# Patient Record
Sex: FEMALE | Race: BLACK OR AFRICAN AMERICAN | NOT HISPANIC OR LATINO | Employment: OTHER | URBAN - NONMETROPOLITAN AREA
[De-identification: names, ages, dates, MRNs, and addresses within clinical notes are randomized per-mention and may not be internally consistent; named-entity substitution may affect disease eponyms.]

---

## 2017-12-05 DIAGNOSIS — I51.7 LEFT ATRIAL ENLARGEMENT: Primary | ICD-10-CM

## 2017-12-05 PROCEDURE — 93000 ELECTROCARDIOGRAM COMPLETE: CPT | Performed by: INTERNAL MEDICINE

## 2017-12-06 ENCOUNTER — APPOINTMENT (OUTPATIENT)
Dept: LAB | Facility: HOSPITAL | Age: 67
End: 2017-12-06

## 2017-12-06 ENCOUNTER — OFFICE VISIT (OUTPATIENT)
Dept: CARDIOLOGY | Facility: CLINIC | Age: 67
End: 2017-12-06

## 2017-12-06 VITALS
SYSTOLIC BLOOD PRESSURE: 144 MMHG | OXYGEN SATURATION: 98 % | DIASTOLIC BLOOD PRESSURE: 90 MMHG | BODY MASS INDEX: 38.87 KG/M2 | HEART RATE: 70 BPM | HEIGHT: 64 IN | WEIGHT: 227.7 LBS

## 2017-12-06 DIAGNOSIS — R60.1 GENERALIZED EDEMA: ICD-10-CM

## 2017-12-06 DIAGNOSIS — R93.1 ABNORMAL ECHOCARDIOGRAM: ICD-10-CM

## 2017-12-06 DIAGNOSIS — I36.1 NON-RHEUMATIC TRICUSPID VALVE INSUFFICIENCY: ICD-10-CM

## 2017-12-06 DIAGNOSIS — R07.2 PRECORDIAL PAIN: Primary | ICD-10-CM

## 2017-12-06 DIAGNOSIS — I35.1 NONRHEUMATIC AORTIC VALVE INSUFFICIENCY: ICD-10-CM

## 2017-12-06 DIAGNOSIS — I34.0 NON-RHEUMATIC MITRAL REGURGITATION: ICD-10-CM

## 2017-12-06 DIAGNOSIS — E78.2 MIXED HYPERLIPIDEMIA: ICD-10-CM

## 2017-12-06 DIAGNOSIS — I10 ESSENTIAL HYPERTENSION: ICD-10-CM

## 2017-12-06 DIAGNOSIS — I73.9 CLAUDICATION (HCC): ICD-10-CM

## 2017-12-06 LAB
ALBUMIN UR-MCNC: <0.6 MG/L
ARTICHOKE IGE QN: 90 MG/DL (ref 1–129)
CHOLEST SERPL-MCNC: 173 MG/DL (ref 0–199)
HDLC SERPL-MCNC: 58 MG/DL (ref 60–200)
LDLC/HDLC SERPL: 1.64 {RATIO} (ref 0–3.22)
NT-PROBNP SERPL-MCNC: 69.7 PG/ML (ref 0–900)
TRIGL SERPL-MCNC: 100 MG/DL (ref 20–199)

## 2017-12-06 PROCEDURE — 99201 PR OFFICE OUTPATIENT NEW 10 MINUTES: CPT | Performed by: INTERNAL MEDICINE

## 2017-12-06 PROCEDURE — 36415 COLL VENOUS BLD VENIPUNCTURE: CPT | Performed by: INTERNAL MEDICINE

## 2017-12-06 PROCEDURE — 80061 LIPID PANEL: CPT | Performed by: INTERNAL MEDICINE

## 2017-12-06 PROCEDURE — 93000 ELECTROCARDIOGRAM COMPLETE: CPT | Performed by: INTERNAL MEDICINE

## 2017-12-06 PROCEDURE — 82043 UR ALBUMIN QUANTITATIVE: CPT | Performed by: INTERNAL MEDICINE

## 2017-12-06 PROCEDURE — 83880 ASSAY OF NATRIURETIC PEPTIDE: CPT | Performed by: INTERNAL MEDICINE

## 2017-12-06 RX ORDER — CARVEDILOL 6.25 MG/1
6.25 TABLET ORAL 2 TIMES DAILY
COMMUNITY
Start: 2017-11-29 | End: 2022-09-29 | Stop reason: HOSPADM

## 2017-12-06 RX ORDER — ATORVASTATIN CALCIUM 20 MG/1
40 TABLET, FILM COATED ORAL DAILY
COMMUNITY
Start: 2017-11-28 | End: 2021-05-28 | Stop reason: SDUPTHER

## 2017-12-06 RX ORDER — AMLODIPINE BESYLATE 10 MG/1
10 TABLET ORAL DAILY
Qty: 90 TABLET | Refills: 3 | Status: SHIPPED | OUTPATIENT
Start: 2017-12-06 | End: 2018-01-09 | Stop reason: SINTOL

## 2017-12-06 RX ORDER — ASPIRIN 81 MG/1
81 TABLET ORAL DAILY
COMMUNITY
End: 2022-11-15

## 2017-12-06 RX ORDER — AMLODIPINE BESYLATE 5 MG/1
TABLET ORAL DAILY
COMMUNITY
Start: 2017-11-16 | End: 2017-12-06

## 2017-12-06 RX ORDER — LOSARTAN POTASSIUM 100 MG/1
TABLET ORAL DAILY
COMMUNITY
Start: 2017-11-10 | End: 2020-03-06 | Stop reason: HOSPADM

## 2017-12-06 RX ORDER — SENNOSIDES 8.6 MG
650 CAPSULE ORAL EVERY 8 HOURS PRN
COMMUNITY

## 2017-12-06 NOTE — PATIENT INSTRUCTIONS
Aortic Insufficiency  Aortic insufficiency is a condition where the aortic valve does not close all the way. The aortic valve is a gate-like structure that is located between the lower left chamber of the heart (left ventricle) and the blood vessel that leads away from the heart (aorta). The aortic valve opens when the left ventricle squeezes to pump blood into the aorta, and it closes when the left ventricle relaxes.   In aortic insufficiency, blood in the aorta leaks through the aortic valve after it has closed. As a result, the heart works harder to pump the same amount of blood through the valve as it would if the valve closed tightly. When left untreated, aortic insufficiency causes enlargement and weakening of the left ventricle. When this happens, the heart fails to pump blood as well as it should, and heart failure, abnormal heart rhythms (arrhythmias), and other dangerous conditions may develop.  CAUSES   Anything that weakens the aortic valve can cause aortic insufficiency. Examples include:   · Severe high blood pressure (hypertension).  · Inflammation of the lining of the heart (endocarditis).  · A ballooning of a weak spot in the aorta wall (aortic aneurysm).  · A tear or separation of the inner walls of the aorta (aortic dissection).  · Trauma that damages the aortic valve.  · Certain drugs.  · Complications during or after a heart surgery (rare).  · Disease of the protein in the body called collagen (collagen vascular disease).  · A birth defect.  · Rheumatic fever.  SIGNS AND SYMPTOMS  Unless a major injury or endocarditis caused aortic insufficiency, symptoms usually develop very slowly over time. Symptoms may include:  · Weakness and tiredness (fatigue).  · Shortness of breath.    · Difficulty breathing while lying flat (orthopnea). You may be sleeping on two or more pillows to breathe better.  · Chest discomfort (angina).    · Head bobbing.  · A fluttering feeling in your chest  (palpitations).  · An irregular or faster-than-normal heartbeat.  DIAGNOSIS   Aortic insufficiency is usually diagnosed with a physical exam and with a type of imaging test called echocardiography. Echocardiography uses sound waves to produce images of the heart. Other tests may also be done to confirm the diagnosis. They may include:  · Chest X-ray.  · MRI.  · Electrocardiography. This is a test that records the electrical impulses of the heart.  · Angiography. This is a test that produces images of arteries in your body. You may need aortic angiography or CT angiography. In aortic angiography, a dye flows to your heart through a soft, flexible tube (catheter) while X-rays are taken. CT angiography uses a CT scanner and MRI in addition to the catheter, dye, and X-rays.  TREATMENT   Treatment depends on how severe the aortic insufficiency is, the problems it is causing, and your symptoms.   · Observation. If the aortic insufficiency is mild, no treatment may be needed. However, you will need to have the condition checked regularly to make sure it is not getting worse or causing serious problems.  · Surgery. If the aortic insufficiency becomes severe, you may need surgery to repair or replace the valve. Surgery is usually recommended if the left ventricle enlarges beyond a certain point. If aortic insufficiency occurs suddenly, surgery may be needed immediately.  · Medicines. Some medicines may help the heart work more efficiently.   HOME CARE INSTRUCTIONS   · Keep all follow-up visits as directed by your health care provider. You may need to have tests done regularly to monitor your condition and how well your heart is pumping blood.  · Notify your health care provider of your aortic insufficiency if you will be having ear, nose, or throat surgery; surgery to your lungs; or dental surgery if:    You have had endocarditis.    You have an artificial heart valve.    You have had surgery to repair a heart valve using a  synthetic or biologic material.   · Take medicines only as directed by your health care provider.    SEEK MEDICAL CARE IF:  · Your chest symptoms seem to be getting worse.  · Your breathing problems seem to be getting worse.  · You feel dizzy or close to fainting.  · You have swelling of the feet, ankles, legs, or abdomen.  · You urinate more than usual during the night (nocturia).  · You have an unexplained fever lasting 2 or more days.  · You have new symptoms that cause concern.  SEEK IMMEDIATE MEDICAL CARE IF:   · You have severe chest pain.  · You have severe shortness of breath.  · You feel rapid or irregular heartbeats.  · You feel light-headed.    · You have unexplained, sudden weight gain.  MAKE SURE YOU:   · Understand these instructions.  · Will watch your condition.  · Will get help right away if you are not doing well or get worse.     This information is not intended to replace advice given to you by your health care provider. Make sure you discuss any questions you have with your health care provider.     Document Released: 06/23/2004 Document Revised: 04/10/2017 Document Reviewed: 02/25/2014  [employer] Interactive Patient Education ©2017 [employer]  Mitral Valve Regurgitation  Mitral valve regurgitation, also called mitral regurgitation, is when blood leaks from the mitral valve. The mitral valve is located between the upper left chamber of the heart (left atrium) and the lower left chamber of the heart (left ventricle). Normally, this valve opens when the atrium pumps blood into the ventricle, and it closes when the ventricle pumps blood out to the body.  Mitral valve regurgitation happens when the mitral valve does not close properly. As a result, blood in the ventricle leaks back into the atrium. Mitral valve regurgitation causes the heart to work harder to pump blood. Over time, this can lead to heart failure.  CAUSES   Causes of mitral valve regurgitation include:  · Damage to the mitral  valve, such as from a birth defect or heart attack.  · Infection.  · Heart disease.  · Mitral valve prolapse.  RISK FACTORS  You are more likely to develop mitral valve regurgitation if you have:  · Mitral valve prolapse.  · A heart valve infection.  · High blood pressure.  · Coronary or rheumatic heart disease.  · Swelling in your left ventricle.  · Marfan syndrome.  · Untreated syphilis.  You are also more likely to develop the condition if you have taken certain diet pills in the past.  SIGNS AND SYMPTOMS  If the condition is mild, you may not have symptoms. If you do have symptoms, they can include:  · Shortness of breath with physical activity, like climbing stairs.  · Fast or irregular heartbeat.  · Cough.  · Excessive urination, especially at night.  · Heavy breathing.  · Extreme tiredness.  · Lightheadedness.  DIAGNOSIS  To diagnose mitral valve regurgitation, your health care provider will listen to your heart for an abnormal heart sound (murmur). Your health care provider may also order tests, such as:  · An echocardiogram.  · A CT scan.  · An MRI.  TREATMENT   Treatment may include:  · Medicines. These may be given to treat symptoms and prevent complications.  · Surgery to repair or replace the mitral valve. This may be done if:    Your heart becomes larger than normal.    Your heart cannot pump blood well.    Your symptoms get worse.  HOME CARE INSTRUCTIONS   · Take medicines only as directed by your health care provider.  · Eat a heart-healthy diet. A dietitian can help you to plan meals.  · Do not use any tobacco products, including cigarettes, chewing tobacco, and electronic cigarettes. If you need help quitting, ask your health care provider.  · Work closely with your health care provider to manage lasting conditions, such as diabetes and high blood pressure.  · Maintain a healthy weight and stay physically active. Ask your health care provider to recommend some activities that are safe for you to  do.  · Limit alcohol intake to no more than 1 drink per day for nonpregnant women and 2 drinks per day for men. One drink equals 12 ounces of beer, 5 ounces of wine, or 1½ ounces of hard liquor.  · Try to get at least 7 hours of sleep each night.  · Find ways to manage stress.  SEEK IMMEDIATE MEDICAL CARE IF:  · You have shortness of breath.  · You develop chest pain.  · You sweat.  · You feel nauseous.  · You have swelling in your hands, feet, ankles, or abdomen that is getting worse.  · You have a feeling of fullness in your abdomen.  · You lose your appetite.  · You feel dizzy or unsteady.  · You have blurred vision or a headache.  · Any of your symptoms begin to get worse.  · You develop muscle aches, chills, or fever.  · You feel ill.     This information is not intended to replace advice given to you by your health care provider. Make sure you discuss any questions you have with your health care provider.     Document Released: 03/07/2006 Document Revised: 04/10/2017 Document Reviewed: 05/20/2015  [employer] Interactive Patient Education ©2017 [employer]

## 2017-12-06 NOTE — PROGRESS NOTES
Cardiovascular Medicine      Maximiliano Saravia M.D., Ph.D., [facility]         Thank you for asking me to see Bacilio Shaffer for LAE.    History of Present Illness      1.  Abnormal echo  2.  Hypertension  3.  Hyperlipidemia  4.  Type 2 diabetes  5. Mild MR/TR/AI    Abnormal Echo/MR/TR/AI  The patient's referred from her primary care provider for an abnormal echocardiogram.  I do not have the full report, but it was interpreted as having a preserved LVEF.  The left atrium was noted to be dilated.  There is a comment on mild degrees of MR, TR and AI.    CPS/Claudication  The patient has had some shoulder discomfort. This worsens at night. She has had no overt dyspnea or angina. No known ASCAD, MI or CHF. She has had mild LE edema and pain in her LE. This does worsen with walking. She had a chemical stress test earlier this year. I do not have these results.     Cardiovascular ROS: no chest pain or dyspnea on exertion.  All other systems were reviewed and were negative.    family history includes Diabetes in her other; Heart disease in her other; Hypertension in her other; Stroke in her other.     reports that she has never smoked. She does not have any smokeless tobacco history on file. She reports that she does not drink alcohol.    Allergies   Allergen Reactions   • Amlodipine    • Lortab [Hydrocodone-Acetaminophen]          Current Outpatient Prescriptions:   •  acetaminophen (TYLENOL 8 HOUR) 650 MG 8 hr tablet, Take 650 mg by mouth Every 8 (Eight) Hours As Needed for Mild Pain ., Disp: , Rfl:   •  amLODIPine (NORVASC) 5 MG tablet, Daily., Disp: , Rfl:   •  aspirin 81 MG EC tablet, Take 81 mg by mouth Daily., Disp: , Rfl:   •  atorvastatin (LIPITOR) 20 MG tablet, Daily., Disp: , Rfl:   •  carvedilol (COREG) 6.25 MG tablet, 2 (Two) Times a Day., Disp: , Rfl:   •  losartan (COZAAR) 100 MG tablet, Daily., Disp: , Rfl:   •  metFORMIN (GLUCOPHAGE) 500 MG tablet, Take 500 mg by mouth 2 (Two) Times a Day With Meals.,  Disp: , Rfl:     Physical Exam:  Vitals:    12/06/17 1030   BP: 144/90   Pulse:    SpO2:      Body mass index is 39.08 kg/(m^2).    GEN: alert, appears stated age and cooperative  Body Habitus: overweight  Neuro: CN II-XII grossly intact.   HEENT: Head: Normocephalic, no lesions, without obvious abnormality.  Neck / Thyroid: Supple, no masses, nodes, nodules or enlargement. No arcus senilis, xanthelasma or xanthomas. PERRL. Normal external ears. No drainage. No thyromegaly. Neck supple. No LAD. Trachea midline. Nose, normal.  JVP: 6 cm of water at 45 degrees HJR: absent      Carotid:  Upstroke: easily palpated bilaterally Volume: Normal.    Carotid Bruit:  None  Subclavian Bruit: Not present.    Lymph: No overt LAD.   Back: Normal.  Chest:  Normal Excursion: Good    I:E: Good  Pulmonary:clear to auscultation, no wheezes, rales or rhonchi, symmetric air entry. Equal chest excursion. Chest physical exam is normal. No tenderness.        Precordium:  No palpable heaves or thrusts. P2 is not palpable.   [place]:  normal size and placement Palpable S4: Not present.   Heart rate: normal  Heart Rhythm: regular     Heart Sounds: S1: normal intensity  S2: normal intensity  S3: absent   S4: absent  Opening Snap: absent  A2-OS:  N/A  Pericardial rub: absent    Ejection click: None      Murmurs: Systolic: none  Diastolic: none  Abdomen: deferred  Extremity: no edema, cyanosis  Pulses: Right radial artery has 2+ (normal) pulse and Left radial artery has 2+ (normal) pulse    DATA REVIEWED:             EKG: normal EKG, normal sinus rhythm.    Assessment/Plan     1. Mild AI/TR/MR. NYHA stage B.  - I discussed with her the typical evaluation interval for this.  - I recommended an annual appointment as well as a repeat echocardiogram in 2020.    2. Claudication/LE edema.  -ABIs, labs, venous Duplex    3. LAE. This is likely from long-standing HTN. BP is not controlled today.  -Increase Norvasc to 10mg  -f/u PCP  -Labs    4. Shoulder  pain. This appears to be MSK. She tells me she had a MPS in [facility]. I do not have these records.   -Obtain stress test copy    5. Cardiac Risk Assessment and need for statin therapy: Moderate Risk.   -Statin:  Yes.  -Recommended ASA    6. Tobacco staus:    Follow-up: One month APRN for results; one year with me.              This document has been electronically signed by Maximiliano Saravia MD PhD on December 6, 2017 10:48 AM

## 2017-12-20 LAB
BH CV LOWER ARTERIAL LEFT ABI RATIO: 1.05
BH CV LOWER ARTERIAL LEFT DORSALIS PEDIS SYS MAX: 181 MMHG
BH CV LOWER ARTERIAL LEFT POST TIBIAL SYS MAX: 168 MMHG
BH CV LOWER ARTERIAL RIGHT ABI RATIO: 1.04
BH CV LOWER ARTERIAL RIGHT DORSALIS PEDIS SYS MAX: 174 MMHG
BH CV LOWER ARTERIAL RIGHT POST TIBIAL SYS MAX: 179 MMHG
BH CV LOWER VASCULAR LEFT COMMON FEMORAL AUGMENT: NORMAL
BH CV LOWER VASCULAR LEFT COMMON FEMORAL COMPETENT: NORMAL
BH CV LOWER VASCULAR LEFT COMMON FEMORAL COMPRESS: NORMAL
BH CV LOWER VASCULAR LEFT COMMON FEMORAL PHASIC: NORMAL
BH CV LOWER VASCULAR LEFT COMMON FEMORAL SPONT: NORMAL
BH CV LOWER VASCULAR LEFT DISTAL FEMORAL AUGMENT: NORMAL
BH CV LOWER VASCULAR LEFT DISTAL FEMORAL COMPETENT: NORMAL
BH CV LOWER VASCULAR LEFT DISTAL FEMORAL COMPRESS: NORMAL
BH CV LOWER VASCULAR LEFT DISTAL FEMORAL PHASIC: NORMAL
BH CV LOWER VASCULAR LEFT DISTAL FEMORAL SPONT: NORMAL
BH CV LOWER VASCULAR LEFT GREATER SAPH AK AUGMENT: NORMAL
BH CV LOWER VASCULAR LEFT GREATER SAPH AK COMPETENT: NORMAL
BH CV LOWER VASCULAR LEFT GREATER SAPH AK COMPRESS: NORMAL
BH CV LOWER VASCULAR LEFT GREATER SAPH AK PHASIC: NORMAL
BH CV LOWER VASCULAR LEFT GREATER SAPH AK SPONT: NORMAL
BH CV LOWER VASCULAR LEFT LESSER SAPH COMPRESS: NORMAL
BH CV LOWER VASCULAR LEFT MID FEMORAL AUGMENT: NORMAL
BH CV LOWER VASCULAR LEFT MID FEMORAL COMPETENT: NORMAL
BH CV LOWER VASCULAR LEFT MID FEMORAL COMPRESS: NORMAL
BH CV LOWER VASCULAR LEFT MID FEMORAL PHASIC: NORMAL
BH CV LOWER VASCULAR LEFT MID FEMORAL SPONT: NORMAL
BH CV LOWER VASCULAR LEFT PERONEAL AUGMENT: NORMAL
BH CV LOWER VASCULAR LEFT PERONEAL COMPETENT: NORMAL
BH CV LOWER VASCULAR LEFT PERONEAL COMPRESS: NORMAL
BH CV LOWER VASCULAR LEFT PERONEAL PHASIC: NORMAL
BH CV LOWER VASCULAR LEFT PERONEAL SPONT: NORMAL
BH CV LOWER VASCULAR LEFT POPLITEAL AUGMENT: NORMAL
BH CV LOWER VASCULAR LEFT POPLITEAL COMPETENT: NORMAL
BH CV LOWER VASCULAR LEFT POPLITEAL COMPRESS: NORMAL
BH CV LOWER VASCULAR LEFT POPLITEAL PHASIC: NORMAL
BH CV LOWER VASCULAR LEFT POPLITEAL SPONT: NORMAL
BH CV LOWER VASCULAR LEFT POSTERIOR TIBIAL AUGMENT: NORMAL
BH CV LOWER VASCULAR LEFT POSTERIOR TIBIAL COMPETENT: NORMAL
BH CV LOWER VASCULAR LEFT POSTERIOR TIBIAL COMPRESS: NORMAL
BH CV LOWER VASCULAR LEFT POSTERIOR TIBIAL PHASIC: NORMAL
BH CV LOWER VASCULAR LEFT POSTERIOR TIBIAL SPONT: NORMAL
BH CV LOWER VASCULAR LEFT PROFUNDA FEMORAL AUGMENT: NORMAL
BH CV LOWER VASCULAR LEFT PROFUNDA FEMORAL COMPETENT: NORMAL
BH CV LOWER VASCULAR LEFT PROFUNDA FEMORAL COMPRESS: NORMAL
BH CV LOWER VASCULAR LEFT PROFUNDA FEMORAL PHASIC: NORMAL
BH CV LOWER VASCULAR LEFT PROFUNDA FEMORAL SPONT: NORMAL
BH CV LOWER VASCULAR LEFT PROXIMAL FEMORAL AUGMENT: NORMAL
BH CV LOWER VASCULAR LEFT PROXIMAL FEMORAL COMPETENT: NORMAL
BH CV LOWER VASCULAR LEFT PROXIMAL FEMORAL COMPRESS: NORMAL
BH CV LOWER VASCULAR LEFT PROXIMAL FEMORAL PHASIC: NORMAL
BH CV LOWER VASCULAR LEFT PROXIMAL FEMORAL SPONT: NORMAL
BH CV LOWER VASCULAR LEFT SAPHENOFEMORAL JUNCTION AUGMENT: NORMAL
BH CV LOWER VASCULAR LEFT SAPHENOFEMORAL JUNCTION COMPETENT: NORMAL
BH CV LOWER VASCULAR LEFT SAPHENOFEMORAL JUNCTION COMPRESS: NORMAL
BH CV LOWER VASCULAR LEFT SAPHENOFEMORAL JUNCTION PHASIC: NORMAL
BH CV LOWER VASCULAR LEFT SAPHENOFEMORAL JUNCTION SPONT: NORMAL
BH CV LOWER VASCULAR RIGHT COMMON FEMORAL AUGMENT: NORMAL
BH CV LOWER VASCULAR RIGHT COMMON FEMORAL COMPETENT: NORMAL
BH CV LOWER VASCULAR RIGHT COMMON FEMORAL COMPRESS: NORMAL
BH CV LOWER VASCULAR RIGHT COMMON FEMORAL PHASIC: NORMAL
BH CV LOWER VASCULAR RIGHT COMMON FEMORAL SPONT: NORMAL
BH CV LOWER VASCULAR RIGHT DISTAL FEMORAL AUGMENT: NORMAL
BH CV LOWER VASCULAR RIGHT DISTAL FEMORAL COMPETENT: NORMAL
BH CV LOWER VASCULAR RIGHT DISTAL FEMORAL COMPRESS: NORMAL
BH CV LOWER VASCULAR RIGHT DISTAL FEMORAL PHASIC: NORMAL
BH CV LOWER VASCULAR RIGHT DISTAL FEMORAL SPONT: NORMAL
BH CV LOWER VASCULAR RIGHT GREATER SAPH AK AUGMENT: NORMAL
BH CV LOWER VASCULAR RIGHT GREATER SAPH AK COMPETENT: NORMAL
BH CV LOWER VASCULAR RIGHT GREATER SAPH AK COMPRESS: NORMAL
BH CV LOWER VASCULAR RIGHT GREATER SAPH AK PHASIC: NORMAL
BH CV LOWER VASCULAR RIGHT GREATER SAPH AK SPONT: NORMAL
BH CV LOWER VASCULAR RIGHT GREATER SAPH BK AUGMENT: NORMAL
BH CV LOWER VASCULAR RIGHT GREATER SAPH BK COMPETENT: NORMAL
BH CV LOWER VASCULAR RIGHT GREATER SAPH BK COMPRESS: NORMAL
BH CV LOWER VASCULAR RIGHT GREATER SAPH BK PHASIC: NORMAL
BH CV LOWER VASCULAR RIGHT GREATER SAPH BK SPONT: NORMAL
BH CV LOWER VASCULAR RIGHT LESSER SAPH COMPRESS: NORMAL
BH CV LOWER VASCULAR RIGHT MID FEMORAL AUGMENT: NORMAL
BH CV LOWER VASCULAR RIGHT MID FEMORAL COMPETENT: NORMAL
BH CV LOWER VASCULAR RIGHT MID FEMORAL COMPRESS: NORMAL
BH CV LOWER VASCULAR RIGHT MID FEMORAL PHASIC: NORMAL
BH CV LOWER VASCULAR RIGHT MID FEMORAL SPONT: NORMAL
BH CV LOWER VASCULAR RIGHT PERONEAL AUGMENT: NORMAL
BH CV LOWER VASCULAR RIGHT PERONEAL COMPETENT: NORMAL
BH CV LOWER VASCULAR RIGHT PERONEAL COMPRESS: NORMAL
BH CV LOWER VASCULAR RIGHT PERONEAL PHASIC: NORMAL
BH CV LOWER VASCULAR RIGHT PERONEAL SPONT: NORMAL
BH CV LOWER VASCULAR RIGHT POPLITEAL AUGMENT: NORMAL
BH CV LOWER VASCULAR RIGHT POPLITEAL COMPETENT: NORMAL
BH CV LOWER VASCULAR RIGHT POPLITEAL COMPRESS: NORMAL
BH CV LOWER VASCULAR RIGHT POPLITEAL PHASIC: NORMAL
BH CV LOWER VASCULAR RIGHT POPLITEAL SPONT: NORMAL
BH CV LOWER VASCULAR RIGHT POSTERIOR TIBIAL AUGMENT: NORMAL
BH CV LOWER VASCULAR RIGHT POSTERIOR TIBIAL COMPETENT: NORMAL
BH CV LOWER VASCULAR RIGHT POSTERIOR TIBIAL COMPRESS: NORMAL
BH CV LOWER VASCULAR RIGHT POSTERIOR TIBIAL PHASIC: NORMAL
BH CV LOWER VASCULAR RIGHT POSTERIOR TIBIAL SPONT: NORMAL
BH CV LOWER VASCULAR RIGHT PROFUNDA FEMORAL COMPRESS: NORMAL
BH CV LOWER VASCULAR RIGHT PROFUNDA FEMORAL PHASIC: NORMAL
BH CV LOWER VASCULAR RIGHT PROFUNDA FEMORAL SPONT: NORMAL
BH CV LOWER VASCULAR RIGHT PROXIMAL FEMORAL AUGMENT: NORMAL
BH CV LOWER VASCULAR RIGHT PROXIMAL FEMORAL COMPETENT: NORMAL
BH CV LOWER VASCULAR RIGHT PROXIMAL FEMORAL COMPRESS: NORMAL
BH CV LOWER VASCULAR RIGHT PROXIMAL FEMORAL PHASIC: NORMAL
BH CV LOWER VASCULAR RIGHT PROXIMAL FEMORAL SPONT: NORMAL
BH CV LOWER VASCULAR RIGHT SAPHENOFEMORAL JUNCTION AUGMENT: NORMAL
BH CV LOWER VASCULAR RIGHT SAPHENOFEMORAL JUNCTION COMPETENT: NORMAL
BH CV LOWER VASCULAR RIGHT SAPHENOFEMORAL JUNCTION COMPRESS: NORMAL
BH CV LOWER VASCULAR RIGHT SAPHENOFEMORAL JUNCTION PHASIC: NORMAL
BH CV LOWER VASCULAR RIGHT SAPHENOFEMORAL JUNCTION SPONT: NORMAL
UPPER ARTERIAL LEFT ARM BRACHIAL SYS MAX: 172 MMHG
UPPER ARTERIAL RIGHT ARM BRACHIAL SYS MAX: 162 MMHG

## 2018-01-09 ENCOUNTER — OFFICE VISIT (OUTPATIENT)
Dept: CARDIOLOGY | Facility: CLINIC | Age: 68
End: 2018-01-09

## 2018-01-09 VITALS
SYSTOLIC BLOOD PRESSURE: 154 MMHG | HEART RATE: 76 BPM | WEIGHT: 226.25 LBS | OXYGEN SATURATION: 97 % | BODY MASS INDEX: 38.63 KG/M2 | HEIGHT: 64 IN | DIASTOLIC BLOOD PRESSURE: 90 MMHG

## 2018-01-09 DIAGNOSIS — R07.2 PRECORDIAL PAIN: Primary | ICD-10-CM

## 2018-01-09 DIAGNOSIS — R60.0 BILATERAL LEG EDEMA: ICD-10-CM

## 2018-01-09 DIAGNOSIS — I38 VALVULAR DISEASE: ICD-10-CM

## 2018-01-09 DIAGNOSIS — I10 ESSENTIAL HYPERTENSION: ICD-10-CM

## 2018-01-09 DIAGNOSIS — E78.2 MIXED HYPERLIPIDEMIA: ICD-10-CM

## 2018-01-09 DIAGNOSIS — I73.9 CLAUDICATION (HCC): ICD-10-CM

## 2018-01-09 PROCEDURE — 99214 OFFICE O/P EST MOD 30 MIN: CPT | Performed by: NURSE PRACTITIONER

## 2018-01-09 RX ORDER — GABAPENTIN 600 MG/1
300 TABLET ORAL DAILY PRN
COMMUNITY
End: 2021-12-13

## 2018-01-09 NOTE — PROGRESS NOTES
Subjective:     precordial pain (chief complaint )      History of Present Illness  Abnormal Echo/MR/TR/AI  The patient's referred from her primary care provider for an abnormal echocardiogram. Do not have the full report, but it was interpreted as having a preserved LVEF. The left atrium was noted to be dilated.  There is a comment on mild degrees of MR, TR and AI.    CPS/Claudication  The patient has had some shoulder discomfort. This worsens at night. She has had no overt dyspnea or angina. No known ASCAD, MI or CHF. She has had mild LE edema and pain in her LE. This does worsen with walking. She had a chemical stress test earlier this year. Fax sent to [facility] to retrieve these medical records.     Ms. Shaffer is a 1 month follow up for results of ABIs and venous duplex. Her leg swelling has worsened over the past month since Norvasc was increased from 5mg to 10mg. 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 She does not have many breaks. Her legs are very swollen at the end of the day. In the AM when she awakens, the swelling is gone.   She does have elevated BP followed by her PCP. Recommendations given today to patient, but will defer any further changes to PCP.     Review of Systems   Constitution: Negative for chills, decreased appetite, fever and weakness.   HENT: Negative.    Eyes: Negative.    Cardiovascular: Positive for leg swelling. Negative for chest pain, claudication, dyspnea on exertion, irregular heartbeat and palpitations.   Respiratory: Negative for cough, shortness of breath and wheezing.    Endocrine: Negative.    Skin: Negative for dry skin, flushing and rash.   Musculoskeletal: Negative for falls and myalgias.   Gastrointestinal: Negative for abdominal pain, change in bowel habit and melena.   Genitourinary: Negative for frequency and hematuria.   Neurological: Negative for dizziness, light-headedness and loss of balance.   Psychiatric/Behavioral: Negative for  "altered mental status and memory loss. The patient is not nervous/anxious.        Past Medical History:   Diagnosis Date   • Carpal tunnel syndrome    • Diabetes mellitus    • Diabetic neuropathy    • Female stress incontinence    • History of colonoscopy 2007   • History of mammogram 05/2011   • Hyperlipidemia    • Hypertension    • Murmur, cardiac    • Sciatica of left side    • Vitamin D deficiency      Past Surgical History:   Procedure Laterality Date   • CARPAL TUNNEL RELEASE  08/22/2007   • HYSTERECTOMY     • INJECTION OF MEDICATION  12/08/2015    kenalog 6   • INJECTION OF MEDICATION  02/12/2015    toradol 3       Current Outpatient Prescriptions   Medication Sig Dispense Refill   • acetaminophen (TYLENOL 8 HOUR) 650 MG 8 hr tablet Take 650 mg by mouth Every 8 (Eight) Hours As Needed for Mild Pain .     • amLODIPine (NORVASC) 10 MG tablet Take 1 tablet by mouth Daily. 90 tablet 3   • aspirin 81 MG EC tablet Take 81 mg by mouth Daily.     • atorvastatin (LIPITOR) 20 MG tablet Daily.     • carvedilol (COREG) 6.25 MG tablet 2 (Two) Times a Day.     • gabapentin (NEURONTIN) 600 MG tablet Take 600 mg by mouth Daily.     • losartan (COZAAR) 100 MG tablet Daily.     • metFORMIN (GLUCOPHAGE) 500 MG tablet Take 500 mg by mouth 2 (Two) Times a Day With Meals.       No current facility-administered medications for this visit.         Objective:     Vitals:    01/09/18 0934   BP: 154/90   BP Location: Left arm   Patient Position: Sitting   Cuff Size: Adult   Pulse: 76   SpO2: 97%   Weight: 103 kg (226 lb 4 oz)   Height: 162.6 cm (64\")        Physical Exam   Constitutional: She is oriented to person, place, and time. She appears well-developed and well-nourished. No distress.   HENT:   Head: Normocephalic.   Neck: No JVD present.   Cardiovascular: Normal rate, regular rhythm, S1 normal, S2 normal, normal heart sounds and intact distal pulses.    No murmur heard.  Pulmonary/Chest: Effort normal and breath sounds normal. " No respiratory distress. She has no wheezes. She has no rales.   Abdominal: Soft. Bowel sounds are normal.   Musculoskeletal: Normal range of motion. She exhibits no edema.   Neurological: She is alert and oriented to person, place, and time.   Skin: Skin is warm and dry. No erythema.   Psychiatric: She has a normal mood and affect. Her behavior is normal. Judgment and thought content normal.       Cardiographics  Echocardiogram: 2017      ECG:  NSR    Stress Testin/3/2017      Venous Duplex 2017  Interpretation Summary      · Normal bilateral lower extremity venous duplex scan.     WANDY's 2017  Interpretation Summary      · Right Conclusion: The right WANDY is normal.  · Left Conclusion: The left WANDY is normal.       Imaging  Chest x-ray:    Lab Review    Ref. Range 2017 11:15   Total Cholesterol Latest Ref Range: 0 - 199 mg/dL 173   HDL Cholesterol Latest Ref Range: 60 - 200 mg/dL 58 (L)   LDL Cholesterol  Latest Ref Range: 1 - 129 mg/dL 90   Triglycerides Latest Ref Range: 20 - 199 mg/dL 100   LDL/HDL Ratio Latest Ref Range: 0.00 - 3.22  1.64   Microalbumin, Urine Latest Units: mg/L <0.6     The following portions of the patient's history were reviewed and updated as appropriate: allergies, current medications, past family history, past medical history, past social history, past surgical history and problem list.     Assessment/Plan:      Diagnosis Plan   1. Precordial pain  No CP complaint.   Normal Stress test reviewed.       2. Mixed hyperlipidemia  Lipitor 20mg.  Followed by PCP       3. Essential hypertension  HTN, essential.   BP noted to be mildly elevated today in office, S1, S2 normal, no gallop, no murmur, chest clear, no JVD, no HSM, no edema.    LVH: absent.  LVEF:Normal.   Diastolic function:Not Available.  End-organ damage: diabetes    Medications:  Stop Amlodipine secondary to worsening edema with increase from 5mg to 10mg daily last month.  Increase Coreg 6.25mg to 12.5mg  BID with instructions to monitor HR. If HR <60 will need to return to original dose.   PCP appointment today: any changes that need to be made can be made by PCP if they do not agree with above.   Possibility of adding hydralazine 25mg BID.       4. Claudication  Negative duplex and normal ABIs       5. Valvular disease  Mild MR and Mild TR  Yearly eval with Cardiology.  Echo surveillience in 2023 or earlier if symptoms present.        6. Bilateral leg edema  Edema absent this AM. Worse in the evenings, not present in AM. Has worsened over the past month since increasing Norvasc to 10mg.   No s/s of CHF present.    Stop Amlodopine 10mg.   Compression stockings RX given.         Follow up with PCP today as scheduled for evaluation regarding HTN. Next scheduled with Dr. Saravia.  Please call for need to be seen sooner with myself or Norma for any complaints.

## 2018-01-09 NOTE — PATIENT INSTRUCTIONS
Increase Coreg to 12.5mg BID and STOP Amlodipine due to leg swelling.    Watch HR with Coreg increase. Do not need it to go below 60 bpm.     Compression stockings recommended. Rx provided.     Signs and symptoms of Atrial fibrillation explained.     Recommend Sleep Study evaluation for obstructive sleep apnea.

## 2019-01-03 DIAGNOSIS — R07.9 CHEST PAIN, UNSPECIFIED TYPE: Primary | ICD-10-CM

## 2019-01-04 ENCOUNTER — OFFICE VISIT (OUTPATIENT)
Dept: CARDIOLOGY | Facility: CLINIC | Age: 69
End: 2019-01-04

## 2019-01-04 VITALS
BODY MASS INDEX: 40.04 KG/M2 | HEIGHT: 64 IN | SYSTOLIC BLOOD PRESSURE: 148 MMHG | HEART RATE: 71 BPM | WEIGHT: 234.5 LBS | OXYGEN SATURATION: 98 % | DIASTOLIC BLOOD PRESSURE: 90 MMHG

## 2019-01-04 DIAGNOSIS — E66.09 CLASS 2 OBESITY DUE TO EXCESS CALORIES WITHOUT SERIOUS COMORBIDITY WITH BODY MASS INDEX (BMI) OF 39.0 TO 39.9 IN ADULT: ICD-10-CM

## 2019-01-04 DIAGNOSIS — I34.0 NON-RHEUMATIC MITRAL REGURGITATION: Primary | ICD-10-CM

## 2019-01-04 DIAGNOSIS — I36.1 NON-RHEUMATIC TRICUSPID VALVE INSUFFICIENCY: ICD-10-CM

## 2019-01-04 DIAGNOSIS — E78.2 MIXED HYPERLIPIDEMIA: ICD-10-CM

## 2019-01-04 DIAGNOSIS — I35.1 NONRHEUMATIC AORTIC VALVE INSUFFICIENCY: ICD-10-CM

## 2019-01-04 PROBLEM — I73.9 CLAUDICATION: Status: RESOLVED | Noted: 2018-01-09 | Resolved: 2019-01-04

## 2019-01-04 PROBLEM — R07.2 PRECORDIAL PAIN: Status: RESOLVED | Noted: 2018-01-09 | Resolved: 2019-01-04

## 2019-01-04 PROBLEM — I38 VALVULAR DISEASE: Status: RESOLVED | Noted: 2018-01-09 | Resolved: 2019-01-04

## 2019-01-04 PROCEDURE — 99214 OFFICE O/P EST MOD 30 MIN: CPT | Performed by: INTERNAL MEDICINE

## 2019-01-04 PROCEDURE — 93000 ELECTROCARDIOGRAM COMPLETE: CPT | Performed by: INTERNAL MEDICINE

## 2019-01-04 RX ORDER — PRENATAL VIT NO.126/IRON/FOLIC 28MG-0.8MG
TABLET ORAL DAILY
COMMUNITY
End: 2021-06-11

## 2019-01-04 RX ORDER — AMLODIPINE BESYLATE 10 MG/1
10 TABLET ORAL AS NEEDED
COMMUNITY
End: 2020-03-06 | Stop reason: HOSPADM

## 2019-01-04 NOTE — PROGRESS NOTES
Cardiovascular Medicine      Maximiliano Saravia M.D., Ph.D., [facility]           History of Present Illness      1. MR  2. TR  3. AI  4.  Dyslipidemia  5. HTN  6. DM2    Bacilio Shaffer is a 68 y.o. female returns to clinic for valvular disease.  She has a history of abnormal echo.  This was in 2017.  She had mild degrees of mitral regurgitation and tricuspid regurgitation.  The patient's PA systolic pressure was normal.  She also reportedly had mild aortic regurgitation.  She remains asymptomatic from a cardiovascular standpoint.  She denies any resting, exertional or nocturnal angina.  No dyspnea.  No palpitations.    Review of Systems   Cardiovascular: Negative.    Respiratory: Negative.        family history includes Diabetes in her other; Heart disease in her other; Hypertension in her other; Stroke in her other.     reports that  has never smoked. she has never used smokeless tobacco. Drug use questions deferred to the physician. She reports that she does not drink alcohol.    Allergies   Allergen Reactions   • Atenolol    • Lortab [Hydrocodone-Acetaminophen]          Current Outpatient Medications:   •  acetaminophen (TYLENOL 8 HOUR) 650 MG 8 hr tablet, Take 650 mg by mouth Every 8 (Eight) Hours As Needed for Mild Pain ., Disp: , Rfl:   •  aspirin 81 MG EC tablet, Take 81 mg by mouth Daily., Disp: , Rfl:   •  atorvastatin (LIPITOR) 20 MG tablet, Daily., Disp: , Rfl:   •  carvedilol (COREG) 6.25 MG tablet, 2 (Two) Times a Day., Disp: , Rfl:   •  gabapentin (NEURONTIN) 600 MG tablet, Take 600 mg by mouth Daily., Disp: , Rfl:   •  losartan (COZAAR) 100 MG tablet, Daily., Disp: , Rfl:   •  metFORMIN (GLUCOPHAGE) 500 MG tablet, Take 500 mg by mouth 2 (Two) Times a Day With Meals., Disp: , Rfl:     Physical Exam:  Vitals:    01/04/19 0803   BP: 148/90   Pulse: 71   SpO2: 98%     Body mass index is 40.25 kg/m².    GEN: alert, appears stated age and cooperative  Body Habitus: overweight  HEENT: Head: Normocephalic,  no lesions, without obvious abnormality.  JVP: 6 cm of water at 45 degrees HJR: absent      Heart rate: normal  Heart Rhythm: regular     Heart Sounds: S1: normal intensity  S2: normal intensity  S3: absent   S4: absent  Opening Snap: absent  A2-OS:  N/A  Pericardial rub: absent    Ejection click: None      Murmurs: Systolic: I/VI GREGORY at RSB  Diastolic: none  Extremity: Moves spontaneously    DATA REVIEWED:       Assessment/Plan      Diagnosis Plan   1. Non-rheumatic mitral regurgitation.  ACC stage B.   · Annual clinical surveillance   · Plans for repeat 2-D TTE prior to next appointment in 2020    2. Non-rheumatic tricuspid valve insufficiency/aortic insufficiency.   · As above    3. Mixed hyperlipidemia    · Dietary changes: Increase soluble fiber  · Reduce saturated fat and cholesterol  · Exercise changes: Advised to engage in aerobic exercise on most days of the week  · Statin: Yes  · ASA: Yes  · Continue follow-up visits with PCP for monitoring of labs   4. Obese General patient education:  -Weight loss hand-out  -Exercise intervention:   Hand out, increase aerobic activity  -PCP Follow-up     Return in about 1 year (around 1/4/2020).

## 2019-01-04 NOTE — PATIENT INSTRUCTIONS
Echocardiogram  An echocardiogram, or echocardiography, uses sound waves (ultrasound) to produce an image of your heart. The echocardiogram is simple, painless, obtained within a short period of time, and offers valuable information to your health care provider. The images from an echocardiogram can provide information such as:  · Evidence of coronary artery disease (CAD).  · Heart size.  · Heart muscle function.  · Heart valve function.  · Aneurysm detection.  · Evidence of a past heart attack.  · Fluid buildup around the heart.  · Heart muscle thickening.  · Assess heart valve function.    Tell a health care provider about:  · Any allergies you have.  · All medicines you are taking, including vitamins, herbs, eye drops, creams, and over-the-counter medicines.  · Any problems you or family members have had with anesthetic medicines.  · Any blood disorders you have.  · Any surgeries you have had.  · Any medical conditions you have.  · Whether you are pregnant or may be pregnant.  What happens before the procedure?  No special preparation is needed. Eat and drink normally.  What happens during the procedure?  · In order to produce an image of your heart, gel will be applied to your chest and a wand-like tool (transducer) will be moved over your chest. The gel will help transmit the sound waves from the transducer. The sound waves will harmlessly bounce off your heart to allow the heart images to be captured in real-time motion. These images will then be recorded.  · You may need an IV to receive a medicine that improves the quality of the pictures.  What happens after the procedure?  You may return to your normal schedule including diet, activities, and medicines, unless your health care provider tells you otherwise.  This information is not intended to replace advice given to you by your health care provider. Make sure you discuss any questions you have with your health care provider.  Document Released: 12/15/2001  Document Revised: 08/05/2017 Document Reviewed: 08/25/2014  [employer] Patient Education © 2017 [employer]  Mitral Valve Regurgitation  Mitral valve regurgitation, also called mitral regurgitation, is a condition in which blood leaks from the mitral valve in the heart. The mitral valve is located between the upper left chamber (left atrium) and the lower left chamber (left ventricle) of the heart. Normally, this valve opens when the atrium pumps blood into the ventricle, and it closes when the ventricle pumps blood out to the body.  Mitral valve regurgitation happens when the mitral valve does not close properly. As a result, blood in the ventricle leaks back into the atrium. Mitral valve regurgitation causes the heart to work harder to pump blood. If the condition is mild, a person may not have symptoms. However, over time, this can lead to heart failure.  What are the causes?  This condition may be caused by:  · A condition in which the mitral valves do not close completely when the heart pumps blood (mitral valve prolapse).  · Infection, such as endocarditis or rheumatic fever.  · Damage to the mitral valve, such as from injury (trauma) to the heart, a problem present at birth (birth defect), or a heart attack.  · Certain medicines.    What increases the risk?  This condition is more likely to develop in people who have:  · Certain forms of heart disease.  · A family history of heart valve disease.  · Certain conditions that are present at birth (congenital).    You are also more likely to develop this condition if you have taken certain diet pills in the past.  What are the signs or symptoms?  Symptoms of this condition include:  · Shortness of breath with physical activity, like climbing stairs.  · Fast or irregular heartbeat.  · Cough.  · Suddenly waking up at night with difficulty breathing or needing to urinate.  · Heavy breathing.  · Extreme tiredness.  · Swelling in the lower legs, ankles, and  feet.    In some cases of mild to moderate mitral regurgitation, there are no symptoms.  How is this diagnosed?  This condition may be diagnosed based on the results of a physical exam. Your health care provider will listen to your heart for an abnormal heart sound (murmur). You may also have other tests, including:  · An echocardiogram. This test creates ultrasound images of the heart that allow your health care provider to see how the heart valves work while your heart is beating.  · Chest X-ray.  · Electrocardiogram (ECG). This is a test that records the electrical impulses of the heart.  · Cardiac catheterization. This test is used to look at the structure and function of the heart. A thin tube (catheter) is passed through the blood vessels and into the heart. Dye is injected into the blood vessels so the cardiac system can be seen on images that are taken.    How is this treated?  This condition may be treated with:  · Medicines. These may be given to treat symptoms and prevent complications.  · Surgery to repair or replace the mitral valve in severe, long-term (chronic) cases.    Follow these instructions at home:  Lifestyle  · Limit alcohol intake to no more than 1 drink a day for nonpregnant women and 2 drinks a day for men. One drink equals 12 oz of beer, 5 oz of wine, or 1½ oz of hard liquor.  · Do not use any products that contain nicotine or tobacco, such as cigarettes and e-cigarettes. If you need help quitting, ask your health care provider.  · Eat a heart-healthy diet that includes plenty of fresh fruits and vegetables, whole grains, low-fat (lean) protein, and low-fat dairy products. Consider working with a diet and nutrition specialist (dietitian) to help you make healthy food choices.  · Limit the amount of salt (sodium) in your diet. Avoid adding salt to foods, and avoid foods that are high in salt, such as:  ? Pickles.  ? Smoked and cured meats.  ? Processed foods.  · Maintain a healthy weight  and stay physically active. Ask your health care provider to recommend activities that are safe for you.  · Try to get 7 or more hours of sleep each night.  · Find ways to manage stress. If you need help with this, ask your health care provider.  General instructions    · Take over-the-counter and prescription medicines only as told by your health care provider.  · Work closely with your health care provider to manage any other health conditions you have, such as diabetes or high blood pressure.  · If you plan to become pregnant, talk with your health care provider first.  · Keep all follow-up visits as told by your health care provider. This is important.  Contact a health care provider if:  · You have a fever.  · You feel more tired than usual when doing physical activity.  · You have a dry cough.  Get help right away if:  · You have shortness of breath.  · You develop chest pain.  · You have swelling in your hands, feet, ankles, or abdomen that is getting worse.  · You have trouble staying awake or you faint.  · You feel dizzy or unsteady.  · You suddenly gain weight.  · You feel confused.  · Any of your symptoms begin to get worse.  These symptoms may represent a serious problem that is an emergency. Do not wait to see if the symptoms will go away. Get medical help right away. Call your local emergency services (911 in the [place] Do not drive yourself to the hospital.  Summary  · Mitral valve regurgitation, also called mitral regurgitation, is a condition in which blood leaks from a valve between two chambers of the heart (mitral valve).  · Depending on how severe your condition is, you may be treated with medicines or surgery.  · Practice heart-healthy habits to manage this condition. These include limiting alcohol, avoiding nicotine and tobacco, and eating a balanced diet that is low in salt (sodium).  This information is not intended to replace advice given to you by your health care provider. Make sure you  discuss any questions you have with your health care provider.  Document Released: 03/07/2006 Document Revised: 09/29/2017 Document Reviewed: 09/29/2017  [employer] Interactive Patient Education © 2018 [employer]  Aortic Insufficiency  Aortic insufficiency is a condition in which the aortic valve does not close all the way. The aortic valve is a gate-like structure between the lower left chamber of the heart (left ventricle) and the main blood vessel that supplies blood to the rest of the body (aorta). The aortic valve opens when the left ventricle squeezes to pump blood into the aorta, and it closes when the left ventricle relaxes.  In aortic insufficiency, blood in the aorta leaks through the aortic valve after it has closed. This causes the heart to work harder than usual. If aortic insufficiency is not treated, it causes enlargement and weakening of the left ventricle. This can result in heart failure, abnormal heart rhythms (arrhythmias), and other dangerous conditions. If this condition develops suddenly, it may need to be treated with emergency surgery.  What are the causes?  This condition may be caused by anything that weakens the aortic valve, such as:  · Severe high blood pressure (hypertension).  · Inflammation of the inner layer of the heart or the heart valves (endocarditis).  · A ballooning of a weak spot in the aorta wall (aortic aneurysm).  · A tear or separation of the inner walls of the aorta (aortic dissection).  · Injury (trauma) that damages the aortic valve.  · Certain medicines.  · Disease of a protein in the body called collagen (collagen vascular disease).  · A problem that is present at birth (birth defect).  · An inflammatory condition that can develop after an untreated strep throat infection (rheumatic fever).  · Complications during or after a heart surgery (rare).    What are the signs or symptoms?  Symptoms of this condition include:  · Fatigue.  · Shortness of breath.  · Difficulty  breathing while lying flat (orthopnea). You may need to sleep on two or more pillows to breathe better.  · Chest discomfort (angina).  · Head bobbing.  · A fluttering feeling in your chest (palpitations).  · An irregular or faster-than-normal heartbeat.    Symptoms usually develop gradually, unless this condition was caused by a major injury or by endocarditis.  How is this diagnosed?  This condition is diagnosed based on the results of:  · A physical exam.  · An imaging test that uses sound waves to produce images of the heart (echocardiogram).    You may also have other tests to confirm the diagnosis, including:  · Chest X-ray.  · MRI.  · A test that records the electrical impulses of the heart (electrocardiogram, ECG).  · CT angiogram (CTA). In this procedure, a large X-ray machine, called a CT scanner, takes detailed pictures of blood vessels after dye has been injected into the vessels.  · Aortic angiogram. In this procedure, X-ray images are taken after dye has been injected into blood vessels. This tests the function of the aorta.    How is this treated?  Treatment depends on your symptoms, how severe the condition is, and what problems the condition is causing. Treatment may include:  · Observation. If your condition is mild, you may not need treatment. However, you will need to have your condition checked regularly to make sure it is not getting worse or causing serious problems.  · Medicines that help the heart work more efficiently.  · Surgery to repair or replace the valve, in severe cases. Surgery is usually recommended if the left ventricle enlarges beyond a certain point. If aortic insufficiency occurs suddenly, surgery may be needed immediately.    Follow these instructions at home:  · Take over-the-counter and prescription medicines only as told by your health care provider.  · Do not use any products that contain nicotine or tobacco, such as cigarettes and e-cigarettes. If you need help quitting,  ask your health care provider.  · If directed by your health care provider, avoid heavy weight lifting and contact sports such as football.  · Keep all follow-up visits as told by your health care provider. This is important. You may need regular tests to monitor your condition and how well your heart is pumping blood.  · Follow instructions from your health care provider about eating or drinking restrictions. Your health care provider may recommend that you:  ? Limit alcohol intake to no more than 1 drink a day for nonpregnant women and 2 drinks a day for men. One drink equals 12 oz of beer, 5 oz of wine, or 1½ oz of hard liquor.  ? Eat foods that are high in fiber, such as fresh fruits and vegetables, whole grains, and beans.  ? Eat less salt (sodium) and salty foods. Check ingredients and nutrition facts on packaged foods and beverages.  Contact a health care provider if:  · Your chest symptoms seem to be getting worse.  · Your breathing problems seem to be getting worse.  · You feel dizzy or close to fainting.  · You have swelling in your feet, ankles, legs, or abdomen.  · You urinate more than usual during the night (nocturia).  · You have an unexplained fever that lasts 2 days or longer.  · You develop new symptoms.  Get help right away if:  · You have severe chest pain.  · You have severe shortness of breath.  · You feel rapid or irregular heartbeats.  · You feel light-headed.  · You have sudden, unexplained weight gain.  Summary  · Aortic insufficiency is a condition in which the aortic valve does not close all the way. This causes the heart to work harder than usual.  · This condition may be treated with observation, medicines, or surgery.  · Take over-the-counter and prescription medicines only as told by your health care provider.  · Eat less salt (sodium) and salty foods. Check ingredients and nutrition facts on packaged foods and beverages.  This information is not intended to replace advice given to  you by your health care provider. Make sure you discuss any questions you have with your health care provider.  Document Released: 06/23/2004 Document Revised: 04/09/2018 Document Reviewed: 11/06/2017  [employer] Interactive Patient Education © 2018 [employer]

## 2020-01-02 LAB
BH CV ECHO MEAS - ACS: 1.7 CM
BH CV ECHO MEAS - AO ISTHMUS: 2.5 CM
BH CV ECHO MEAS - AO MAX PG (FULL): 5.4 MMHG
BH CV ECHO MEAS - AO MAX PG: 10.5 MMHG
BH CV ECHO MEAS - AO MEAN PG (FULL): 4 MMHG
BH CV ECHO MEAS - AO MEAN PG: 6 MMHG
BH CV ECHO MEAS - AO ROOT AREA (BSA CORRECTED): 1.1
BH CV ECHO MEAS - AO ROOT AREA: 4.5 CM^2
BH CV ECHO MEAS - AO ROOT DIAM: 2.4 CM
BH CV ECHO MEAS - AO V2 MAX: 162 CM/SEC
BH CV ECHO MEAS - AO V2 MEAN: 107 CM/SEC
BH CV ECHO MEAS - AO V2 VTI: 30.2 CM
BH CV ECHO MEAS - ASC AORTA: 3 CM
BH CV ECHO MEAS - AVA(I,A): 1.9 CM^2
BH CV ECHO MEAS - AVA(I,D): 1.9 CM^2
BH CV ECHO MEAS - AVA(V,A): 1.8 CM^2
BH CV ECHO MEAS - AVA(V,D): 1.8 CM^2
BH CV ECHO MEAS - BSA(HAYCOCK): 2.2 M^2
BH CV ECHO MEAS - BSA: 2.1 M^2
BH CV ECHO MEAS - BZI_BMI: 40.2 KILOGRAMS/M^2
BH CV ECHO MEAS - BZI_METRIC_HEIGHT: 162.6 CM
BH CV ECHO MEAS - BZI_METRIC_WEIGHT: 106.1 KG
BH CV ECHO MEAS - EDV(CUBED): 68.9 ML
BH CV ECHO MEAS - EDV(MOD-SP2): 101 ML
BH CV ECHO MEAS - EDV(MOD-SP4): 93 ML
BH CV ECHO MEAS - EDV(TEICH): 74.2 ML
BH CV ECHO MEAS - EF(CUBED): 64.6 %
BH CV ECHO MEAS - EF(MOD-SP2): 62.4 %
BH CV ECHO MEAS - EF(MOD-SP4): 71 %
BH CV ECHO MEAS - EF(TEICH): 56.6 %
BH CV ECHO MEAS - ESV(CUBED): 24.4 ML
BH CV ECHO MEAS - ESV(MOD-SP2): 38 ML
BH CV ECHO MEAS - ESV(MOD-SP4): 27 ML
BH CV ECHO MEAS - ESV(TEICH): 32.2 ML
BH CV ECHO MEAS - FS: 29.3 %
BH CV ECHO MEAS - IVS/LVPW: 1.1
BH CV ECHO MEAS - IVSD: 1.7 CM
BH CV ECHO MEAS - LA DIMENSION: 4 CM
BH CV ECHO MEAS - LA/AO: 1.7
BH CV ECHO MEAS - LV DIASTOLIC VOL/BSA (35-75): 44.5 ML/M^2
BH CV ECHO MEAS - LV MASS(C)D: 280.4 GRAMS
BH CV ECHO MEAS - LV MASS(C)DI: 134.1 GRAMS/M^2
BH CV ECHO MEAS - LV MAX PG: 5.1 MMHG
BH CV ECHO MEAS - LV MEAN PG: 2 MMHG
BH CV ECHO MEAS - LV SYSTOLIC VOL/BSA (12-30): 12.9 ML/M^2
BH CV ECHO MEAS - LV V1 MAX: 113 CM/SEC
BH CV ECHO MEAS - LV V1 MEAN: 62.5 CM/SEC
BH CV ECHO MEAS - LV V1 VTI: 22 CM
BH CV ECHO MEAS - LVIDD: 4.1 CM
BH CV ECHO MEAS - LVIDS: 2.9 CM
BH CV ECHO MEAS - LVLD AP2: 8.5 CM
BH CV ECHO MEAS - LVLD AP4: 8 CM
BH CV ECHO MEAS - LVLS AP2: 7.1 CM
BH CV ECHO MEAS - LVLS AP4: 6.7 CM
BH CV ECHO MEAS - LVOT AREA (M): 2.5 CM^2
BH CV ECHO MEAS - LVOT AREA: 2.5 CM^2
BH CV ECHO MEAS - LVOT DIAM: 1.8 CM
BH CV ECHO MEAS - LVPWD: 1.6 CM
BH CV ECHO MEAS - MR ALIAS VEL: 30.8 CM/SEC
BH CV ECHO MEAS - MR ERO: 0.21 CM^2
BH CV ECHO MEAS - MR FLOW RATE: 94.8 CM^3/SEC
BH CV ECHO MEAS - MR MAX PG: 80.3 MMHG
BH CV ECHO MEAS - MR MAX VEL: 448 CM/SEC
BH CV ECHO MEAS - MR MEAN PG: 58 MMHG
BH CV ECHO MEAS - MR MEAN VEL: 359 CM/SEC
BH CV ECHO MEAS - MR PISA RADIUS: 0.7 CM
BH CV ECHO MEAS - MR PISA: 3.1 CM^2
BH CV ECHO MEAS - MR VOLUME: 32.8 ML
BH CV ECHO MEAS - MR VTI: 155 CM
BH CV ECHO MEAS - MV A MAX VEL: 75 CM/SEC
BH CV ECHO MEAS - MV DEC SLOPE: 328 CM/SEC^2
BH CV ECHO MEAS - MV E MAX VEL: 119 CM/SEC
BH CV ECHO MEAS - MV E/A: 1.6
BH CV ECHO MEAS - MV MAX PG: 5.6 MMHG
BH CV ECHO MEAS - MV MEAN PG: 2 MMHG
BH CV ECHO MEAS - MV P1/2T MAX VEL: 116 CM/SEC
BH CV ECHO MEAS - MV P1/2T: 103.6 MSEC
BH CV ECHO MEAS - MV V2 MAX: 118 CM/SEC
BH CV ECHO MEAS - MV V2 MEAN: 59.1 CM/SEC
BH CV ECHO MEAS - MV V2 VTI: 39.7 CM
BH CV ECHO MEAS - MVA P1/2T LCG: 1.9 CM^2
BH CV ECHO MEAS - MVA(P1/2T): 2.1 CM^2
BH CV ECHO MEAS - MVA(VTI): 1.4 CM^2
BH CV ECHO MEAS - PA MAX PG: 4 MMHG
BH CV ECHO MEAS - PA MEAN PG: 2 MMHG
BH CV ECHO MEAS - PA V2 MAX: 100 CM/SEC
BH CV ECHO MEAS - PA V2 MEAN: 62.9 CM/SEC
BH CV ECHO MEAS - PA V2 VTI: 22.3 CM
BH CV ECHO MEAS - RAP SYSTOLE: 5 MMHG
BH CV ECHO MEAS - RVSP: 46 MMHG
BH CV ECHO MEAS - SI(AO): 65.3 ML/M^2
BH CV ECHO MEAS - SI(CUBED): 21.3 ML/M^2
BH CV ECHO MEAS - SI(LVOT): 26.8 ML/M^2
BH CV ECHO MEAS - SI(MOD-SP2): 30.1 ML/M^2
BH CV ECHO MEAS - SI(MOD-SP4): 31.6 ML/M^2
BH CV ECHO MEAS - SI(TEICH): 20.1 ML/M^2
BH CV ECHO MEAS - SV(AO): 136.6 ML
BH CV ECHO MEAS - SV(CUBED): 44.5 ML
BH CV ECHO MEAS - SV(LVOT): 56 ML
BH CV ECHO MEAS - SV(MOD-SP2): 63 ML
BH CV ECHO MEAS - SV(MOD-SP4): 66 ML
BH CV ECHO MEAS - SV(TEICH): 42 ML
BH CV ECHO MEAS - TR MAX VEL: 308 CM/SEC
BH CV VAS BP RIGHT ARM: NORMAL MMHG

## 2020-01-09 DIAGNOSIS — I34.0 MITRAL VALVE INSUFFICIENCY, UNSPECIFIED ETIOLOGY: Primary | ICD-10-CM

## 2020-01-10 ENCOUNTER — OFFICE VISIT (OUTPATIENT)
Dept: CARDIOLOGY | Facility: CLINIC | Age: 70
End: 2020-01-10

## 2020-01-10 ENCOUNTER — LAB (OUTPATIENT)
Dept: LAB | Facility: HOSPITAL | Age: 70
End: 2020-01-10

## 2020-01-10 ENCOUNTER — TELEPHONE (OUTPATIENT)
Dept: CARDIOLOGY | Facility: CLINIC | Age: 70
End: 2020-01-10

## 2020-01-10 VITALS
BODY MASS INDEX: 40.49 KG/M2 | WEIGHT: 237.2 LBS | OXYGEN SATURATION: 98 % | SYSTOLIC BLOOD PRESSURE: 150 MMHG | DIASTOLIC BLOOD PRESSURE: 80 MMHG | HEART RATE: 72 BPM | HEIGHT: 64 IN

## 2020-01-10 DIAGNOSIS — R60.0 BILATERAL LEG EDEMA: ICD-10-CM

## 2020-01-10 DIAGNOSIS — I35.1 NONRHEUMATIC AORTIC VALVE INSUFFICIENCY: ICD-10-CM

## 2020-01-10 DIAGNOSIS — E78.2 MIXED HYPERLIPIDEMIA: ICD-10-CM

## 2020-01-10 DIAGNOSIS — R07.2 PRECORDIAL PAIN: ICD-10-CM

## 2020-01-10 DIAGNOSIS — I27.20 PULMONARY HYPERTENSION (HCC): Primary | ICD-10-CM

## 2020-01-10 DIAGNOSIS — I36.1 NON-RHEUMATIC TRICUSPID VALVE INSUFFICIENCY: ICD-10-CM

## 2020-01-10 DIAGNOSIS — I34.0 NON-RHEUMATIC MITRAL REGURGITATION: ICD-10-CM

## 2020-01-10 LAB
ALBUMIN SERPL-MCNC: 3.9 G/DL (ref 3.5–5.2)
ALBUMIN UR-MCNC: <1.2 MG/DL
ALBUMIN/GLOB SERPL: 1.2 G/DL
ALP SERPL-CCNC: 78 U/L (ref 39–117)
ALT SERPL W P-5'-P-CCNC: 20 U/L (ref 1–33)
ANION GAP SERPL CALCULATED.3IONS-SCNC: 14 MMOL/L (ref 5–15)
AST SERPL-CCNC: 17 U/L (ref 1–32)
BASOPHILS # BLD AUTO: 0.02 10*3/MM3 (ref 0–0.2)
BASOPHILS NFR BLD AUTO: 0.3 % (ref 0–1.5)
BILIRUB SERPL-MCNC: 0.5 MG/DL (ref 0.2–1.2)
BUN BLD-MCNC: 12 MG/DL (ref 8–23)
BUN/CREAT SERPL: 13.8 (ref 7–25)
CALCIUM SPEC-SCNC: 9.9 MG/DL (ref 8.6–10.5)
CHLORIDE SERPL-SCNC: 102 MMOL/L (ref 98–107)
CHOLEST SERPL-MCNC: 142 MG/DL (ref 0–200)
CO2 SERPL-SCNC: 24 MMOL/L (ref 22–29)
CREAT BLD-MCNC: 0.87 MG/DL (ref 0.57–1)
DEPRECATED RDW RBC AUTO: 47.8 FL (ref 37–54)
EOSINOPHIL # BLD AUTO: 0.11 10*3/MM3 (ref 0–0.4)
EOSINOPHIL NFR BLD AUTO: 1.5 % (ref 0.3–6.2)
ERYTHROCYTE [DISTWIDTH] IN BLOOD BY AUTOMATED COUNT: 13.8 % (ref 12.3–15.4)
GFR SERPL CREATININE-BSD FRML MDRD: 78 ML/MIN/1.73
GLOBULIN UR ELPH-MCNC: 3.2 GM/DL
GLUCOSE BLD-MCNC: 139 MG/DL (ref 65–99)
HCT VFR BLD AUTO: 32.6 % (ref 34–46.6)
HDLC SERPL-MCNC: 49 MG/DL (ref 40–60)
HGB BLD-MCNC: 10.1 G/DL (ref 12–15.9)
IMM GRANULOCYTES # BLD AUTO: 0.02 10*3/MM3 (ref 0–0.05)
IMM GRANULOCYTES NFR BLD AUTO: 0.3 % (ref 0–0.5)
LDLC SERPL CALC-MCNC: 78 MG/DL (ref 0–100)
LDLC/HDLC SERPL: 1.59 {RATIO}
LYMPHOCYTES # BLD AUTO: 2.43 10*3/MM3 (ref 0.7–3.1)
LYMPHOCYTES NFR BLD AUTO: 34.2 % (ref 19.6–45.3)
MCH RBC QN AUTO: 29.4 PG (ref 26.6–33)
MCHC RBC AUTO-ENTMCNC: 31 G/DL (ref 31.5–35.7)
MCV RBC AUTO: 95 FL (ref 79–97)
MONOCYTES # BLD AUTO: 0.67 10*3/MM3 (ref 0.1–0.9)
MONOCYTES NFR BLD AUTO: 9.4 % (ref 5–12)
NEUTROPHILS # BLD AUTO: 3.85 10*3/MM3 (ref 1.7–7)
NEUTROPHILS NFR BLD AUTO: 54.3 % (ref 42.7–76)
NRBC BLD AUTO-RTO: 0 /100 WBC (ref 0–0.2)
NT-PROBNP SERPL-MCNC: 635.4 PG/ML (ref 5–900)
PLATELET # BLD AUTO: 267 10*3/MM3 (ref 140–450)
PMV BLD AUTO: 11.5 FL (ref 6–12)
POTASSIUM BLD-SCNC: 4.5 MMOL/L (ref 3.5–5.2)
PROT SERPL-MCNC: 7.1 G/DL (ref 6–8.5)
RBC # BLD AUTO: 3.43 10*6/MM3 (ref 3.77–5.28)
SODIUM BLD-SCNC: 140 MMOL/L (ref 136–145)
TRIGL SERPL-MCNC: 76 MG/DL (ref 0–150)
VLDLC SERPL-MCNC: 15.2 MG/DL
WBC NRBC COR # BLD: 7.1 10*3/MM3 (ref 3.4–10.8)

## 2020-01-10 PROCEDURE — 80061 LIPID PANEL: CPT

## 2020-01-10 PROCEDURE — 80053 COMPREHEN METABOLIC PANEL: CPT

## 2020-01-10 PROCEDURE — 85025 COMPLETE CBC W/AUTO DIFF WBC: CPT

## 2020-01-10 PROCEDURE — 99214 OFFICE O/P EST MOD 30 MIN: CPT | Performed by: INTERNAL MEDICINE

## 2020-01-10 PROCEDURE — 36415 COLL VENOUS BLD VENIPUNCTURE: CPT

## 2020-01-10 PROCEDURE — 83880 ASSAY OF NATRIURETIC PEPTIDE: CPT

## 2020-01-10 PROCEDURE — 93000 ELECTROCARDIOGRAM COMPLETE: CPT | Performed by: INTERNAL MEDICINE

## 2020-01-10 PROCEDURE — 82043 UR ALBUMIN QUANTITATIVE: CPT | Performed by: INTERNAL MEDICINE

## 2020-01-10 NOTE — PATIENT INSTRUCTIONS
Dr. Saravia has recommended a Six-Minute Walk Test    What Is the Six-Minute Walk Test?    The American Thor[employer] Society describes the six-minute walk test as a measure of functional status or fitness. It is used as a simple measure of aerobic exercise capacity. The results of this test may or may not lead your doctor to do more sophisticated measures of your heart and lung function. During this test, you walk at your normal pace for six minutes. This test can be used to monitor your response to treatments for heart, lung and other health problems. This test is commonly used for people with pulmonary hypertension, interstitial lung disease, pre-lung transplant evaluation or COPD.      What to Expect When Doing the Test      Preparing for your test:  · Wear clothes and shoes that are comfortable.  · You may use your usual walking aids such as a cane or walker, if needed.  · It is okay to eat a light meal prior to your test.  · Take your usual medications.  · Do not exercise within two hours of testing.      During the test:  · The haylee will measure your blood pressure, pulse and oxygen level usually with a pulse oximeter before you start to walk.  · You should be given the following instructions: The object of the test is to walk as far as possible for six minutes. You will walk at your normal pace to a chair or cone, and turn around. And you continue to walk back and forth for six minutes.  · Let the staff know if you are having chest pain or breathing difficulty.  · It is acceptable to slow down, rest or stop. After every minute interval, you will be given an update.      Safety:  · The haylee will watch to see if you have breathing difficulty or chest pain.  · Oxygen and other supplies will be nearby if you need them.      Understanding the Results  The results of your test are then compared to what is known to be normal for people in your weight, height, gender and age categories. They can be used to estimate  response to treatment, especially if repeated after a time interval, for instance six months or a year later. After your test, your provider may change your medication or exercise program based on your results.      What Are the Risks?  This is a low-risk medical evaluation. Medical help is easily available while the test is being done.          This content was developed in partnership with the [employer], the philanthropic arm of the American [employer].  Approved by Scientific and Medical Editorial Review Panel. Last reviewed May 31, 2017.        Preventing Unhealthy Weight Gain, Adult  Staying at a healthy weight is important to your overall health. When fat builds up in your body, you may become overweight or obese. Being overweight or obese increases your risk of developing certain health problems, such as heart disease, diabetes, sleeping problems, joint problems, and some types of cancer.  Unhealthy weight gain is often the result of making unhealthy food choices or not getting enough exercise. You can make changes to your lifestyle to prevent obesity and stay as healthy as possible.  What nutrition changes can be made?    · Eat only as much as your body needs. To do this:  ? Pay attention to signs that you are hungry or full. Stop eating as soon as you feel full.  ? If you feel hungry, try drinking water first before eating. Drink enough water so your urine is clear or pale yellow.  ? Eat smaller portions. Pay attention to portion sizes when eating out.  ? Look at serving sizes on food labels. Most foods contain more than one serving per container.  ? Eat the recommended number of calories for your gender and activity level. For most active people, a daily total of 2,000 calories is appropriate. If you are trying to lose weight or are not very active, you may need to eat fewer calories. Talk with your health care provider or a diet and nutrition specialist (dietitian) about how many  calories you need each day.  · Choose healthy foods, such as:  ? Fruits and vegetables. At each meal, try to fill at least half of your plate with fruits and vegetables.  ? Whole grains, such as whole-wheat bread, brown rice, and quinoa.  ? Lean meats, such as chicken or fish.  ? Other healthy proteins, such as beans, eggs, or tofu.  ? Healthy fats, such as nuts, seeds, fatty fish, and olive oil.  ? Low-fat or fat-free dairy products.  · Check food labels, and avoid food and drinks that:  ? Are high in calories.  ? Have added sugar.  ? Are high in sodium.  ? Have saturated fats or trans fats.  · Cook foods in healthier ways, such as by baking, broiling, or grilling.  · Make a meal plan for the week, and shop with a grocery list to help you stay on track with your purchases. Try to avoid going to the grocery store when you are hungry.  · When grocery shopping, try to shop around the outside of the store first, where the fresh foods are. Doing this helps you to avoid prepackaged foods, which can be high in sugar, salt (sodium), and fat.  What lifestyle changes can be made?    · Exercise for 30 or more minutes on 5 or more days each week. Exercising may include brisk walking, yard work, biking, running, swimming, and team sports like basketball and soccer. Ask your health care provider which exercises are safe for you.  · Do muscle-strengthening activities, such as lifting weights or using resistance bands, on 2 or more days a week.  · Do not use any products that contain nicotine or tobacco, such as cigarettes and e-cigarettes. If you need help quitting, ask your health care provider.  · Limit alcohol intake to no more than 1 drink a day for nonpregnant women and 2 drinks a day for men. One drink equals 12 oz of beer, 5 oz of wine, or 1½ oz of hard liquor.  · Try to get 7-9 hours of sleep each night.  What other changes can be made?  · Keep a food and activity journal to keep track of:  ? What you ate and how many  calories you had. Remember to count the calories in sauces, dressings, and side dishes.  ? Whether you were active, and what exercises you did.  ? Your calorie, weight, and activity goals.  · Check your weight regularly. Track any changes. If you notice you have gained weight, make changes to your diet or activity routine.  · Avoid taking weight-loss medicines or supplements. Talk to your health care provider before starting any new medicine or supplement.  · Talk to your health care provider before trying any new diet or exercise plan.  Why are these changes important?  Eating healthy, staying active, and having healthy habits can help you to prevent obesity. Those changes also:  · Help you manage stress and emotions.  · Help you connect with friends and family.  · Improve your self-esteem.  · Improve your sleep.  · Prevent long-term health problems.  What can happen if changes are not made?  Being obese or overweight can cause you to develop joint or bone problems, which can make it hard for you to stay active or do activities you enjoy. Being obese or overweight also puts stress on your heart and lungs and can lead to health problems like diabetes, heart disease, and some cancers.  Where to find more information  Talk with your health care provider or a dietitian about healthy eating and healthy lifestyle choices. You may also find information from:  · U.[place]. Department of Agriculture, MyPlate: www.choosemyplate.gov  · American Heart Association: www.heart.org  · Centers for Disease Control and Prevention: www.cdc.gov  Summary  · Staying at a healthy weight is important to your overall health. It helps you to prevent certain diseases and health problems, such as heart disease, diabetes, joint problems, sleep disorders, and some types of cancer.  · Being obese or overweight can cause you to develop joint or bone problems, which can make it hard for you to stay active or do activities you enjoy.  · You can prevent  unhealthy weight gain by eating a healthy diet, exercising regularly, not smoking, limiting alcohol, and getting enough sleep.  · Talk with your health care provider or a dietitian for guidance about healthy eating and healthy lifestyle choices.  This information is not intended to replace advice given to you by your health care provider. Make sure you discuss any questions you have with your health care provider.  Document Released: 12/19/2017 Document Revised: 09/28/2018 Document Reviewed: 01/24/2018  [employer] Interactive Patient Education © 2019 [employer].      Natriuretic Peptides Test  Why am I having this test?  The natriuretic peptides test helps your health care provider manage heart failure and other heart abnormalities. You may have this test:  · If you have symptoms that may be caused by heart failure or a heart attack, such as shortness of breath or fatigue.  · To monitor the treatment and advancement (progression) of heart disease.  · To check for signs that your body's disease-fighting (immune) system is attacking your newly transplanted heart (rejection).  This test can help your health care provider determine if your symptoms are caused from heart failure, or from another condition.  What is being tested?  Natriuretic peptides (NPs) are hormones that the heart cells make in response to heart failure. They help maintain blood pressure and the balance of fluids in the body when the heart is not able to do so. This test measures the amount of three types of NPs in the blood:  · ANP (atrial natriuretic peptide). This is made by the part of the heart that receives blood from the body (atrium).  · BNP (B-type natriuretic peptide). This is made by the heart's main pumping chamber (left ventricle).  · CNP (C-type natriuretic peptide). This is made by the lining of the blood vessels (endothelial cells).  What kind of sample is taken?    A blood sample is required for this test. It is usually collected by  inserting a needle into a blood vessel.  Tell a health care provider about:  · Any allergies you have.  · All medicines you are taking, including vitamins, herbs, eye drops, creams, and over-the-counter medicines.  · Any surgeries you have had.  · Any medical conditions you have.  · Whether you are pregnant or may be pregnant.  How are the results reported?  Your test results will be reported as values for each type of NP. Your health care provider will compare your results to normal ranges that were established after testing a large group of people (reference ranges). Reference ranges may vary among labs and hospitals. For this test, common normal reference ranges are:  · ANP: 22-77 pg/mL or 22-77 ng/L (SI units).  · BNP: 0-100 pg/mL or 0-100 ng/L (SI units).  · CNP: These values are yet to be determined.  What do the results mean?  Results that are within the reference ranges are considered normal. These results may mean that:  · You do not have heart failure.  · You have heart failure or a different heart disease, and your treatment is working effectively.  Results that are higher than the reference ranges may mean that:  · You have heart failure.  · You are having a heart attack.  · You have high blood pressure (hypertension).  · Your body is rejecting your transplanted heart.  Talk with your health care provider about what your results mean.  Questions to ask your health care provider  Ask your health care provider, or the department that is doing the test:  · When will my results be ready?  · How will I get my results?  · What are my treatment options?  · What other tests do I need?  · What are my next steps?  Summary  · The natriuretic peptides test helps diagnose heart failure and other heart abnormalities.  · Natriuretic peptides (NPs) are hormones that the heart cells make in response to heart failure. They help maintain blood pressure and the balance of fluids in the body when the heart is not able to do  so.  · You may have this test if you have symptoms that may be caused by heart failure or a heart attack, such as shortness of breath or fatigue.  This information is not intended to replace advice given to you by your health care provider. Make sure you discuss any questions you have with your health care provider.  Document Released: 01/20/2006 Document Revised: 08/28/2018 Document Reviewed: 08/28/2018  [employer] Interactive Patient Education © 2019 Elsevier Inc.

## 2020-01-10 NOTE — TELEPHONE ENCOUNTER
Attempted to contact patient with appt date and time for dse. Voicemail left for patient asking her to contact the office

## 2020-01-10 NOTE — PROGRESS NOTES
Cardiovascular Medicine      Maximiliano Saravia M.D., Ph.D., [facility]           History of Present Illness      1.PAH  2. MR  3. TR  4.AI  5.  Risks: HTN, HLD, DM2    Bacilio Shaffer is a 69 y.o. female returns to clinic for pulmonary arterial hypertension.  This is the first time this has been discovered.  She has a history of valvular disease that I was seeing her for.  She has a history of an abnormal echocardiogram in 2017.  The patient's PA pressure was normal at that time.  She had reportedly mild aortic regurgitation, mitral regurgitation and tricuspid regurgitation.  She remains in valve clinic for clinical surveillance.  She was asymptomatic at her last appointment.  I updated her echo prior to her appointment today.  She returns for results.  She now has mild pulmonary hypertension with a PA pressure in the low 40s.  She continues to have mild aortic regurgitation and tricuspid regurgitation.  However, she likely has a more moderate degree of mitral regurgitation.  LV diameters were normal.  She now has exertional dyspnea.  She has been having some CP. This is a tightness. This is non-exertional. She is now having LE edema.   No increased cardiac awareness.  No dizziness, lightheadedness or syncope.  Her hypertension and dyslipidemia continues to be managed by her PCP.  She is medication compliant and denies side effects.      Review of Systems   Cardiovascular: Positive for chest pain, dyspnea on exertion and leg swelling. Negative for near-syncope, orthopnea, palpitations, paroxysmal nocturnal dyspnea and syncope.   Respiratory: Positive for cough and shortness of breath. Negative for hemoptysis.        family history includes Diabetes in an other family member; Heart disease in an other family member; Hypertension in an other family member; Stroke in an other family member.     reports that she has never smoked. She has never used smokeless tobacco. Drug use questions deferred to the physician. She  reports that she does not drink alcohol.    Allergies   Allergen Reactions   • Atenolol    • Lortab [Hydrocodone-Acetaminophen]          Current Outpatient Medications:   •  acetaminophen (TYLENOL 8 HOUR) 650 MG 8 hr tablet, Take 650 mg by mouth Every 8 (Eight) Hours As Needed for Mild Pain ., Disp: , Rfl:   •  amLODIPine (NORVASC) 10 MG tablet, Take 10 mg by mouth As Needed., Disp: , Rfl:   •  aspirin 81 MG EC tablet, Take 81 mg by mouth Daily., Disp: , Rfl:   •  atorvastatin (LIPITOR) 20 MG tablet, 40 mg Daily., Disp: , Rfl:   •  carvedilol (COREG) 6.25 MG tablet, 12.5 mg 2 (Two) Times a Day., Disp: , Rfl:   •  gabapentin (NEURONTIN) 600 MG tablet, Take 300 mg by mouth Daily., Disp: , Rfl:   •  losartan (COZAAR) 100 MG tablet, Daily., Disp: , Rfl:   •  metFORMIN (GLUCOPHAGE) 500 MG tablet, Take 500 mg by mouth 2 (Two) Times a Day With Meals., Disp: , Rfl:   •  Prenatal Vit-Fe Fumarate-FA (PRENATAL, CLASSIC, VITAMIN) 28-0.8 MG tablet tablet, Take  by mouth Daily., Disp: , Rfl:     Physical Exam:  There were no vitals filed for this visit.  There is no height or weight on file to calculate BMI.    GEN: alert, appears stated age and cooperative  Body Habitus: overweight  HEENT: Head: Normocephalic, no lesions, without obvious abnormality.  JVP: 6 cm of water at 45 degrees HJR: absent      Heart rate: normal  Heart Rhythm: regular     Heart Sounds: S1: normal intensity  S2: normal intensity  S3: absent   S4: absent  Opening Snap: absent  A2-OS:  N/A  Pericardial rub: absent    Ejection click: None      Murmurs: Systolic: I/VI GREGORY at RSB  Diastolic: none  Extremity: Moves spontaneously    DATA REVIEWED:         Results for orders placed in visit on 01/04/19   Adult Transthoracic Echo Complete W/ Cont if Necessary Per Protocol    Narrative · Left ventricle systolic function is normal at 61-65%. Moderate   concentric hypertrophy with grade 1 diastolic dysfunction.  · Right ventricle systolic function is normal.  · Mild  aortic insufficiency.  · Mitral annular calcification with mild to moderate mitral regurgitation.  · Mild tricuspid regurgitation with PA systolic pressure of 46 mmHg. Mild   pulmonary hypertension is present.          Assessment/Plan      1. Pulmonary hypertension (CMS/HCC). PAH/PVH. WHO Group 2; FC: III.  RV status: Normal. Perfusion status: good.    -Discussed evaluation, treatment and usual course.  -No current indication for PAH-specific medications  -No compelling indication at this time for RHC  -Will continue active clinical surveillance.  Signs and symptoms of worsening PAH and RV failure were discussed.  -I have asked the patient that if they were to move to be certain they see someone with expertise in PAH. These providers can be located at [email or web address].  -DSE: Risks and benefits discussed  -CBC, no diff;, Chem-20;, LIPIDS;, BNP; and Microalbumin;    2. Nonrheumatic aortic valve insufficiency/ Non-rheumatic tricuspid valve insufficiency/ Non-rheumatic mitral regurgitation. ACC stage B.  There are no surgical indications at this time.  · The patient has been advised to remain in clinical surveillance every 6 months.  · Signs and symptoms of worsening valve disease discussed.  I've asked the patient contact me for an earlier appointment if these develop.  · I've recommended a repeat 2D TTE every 2 years.  · TTE due: 2022.  No indication based on 2017 ACC/AHA guidelines for IE prophylaxis for dental procedures: Optimal oral health is recommended through regular professional dental care and the use of appropriate dental products, such as manual, powered, and ultrasonic toothbrushes; dental floss; and other plaque-removal devices  There is INDICATION FOR IE prophylaxis for dental procedures based on 2017 ACC/AHA guidelines: Optimal oral health is recommended through regular professional dental care and the use of appropriate     3. Cardiac Risk Assessments based on 2019 ACCF guidelines:  -A  team-based care approach is recommended for the control of risk factors associated with ASCAD.  As such, Bacilio Shaffer was requested to have ongoing follow-up with their PCP.   Continue follow-up visits with PCP for monitoring of labs; diet emphasizing intake of vegetables, fruits, nuts, whole grains and fish is recommended. Physical activity recommendations were provided.   Essential HTN is a significant risk factor for stroke, heart disease and vascular disease. I've recommended the patient continue current medications, if any, as prescribed by the primary care provider. I recommended they have close follow-up for ongoing mgmt of this and the medical comorbidities associated with HTN with their PCP.  They were also provided with information regarding maintaining a healthy weight, heart-healthy dietary pattern DASH information.  Goal blood pressure less than 130/80.  The patient's BMI is recommended to be calculated at least annually.  The patient's BMI is Body mass index is 40.72 kg/m²..  This places the patient in weight class:  Obese Class III extreme obesity: > or equal to 40kg/m2. They have been asked to have close PCP follow-up.  Tobacco status assessed at every visits.  The patient's nicotine status: has never smoked    4. CPS. Bacilio Shaffer has a chest pain syndrome with pain complaints that are best characterized as atypical.  The patient is High Risk.  An ischemia evaluation is indicated. EKG: normal EKG, normal sinus rhythm.  -Risks/Benefits discussed. The patient is not able to exercise. Reason for inability to exercise: orthopedic condition(s): back pain, arthritis and knee pain, respiratory condition(s): exertional intolerance.   -A hand out was provided on the type of stress test and how to handle additional chest pain complaints. Questions answered.   -I have asked the patient to call 911 or be seen in the ED for further CP complaints.   -Will plan on further evaluation with:  -Stress  echocardiogram    5. LE edema.  -Labs  -DSE  -Start Furosemide 20mg a day    Return in about 2 months (around 3/10/2020).

## 2020-01-21 ENCOUNTER — TELEPHONE (OUTPATIENT)
Dept: CARDIOLOGY | Facility: CLINIC | Age: 70
End: 2020-01-21

## 2020-01-21 ENCOUNTER — HOSPITAL ENCOUNTER (OUTPATIENT)
Dept: CARDIOLOGY | Facility: HOSPITAL | Age: 70
Discharge: HOME | End: 2020-01-21
Admitting: INTERNAL MEDICINE

## 2020-01-21 VITALS — WEIGHT: 226 LBS | BODY MASS INDEX: 38.79 KG/M2

## 2020-01-21 DIAGNOSIS — R07.89 CHEST PAIN, ATYPICAL: Primary | ICD-10-CM

## 2020-01-21 LAB
BH CV ECHO MEAS - BSA(HAYCOCK): 2.2 M^2
BH CV ECHO MEAS - BSA: 2.1 M^2
BH CV ECHO MEAS - BZI_BMI: 38.8 KILOGRAMS/M^2
BH CV ECHO MEAS - BZI_METRIC_HEIGHT: 162.6 CM
BH CV ECHO MEAS - BZI_METRIC_WEIGHT: 102.5 KG
BH CV STRESS BP STAGE 1: NORMAL
BH CV STRESS BP STAGE 2: NORMAL
BH CV STRESS BP STAGE 3: NORMAL
BH CV STRESS DOSE DOBUTAMINE STAGE 1: 10
BH CV STRESS DOSE DOBUTAMINE STAGE 2: 20
BH CV STRESS DOSE DOBUTAMINE STAGE 3: 30
BH CV STRESS DOSE DOBUTAMINE STAGE 4: 40
BH CV STRESS DURATION MIN STAGE 1: 3
BH CV STRESS DURATION MIN STAGE 2: 3
BH CV STRESS DURATION MIN STAGE 3: 3
BH CV STRESS DURATION MIN STAGE 4: 1
BH CV STRESS DURATION SEC STAGE 1: 0
BH CV STRESS DURATION SEC STAGE 2: 0
BH CV STRESS DURATION SEC STAGE 3: 0
BH CV STRESS DURATION SEC STAGE 4: 26
BH CV STRESS ECHO POST STRESS EJECTION FRACTION EF: 70 %
BH CV STRESS HR STAGE 1: 79
BH CV STRESS HR STAGE 2: 76
BH CV STRESS HR STAGE 3: 120
BH CV STRESS HR STAGE 4: 117
BH CV STRESS PROTOCOL 1: NORMAL
BH CV STRESS RECOVERY BP: NORMAL MMHG
BH CV STRESS RECOVERY HR: 95 BPM
BH CV STRESS STAGE 1: 1
BH CV STRESS STAGE 2: 2
BH CV STRESS STAGE 3: 3
BH CV STRESS STAGE 4: 4
MAXIMAL PREDICTED HEART RATE: 150 BPM
PERCENT MAX PREDICTED HR: 84 %
STRESS BASELINE BP: NORMAL MMHG
STRESS BASELINE HR: 71 BPM
STRESS PERCENT HR: 99 %
STRESS POST ESTIMATED WORKLOAD: 1 METS
STRESS POST PEAK BP: NORMAL MMHG
STRESS POST PEAK HR: 126 BPM
STRESS TARGET HR: 128 BPM

## 2020-01-21 PROCEDURE — 25010000002 DOBUTAMINE: Performed by: INTERNAL MEDICINE

## 2020-01-21 PROCEDURE — 25010000002 ATROPINE PER 0.01 MG: Performed by: INTERNAL MEDICINE

## 2020-01-21 PROCEDURE — 93351 STRESS TTE COMPLETE: CPT | Performed by: INTERNAL MEDICINE

## 2020-01-21 PROCEDURE — 93350 STRESS TTE ONLY: CPT

## 2020-01-21 PROCEDURE — 93325 DOPPLER ECHO COLOR FLOW MAPG: CPT

## 2020-01-21 PROCEDURE — 93017 CV STRESS TEST TRACING ONLY: CPT

## 2020-01-21 PROCEDURE — 93320 DOPPLER ECHO COMPLETE: CPT

## 2020-01-21 RX ORDER — SODIUM CHLORIDE 0.9 % (FLUSH) 0.9 %
10 SYRINGE (ML) INJECTION ONCE
Status: COMPLETED | OUTPATIENT
Start: 2020-01-21 | End: 2020-01-21

## 2020-01-21 RX ORDER — ATROPINE SULFATE 1 MG/ML
0.5 INJECTION, SOLUTION INTRAMUSCULAR; INTRAVENOUS; SUBCUTANEOUS ONCE
Status: COMPLETED | OUTPATIENT
Start: 2020-01-21 | End: 2020-01-21

## 2020-01-21 RX ADMIN — ATROPINE SULFATE 0.5 MG: 1 INJECTION, SOLUTION INTRAMUSCULAR; INTRAVENOUS; SUBCUTANEOUS at 14:16

## 2020-01-21 RX ADMIN — Medication 10 ML: at 14:16

## 2020-01-21 RX ADMIN — DOBUTAMINE 10 MCG/KG/MIN: 12.5 INJECTION, SOLUTION, CONCENTRATE INTRAVENOUS at 14:17

## 2020-01-21 NOTE — TELEPHONE ENCOUNTER
Attempted to contact patient regarding stress echo.  Voicemail left asking the patient to contact the office.

## 2020-01-23 DIAGNOSIS — R07.2 PRECORDIAL PAIN: Primary | ICD-10-CM

## 2020-02-12 ENCOUNTER — HOSPITAL ENCOUNTER (OUTPATIENT)
Dept: NUCLEAR MEDICINE | Facility: HOSPITAL | Age: 70
Discharge: HOME | End: 2020-02-12

## 2020-02-12 DIAGNOSIS — R07.2 PRECORDIAL PAIN: ICD-10-CM

## 2020-02-12 PROCEDURE — 78452 HT MUSCLE IMAGE SPECT MULT: CPT

## 2020-02-12 PROCEDURE — 0 TECHNETIUM SESTAMIBI: Performed by: INTERNAL MEDICINE

## 2020-02-12 PROCEDURE — 93017 CV STRESS TEST TRACING ONLY: CPT

## 2020-02-12 PROCEDURE — A9500 TC99M SESTAMIBI: HCPCS | Performed by: INTERNAL MEDICINE

## 2020-02-12 RX ADMIN — TECHNETIUM TC 99M SESTAMIBI 1 DOSE: 1 INJECTION INTRAVENOUS at 07:24

## 2020-02-13 ENCOUNTER — HOSPITAL ENCOUNTER (OUTPATIENT)
Dept: CARDIOLOGY | Facility: HOSPITAL | Age: 70
Discharge: HOME | End: 2020-02-13

## 2020-02-13 ENCOUNTER — HOSPITAL ENCOUNTER (OUTPATIENT)
Dept: NUCLEAR MEDICINE | Facility: HOSPITAL | Age: 70
Discharge: HOME | End: 2020-02-13

## 2020-02-13 LAB
BH CV STRESS BP STAGE 1: NORMAL
BH CV STRESS COMMENTS STAGE 1: NORMAL
BH CV STRESS DOSE REGADENOSON STAGE 1: 0.4
BH CV STRESS DURATION MIN STAGE 1: 0
BH CV STRESS DURATION SEC STAGE 1: 10
BH CV STRESS HR STAGE 1: 87
BH CV STRESS PROTOCOL 1: NORMAL
BH CV STRESS RECOVERY BP: NORMAL MMHG
BH CV STRESS RECOVERY HR: 82 BPM
BH CV STRESS STAGE 1: 1
LV EF NUC BP: 68 %
MAXIMAL PREDICTED HEART RATE: 150 BPM
PERCENT MAX PREDICTED HR: 62 %
STRESS BASELINE BP: NORMAL MMHG
STRESS BASELINE HR: 67 BPM
STRESS PERCENT HR: 73 %
STRESS POST ESTIMATED WORKLOAD: 1 METS
STRESS POST PEAK BP: NORMAL MMHG
STRESS POST PEAK HR: 93 BPM
STRESS TARGET HR: 128 BPM

## 2020-02-13 PROCEDURE — 25010000002 REGADENOSON 0.4 MG/5ML SOLUTION: Performed by: INTERNAL MEDICINE

## 2020-02-13 PROCEDURE — 93018 CV STRESS TEST I&R ONLY: CPT | Performed by: INTERNAL MEDICINE

## 2020-02-13 PROCEDURE — 0 TECHNETIUM SESTAMIBI: Performed by: INTERNAL MEDICINE

## 2020-02-13 PROCEDURE — 93016 CV STRESS TEST SUPVJ ONLY: CPT | Performed by: INTERNAL MEDICINE

## 2020-02-13 PROCEDURE — 78452 HT MUSCLE IMAGE SPECT MULT: CPT | Performed by: INTERNAL MEDICINE

## 2020-02-13 PROCEDURE — A9500 TC99M SESTAMIBI: HCPCS | Performed by: INTERNAL MEDICINE

## 2020-02-13 RX ORDER — SODIUM CHLORIDE 0.9 % (FLUSH) 0.9 %
10 SYRINGE (ML) INJECTION ONCE
Status: COMPLETED | OUTPATIENT
Start: 2020-02-13 | End: 2020-02-13

## 2020-02-13 RX ADMIN — SODIUM CHLORIDE, PRESERVATIVE FREE 10 ML: 5 INJECTION INTRAVENOUS at 08:40

## 2020-02-13 RX ADMIN — REGADENOSON 0.4 MG: 0.08 INJECTION, SOLUTION INTRAVENOUS at 08:40

## 2020-02-13 RX ADMIN — TECHNETIUM TC 99M SESTAMIBI 1 DOSE: 1 INJECTION INTRAVENOUS at 08:41

## 2020-02-25 ENCOUNTER — PROCEDURE VISIT (OUTPATIENT)
Dept: CARDIOLOGY | Facility: CLINIC | Age: 70
End: 2020-02-25

## 2020-02-25 ENCOUNTER — OFFICE VISIT (OUTPATIENT)
Dept: PULMONOLOGY | Facility: CLINIC | Age: 70
End: 2020-02-25

## 2020-02-25 DIAGNOSIS — I27.20 PULMONARY HYPERTENSION (HCC): Primary | ICD-10-CM

## 2020-02-25 DIAGNOSIS — I27.20 PULMONARY HYPERTENSION (HCC): ICD-10-CM

## 2020-02-25 PROCEDURE — 94727 GAS DIL/WSHOT DETER LNG VOL: CPT | Performed by: INTERNAL MEDICINE

## 2020-02-25 PROCEDURE — 94060 EVALUATION OF WHEEZING: CPT | Performed by: INTERNAL MEDICINE

## 2020-02-25 PROCEDURE — 94729 DIFFUSING CAPACITY: CPT | Performed by: INTERNAL MEDICINE

## 2020-02-25 PROCEDURE — 94618 PULMONARY STRESS TESTING: CPT | Performed by: INTERNAL MEDICINE

## 2020-02-25 NOTE — PROCEDURES
Pulmonary Function Test  Performed by: Mary Srinivasan MD  Authorized by: Maximiliano Saravia MD PhD      Pre Drug    FVC: 62%   FEV1: 61%   FEV1/FVC: 76%   T%   RV: 119%   DLCO: 45%     Post Drug    FVC: 46%   FEV1: 45%   FEV1/FVC: 76%      Change in % (calculated):    FVC: -26   FEV1: -26      Interpretation   Spirometry    Spirometry shows mild restriction. The patient's response to bronchodilators is insignificant.  Post-bronchodilator the ratio shows severe restriction.  Review of FVL curve    Effort is normal.   Lung Volume Measurements  Measurements show: reduced lung volumes consistent with restriction and elevated residual volume consistent with gas trapping.  Diffusion Capacity  The patient's diffusion capacity is reduced.

## 2020-02-25 NOTE — PROGRESS NOTES
Bacilio Sahffer  Procedure: 6 Minute Walk Test   Indication:pulm htn    Pretest: BP:152/84               HR:86               Sa02:98               Dyspnea:1               Fatigue:3    Post Test: /86               HR:103               Sa02:97               Dyspnea:4               Fatigue:4    First 6MWT:yes    Supplemental oxygen during test:no    Stopped before 6 minutes:no    Pauses:2    Results in distance walked:272.46 m    Did individual experience any pain or discomfort:no    Attestation:  I was immediately available for the above test and agree with the data.     NYHA Functional Class III.      Maximiliano Saravia MD PhD    -----------------------------------------------------------------------------------------------------------------  [facility] 6MWT to the ATS guidelines for 6MWT (2002). Predicted distance is from:      -------------------------------------------------------------------------------------------------------------

## 2020-03-04 ENCOUNTER — TELEPHONE (OUTPATIENT)
Dept: CARDIOLOGY | Facility: CLINIC | Age: 70
End: 2020-03-04

## 2020-03-04 NOTE — TELEPHONE ENCOUNTER
Dr. Hansen contacted me from [facility] regarding the patient.  She was admitted with an episode of syncope.  Neurology was concerned about a cardiac etiology.  He is requested to transfer.  I asked him to contact the transfer center to arrange admission to the hospitalist service.  I also paged the admitting hospitalist to inform them of the phone call.  Maximiliano Saravia MD PhD

## 2020-03-05 ENCOUNTER — APPOINTMENT (OUTPATIENT)
Dept: GENERAL RADIOLOGY | Facility: HOSPITAL | Age: 70
End: 2020-03-05

## 2020-03-05 ENCOUNTER — HOSPITAL ENCOUNTER (OUTPATIENT)
Facility: HOSPITAL | Age: 70
Setting detail: OBSERVATION
Discharge: HOME | End: 2020-03-06
Attending: FAMILY MEDICINE | Admitting: INTERNAL MEDICINE

## 2020-03-05 PROBLEM — R55 SYNCOPE: Status: ACTIVE | Noted: 2020-03-05

## 2020-03-05 LAB
ALBUMIN SERPL-MCNC: 4.1 G/DL (ref 3.5–5.2)
ALBUMIN/GLOB SERPL: 1.4 G/DL
ALP SERPL-CCNC: 75 U/L (ref 39–117)
ALT SERPL W P-5'-P-CCNC: 18 U/L (ref 1–33)
ANION GAP SERPL CALCULATED.3IONS-SCNC: 12 MMOL/L (ref 5–15)
AST SERPL-CCNC: 15 U/L (ref 1–32)
BASOPHILS # BLD AUTO: 0.02 10*3/MM3 (ref 0–0.2)
BASOPHILS NFR BLD AUTO: 0.3 % (ref 0–1.5)
BILIRUB SERPL-MCNC: 0.3 MG/DL (ref 0.2–1.2)
BUN BLD-MCNC: 12 MG/DL (ref 8–23)
BUN/CREAT SERPL: 11.5 (ref 7–25)
CALCIUM SPEC-SCNC: 9.6 MG/DL (ref 8.6–10.5)
CHLORIDE SERPL-SCNC: 105 MMOL/L (ref 98–107)
CO2 SERPL-SCNC: 25 MMOL/L (ref 22–29)
CREAT BLD-MCNC: 1.04 MG/DL (ref 0.57–1)
DEPRECATED RDW RBC AUTO: 44.1 FL (ref 37–54)
EOSINOPHIL # BLD AUTO: 0.33 10*3/MM3 (ref 0–0.4)
EOSINOPHIL NFR BLD AUTO: 4.2 % (ref 0.3–6.2)
ERYTHROCYTE [DISTWIDTH] IN BLOOD BY AUTOMATED COUNT: 13.2 % (ref 12.3–15.4)
GFR SERPL CREATININE-BSD FRML MDRD: 63 ML/MIN/1.73
GLOBULIN UR ELPH-MCNC: 2.9 GM/DL
GLUCOSE BLD-MCNC: 144 MG/DL (ref 65–99)
GLUCOSE BLDC GLUCOMTR-MCNC: 150 MG/DL (ref 70–130)
HCT VFR BLD AUTO: 30.1 % (ref 34–46.6)
HGB BLD-MCNC: 9.8 G/DL (ref 12–15.9)
IMM GRANULOCYTES # BLD AUTO: 0.03 10*3/MM3 (ref 0–0.05)
IMM GRANULOCYTES NFR BLD AUTO: 0.4 % (ref 0–0.5)
LYMPHOCYTES # BLD AUTO: 2.35 10*3/MM3 (ref 0.7–3.1)
LYMPHOCYTES NFR BLD AUTO: 29.9 % (ref 19.6–45.3)
MAGNESIUM SERPL-MCNC: 1.9 MG/DL (ref 1.6–2.4)
MCH RBC QN AUTO: 30.1 PG (ref 26.6–33)
MCHC RBC AUTO-ENTMCNC: 32.6 G/DL (ref 31.5–35.7)
MCV RBC AUTO: 92.3 FL (ref 79–97)
MONOCYTES # BLD AUTO: 0.59 10*3/MM3 (ref 0.1–0.9)
MONOCYTES NFR BLD AUTO: 7.5 % (ref 5–12)
NEUTROPHILS # BLD AUTO: 4.53 10*3/MM3 (ref 1.7–7)
NEUTROPHILS NFR BLD AUTO: 57.7 % (ref 42.7–76)
NRBC BLD AUTO-RTO: 0 /100 WBC (ref 0–0.2)
PHOSPHATE SERPL-MCNC: 2.8 MG/DL (ref 2.5–4.5)
PLATELET # BLD AUTO: 258 10*3/MM3 (ref 140–450)
PMV BLD AUTO: 11.5 FL (ref 6–12)
POTASSIUM BLD-SCNC: 3.6 MMOL/L (ref 3.5–5.2)
PROT SERPL-MCNC: 7 G/DL (ref 6–8.5)
RBC # BLD AUTO: 3.26 10*6/MM3 (ref 3.77–5.28)
SODIUM BLD-SCNC: 142 MMOL/L (ref 136–145)
TROPONIN T SERPL-MCNC: <0.01 NG/ML (ref 0–0.03)
TSH SERPL DL<=0.05 MIU/L-ACNC: 2.08 UIU/ML (ref 0.27–4.2)
WBC NRBC COR # BLD: 7.85 10*3/MM3 (ref 3.4–10.8)

## 2020-03-05 PROCEDURE — 83735 ASSAY OF MAGNESIUM: CPT | Performed by: INTERNAL MEDICINE

## 2020-03-05 PROCEDURE — 84100 ASSAY OF PHOSPHORUS: CPT | Performed by: INTERNAL MEDICINE

## 2020-03-05 PROCEDURE — 84484 ASSAY OF TROPONIN QUANT: CPT | Performed by: INTERNAL MEDICINE

## 2020-03-05 PROCEDURE — G0378 HOSPITAL OBSERVATION PER HR: HCPCS

## 2020-03-05 PROCEDURE — 84443 ASSAY THYROID STIM HORMONE: CPT | Performed by: INTERNAL MEDICINE

## 2020-03-05 PROCEDURE — 82962 GLUCOSE BLOOD TEST: CPT

## 2020-03-05 PROCEDURE — 71045 X-RAY EXAM CHEST 1 VIEW: CPT

## 2020-03-05 PROCEDURE — 80053 COMPREHEN METABOLIC PANEL: CPT | Performed by: INTERNAL MEDICINE

## 2020-03-05 PROCEDURE — 63710000001 INSULIN ASPART PER 5 UNITS: Performed by: INTERNAL MEDICINE

## 2020-03-05 PROCEDURE — G0379 DIRECT REFER HOSPITAL OBSERV: HCPCS

## 2020-03-05 PROCEDURE — 85025 COMPLETE CBC W/AUTO DIFF WBC: CPT | Performed by: INTERNAL MEDICINE

## 2020-03-05 RX ORDER — SODIUM CHLORIDE 0.9 % (FLUSH) 0.9 %
10 SYRINGE (ML) INJECTION EVERY 12 HOURS SCHEDULED
Status: DISCONTINUED | OUTPATIENT
Start: 2020-03-05 | End: 2020-03-06 | Stop reason: HOSPADM

## 2020-03-05 RX ORDER — NICOTINE POLACRILEX 4 MG
15 LOZENGE BUCCAL
Status: DISCONTINUED | OUTPATIENT
Start: 2020-03-05 | End: 2020-03-06 | Stop reason: HOSPADM

## 2020-03-05 RX ORDER — DEXTROSE MONOHYDRATE 25 G/50ML
25 INJECTION, SOLUTION INTRAVENOUS
Status: DISCONTINUED | OUTPATIENT
Start: 2020-03-05 | End: 2020-03-06 | Stop reason: HOSPADM

## 2020-03-05 RX ORDER — MAGNESIUM SULFATE HEPTAHYDRATE 40 MG/ML
4 INJECTION, SOLUTION INTRAVENOUS AS NEEDED
Status: DISCONTINUED | OUTPATIENT
Start: 2020-03-05 | End: 2020-03-06 | Stop reason: HOSPADM

## 2020-03-05 RX ORDER — MAGNESIUM SULFATE HEPTAHYDRATE 40 MG/ML
2 INJECTION, SOLUTION INTRAVENOUS AS NEEDED
Status: DISCONTINUED | OUTPATIENT
Start: 2020-03-05 | End: 2020-03-06 | Stop reason: HOSPADM

## 2020-03-05 RX ORDER — SODIUM CHLORIDE 9 MG/ML
75 INJECTION, SOLUTION INTRAVENOUS CONTINUOUS
Status: DISCONTINUED | OUTPATIENT
Start: 2020-03-05 | End: 2020-03-06 | Stop reason: HOSPADM

## 2020-03-05 RX ORDER — ACETAMINOPHEN 325 MG/1
650 TABLET ORAL EVERY 4 HOURS PRN
Status: DISCONTINUED | OUTPATIENT
Start: 2020-03-05 | End: 2020-03-06 | Stop reason: HOSPADM

## 2020-03-05 RX ORDER — ACETAMINOPHEN 160 MG/5ML
650 SOLUTION ORAL EVERY 4 HOURS PRN
Status: DISCONTINUED | OUTPATIENT
Start: 2020-03-05 | End: 2020-03-05 | Stop reason: SDUPTHER

## 2020-03-05 RX ORDER — ONDANSETRON 2 MG/ML
4 INJECTION INTRAMUSCULAR; INTRAVENOUS EVERY 6 HOURS PRN
Status: DISCONTINUED | OUTPATIENT
Start: 2020-03-05 | End: 2020-03-06 | Stop reason: HOSPADM

## 2020-03-05 RX ORDER — MORPHINE SULFATE 2 MG/ML
1 INJECTION, SOLUTION INTRAMUSCULAR; INTRAVENOUS EVERY 4 HOURS PRN
Status: DISCONTINUED | OUTPATIENT
Start: 2020-03-05 | End: 2020-03-06 | Stop reason: HOSPADM

## 2020-03-05 RX ORDER — NALOXONE HCL 0.4 MG/ML
0.4 VIAL (ML) INJECTION
Status: DISCONTINUED | OUTPATIENT
Start: 2020-03-05 | End: 2020-03-06 | Stop reason: HOSPADM

## 2020-03-05 RX ORDER — ACETAMINOPHEN 650 MG/1
650 SUPPOSITORY RECTAL EVERY 4 HOURS PRN
Status: DISCONTINUED | OUTPATIENT
Start: 2020-03-05 | End: 2020-03-06 | Stop reason: HOSPADM

## 2020-03-05 RX ORDER — POTASSIUM CHLORIDE 1.5 G/1.77G
40 POWDER, FOR SOLUTION ORAL AS NEEDED
Status: DISCONTINUED | OUTPATIENT
Start: 2020-03-05 | End: 2020-03-06 | Stop reason: HOSPADM

## 2020-03-05 RX ORDER — ATORVASTATIN CALCIUM 40 MG/1
40 TABLET, FILM COATED ORAL DAILY
Status: DISCONTINUED | OUTPATIENT
Start: 2020-03-06 | End: 2020-03-06 | Stop reason: HOSPADM

## 2020-03-05 RX ORDER — FAMOTIDINE 20 MG/1
40 TABLET, FILM COATED ORAL DAILY
Status: DISCONTINUED | OUTPATIENT
Start: 2020-03-06 | End: 2020-03-06 | Stop reason: HOSPADM

## 2020-03-05 RX ORDER — HEPARIN SODIUM 5000 [USP'U]/ML
5000 INJECTION, SOLUTION INTRAVENOUS; SUBCUTANEOUS EVERY 12 HOURS SCHEDULED
Status: DISCONTINUED | OUTPATIENT
Start: 2020-03-05 | End: 2020-03-06 | Stop reason: HOSPADM

## 2020-03-05 RX ORDER — ONDANSETRON 4 MG/1
4 TABLET, FILM COATED ORAL EVERY 6 HOURS PRN
Status: DISCONTINUED | OUTPATIENT
Start: 2020-03-05 | End: 2020-03-06 | Stop reason: HOSPADM

## 2020-03-05 RX ORDER — DOCUSATE SODIUM 100 MG/1
100 CAPSULE, LIQUID FILLED ORAL 2 TIMES DAILY
Status: DISCONTINUED | OUTPATIENT
Start: 2020-03-05 | End: 2020-03-06 | Stop reason: HOSPADM

## 2020-03-05 RX ORDER — POTASSIUM CHLORIDE 750 MG/1
40 CAPSULE, EXTENDED RELEASE ORAL AS NEEDED
Status: DISCONTINUED | OUTPATIENT
Start: 2020-03-05 | End: 2020-03-06 | Stop reason: HOSPADM

## 2020-03-05 RX ORDER — CARVEDILOL 25 MG/1
25 TABLET ORAL 2 TIMES DAILY WITH MEALS
Status: DISCONTINUED | OUTPATIENT
Start: 2020-03-05 | End: 2020-03-06 | Stop reason: HOSPADM

## 2020-03-05 RX ORDER — GABAPENTIN 300 MG/1
300 CAPSULE ORAL EVERY 12 HOURS SCHEDULED
Status: DISCONTINUED | OUTPATIENT
Start: 2020-03-05 | End: 2020-03-06 | Stop reason: HOSPADM

## 2020-03-05 RX ORDER — POTASSIUM CHLORIDE 7.45 MG/ML
10 INJECTION INTRAVENOUS
Status: DISCONTINUED | OUTPATIENT
Start: 2020-03-05 | End: 2020-03-06 | Stop reason: HOSPADM

## 2020-03-05 RX ORDER — SODIUM CHLORIDE 0.9 % (FLUSH) 0.9 %
10 SYRINGE (ML) INJECTION AS NEEDED
Status: DISCONTINUED | OUTPATIENT
Start: 2020-03-05 | End: 2020-03-06 | Stop reason: HOSPADM

## 2020-03-05 RX ORDER — LANOLIN ALCOHOL/MO/W.PET/CERES
3 CREAM (GRAM) TOPICAL NIGHTLY PRN
Status: DISCONTINUED | OUTPATIENT
Start: 2020-03-05 | End: 2020-03-06 | Stop reason: HOSPADM

## 2020-03-05 RX ADMIN — CARVEDILOL 25 MG: 25 TABLET, FILM COATED ORAL at 21:14

## 2020-03-05 RX ADMIN — INSULIN ASPART 2 UNITS: 100 INJECTION, SOLUTION INTRAVENOUS; SUBCUTANEOUS at 21:13

## 2020-03-05 RX ADMIN — GABAPENTIN 300 MG: 300 CAPSULE ORAL at 21:14

## 2020-03-05 RX ADMIN — SODIUM CHLORIDE 75 ML/HR: 9 INJECTION, SOLUTION INTRAVENOUS at 21:00

## 2020-03-06 ENCOUNTER — APPOINTMENT (OUTPATIENT)
Dept: ULTRASOUND IMAGING | Facility: HOSPITAL | Age: 70
End: 2020-03-06

## 2020-03-06 VITALS
RESPIRATION RATE: 18 BRPM | DIASTOLIC BLOOD PRESSURE: 60 MMHG | SYSTOLIC BLOOD PRESSURE: 115 MMHG | WEIGHT: 231.7 LBS | HEART RATE: 75 BPM | HEIGHT: 64 IN | BODY MASS INDEX: 39.56 KG/M2 | TEMPERATURE: 97.4 F | OXYGEN SATURATION: 97 %

## 2020-03-06 DIAGNOSIS — R55 SYNCOPE AND COLLAPSE: Primary | ICD-10-CM

## 2020-03-06 LAB
ANION GAP SERPL CALCULATED.3IONS-SCNC: 10 MMOL/L (ref 5–15)
BASOPHILS # BLD AUTO: 0.02 10*3/MM3 (ref 0–0.2)
BASOPHILS NFR BLD AUTO: 0.3 % (ref 0–1.5)
BUN BLD-MCNC: 11 MG/DL (ref 8–23)
BUN/CREAT SERPL: 11 (ref 7–25)
CALCIUM SPEC-SCNC: 9.3 MG/DL (ref 8.6–10.5)
CHLORIDE SERPL-SCNC: 105 MMOL/L (ref 98–107)
CO2 SERPL-SCNC: 25 MMOL/L (ref 22–29)
CREAT BLD-MCNC: 1 MG/DL (ref 0.57–1)
DEPRECATED RDW RBC AUTO: 44.6 FL (ref 37–54)
EOSINOPHIL # BLD AUTO: 0.29 10*3/MM3 (ref 0–0.4)
EOSINOPHIL NFR BLD AUTO: 4.2 % (ref 0.3–6.2)
ERYTHROCYTE [DISTWIDTH] IN BLOOD BY AUTOMATED COUNT: 13.2 % (ref 12.3–15.4)
GFR SERPL CREATININE-BSD FRML MDRD: 66 ML/MIN/1.73
GLUCOSE BLD-MCNC: 118 MG/DL (ref 65–99)
GLUCOSE BLDC GLUCOMTR-MCNC: 145 MG/DL (ref 70–130)
GLUCOSE BLDC GLUCOMTR-MCNC: 163 MG/DL (ref 70–130)
HCT VFR BLD AUTO: 30.1 % (ref 34–46.6)
HGB BLD-MCNC: 9.6 G/DL (ref 12–15.9)
IMM GRANULOCYTES # BLD AUTO: 0.02 10*3/MM3 (ref 0–0.05)
IMM GRANULOCYTES NFR BLD AUTO: 0.3 % (ref 0–0.5)
LYMPHOCYTES # BLD AUTO: 2.28 10*3/MM3 (ref 0.7–3.1)
LYMPHOCYTES NFR BLD AUTO: 33.2 % (ref 19.6–45.3)
MAGNESIUM SERPL-MCNC: 2.3 MG/DL (ref 1.6–2.4)
MAGNESIUM SERPL-MCNC: 2.7 MG/DL (ref 1.6–2.4)
MCH RBC QN AUTO: 29.5 PG (ref 26.6–33)
MCHC RBC AUTO-ENTMCNC: 31.9 G/DL (ref 31.5–35.7)
MCV RBC AUTO: 92.6 FL (ref 79–97)
MONOCYTES # BLD AUTO: 0.55 10*3/MM3 (ref 0.1–0.9)
MONOCYTES NFR BLD AUTO: 8 % (ref 5–12)
NEUTROPHILS # BLD AUTO: 3.7 10*3/MM3 (ref 1.7–7)
NEUTROPHILS NFR BLD AUTO: 54 % (ref 42.7–76)
NRBC BLD AUTO-RTO: 0 /100 WBC (ref 0–0.2)
NT-PROBNP SERPL-MCNC: 746.5 PG/ML (ref 5–900)
PHOSPHATE SERPL-MCNC: 3.2 MG/DL (ref 2.5–4.5)
PLATELET # BLD AUTO: 253 10*3/MM3 (ref 140–450)
PMV BLD AUTO: 11.6 FL (ref 6–12)
POTASSIUM BLD-SCNC: 3.7 MMOL/L (ref 3.5–5.2)
POTASSIUM BLD-SCNC: 4.5 MMOL/L (ref 3.5–5.2)
RBC # BLD AUTO: 3.25 10*6/MM3 (ref 3.77–5.28)
SODIUM BLD-SCNC: 140 MMOL/L (ref 136–145)
TROPONIN T SERPL-MCNC: <0.01 NG/ML (ref 0–0.03)
TROPONIN T SERPL-MCNC: <0.01 NG/ML (ref 0–0.03)
WBC NRBC COR # BLD: 6.86 10*3/MM3 (ref 3.4–10.8)

## 2020-03-06 PROCEDURE — 93010 ELECTROCARDIOGRAM REPORT: CPT | Performed by: INTERNAL MEDICINE

## 2020-03-06 PROCEDURE — 83735 ASSAY OF MAGNESIUM: CPT | Performed by: INTERNAL MEDICINE

## 2020-03-06 PROCEDURE — 85025 COMPLETE CBC W/AUTO DIFF WBC: CPT | Performed by: INTERNAL MEDICINE

## 2020-03-06 PROCEDURE — 84484 ASSAY OF TROPONIN QUANT: CPT | Performed by: INTERNAL MEDICINE

## 2020-03-06 PROCEDURE — 25010000003 MAGNESIUM SULFATE 4 GM/100ML SOLUTION: Performed by: INTERNAL MEDICINE

## 2020-03-06 PROCEDURE — 83880 ASSAY OF NATRIURETIC PEPTIDE: CPT | Performed by: INTERNAL MEDICINE

## 2020-03-06 PROCEDURE — 93005 ELECTROCARDIOGRAM TRACING: CPT | Performed by: NURSE PRACTITIONER

## 2020-03-06 PROCEDURE — 84132 ASSAY OF SERUM POTASSIUM: CPT | Performed by: FAMILY MEDICINE

## 2020-03-06 PROCEDURE — G0378 HOSPITAL OBSERVATION PER HR: HCPCS

## 2020-03-06 PROCEDURE — 80048 BASIC METABOLIC PNL TOTAL CA: CPT | Performed by: INTERNAL MEDICINE

## 2020-03-06 PROCEDURE — 93005 ELECTROCARDIOGRAM TRACING: CPT | Performed by: INTERNAL MEDICINE

## 2020-03-06 PROCEDURE — 82962 GLUCOSE BLOOD TEST: CPT

## 2020-03-06 PROCEDURE — 83735 ASSAY OF MAGNESIUM: CPT | Performed by: FAMILY MEDICINE

## 2020-03-06 PROCEDURE — 99214 OFFICE O/P EST MOD 30 MIN: CPT | Performed by: NURSE PRACTITIONER

## 2020-03-06 PROCEDURE — 93880 EXTRACRANIAL BILAT STUDY: CPT

## 2020-03-06 PROCEDURE — 84100 ASSAY OF PHOSPHORUS: CPT | Performed by: INTERNAL MEDICINE

## 2020-03-06 RX ORDER — LOSARTAN POTASSIUM 25 MG/1
12.5 TABLET ORAL DAILY
Qty: 30 TABLET | Refills: 0 | Status: SHIPPED | OUTPATIENT
Start: 2020-03-06 | End: 2021-02-26

## 2020-03-06 RX ADMIN — GABAPENTIN 300 MG: 300 CAPSULE ORAL at 08:45

## 2020-03-06 RX ADMIN — MAGNESIUM SULFATE HEPTAHYDRATE 4 G: 40 INJECTION, SOLUTION INTRAVENOUS at 00:35

## 2020-03-06 RX ADMIN — FAMOTIDINE 40 MG: 20 TABLET ORAL at 08:45

## 2020-03-06 RX ADMIN — POTASSIUM CHLORIDE 40 MEQ: 750 CAPSULE, EXTENDED RELEASE ORAL at 04:05

## 2020-03-06 RX ADMIN — ATORVASTATIN CALCIUM 40 MG: 40 TABLET, FILM COATED ORAL at 08:45

## 2020-03-06 RX ADMIN — POTASSIUM CHLORIDE 40 MEQ: 750 CAPSULE, EXTENDED RELEASE ORAL at 00:35

## 2020-03-06 RX ADMIN — CARVEDILOL 25 MG: 25 TABLET, FILM COATED ORAL at 08:45

## 2020-03-06 NOTE — PROGRESS NOTES
[facility] Medicine Services  INPATIENT PROGRESS NOTE    Length of Stay: 0  Date of Admission: 3/5/2020  Primary Care Physician: Provider, No Known    Subjective   Chief Complaint/HPI: This 69-year-old -American female was transferred to our facility from [facility] inpatient.  Patient presented to the facility 3 days ago with a history of syncope.  Patient underwent numerous evaluations including a neurological evaluation and all findings were found to be negative.  Per the transferring hospitalist, neurology concluded patient most likely had syncope secondary to cardiac versus medication versus vasovagal origin.  Patient, who also has a history of diabetic neuropathy, is established with Dr. Saravia of cardiology.  She has been transferred here for further evaluation.    Patient denies any syncope, chest pain, shortness of air, dizziness, or any other complaints.  She is currently resting comfortably.    Review of Systems   Constitutional: Negative for chills and fever.   Respiratory: Negative for shortness of breath.    Cardiovascular: Negative for chest pain and palpitations.   Gastrointestinal: Negative for abdominal pain, nausea and vomiting.   Genitourinary: Negative for dysuria.   Neurological: Negative for dizziness, syncope and light-headedness.   Psychiatric/Behavioral: Negative for confusion.      All pertinent negatives and positives are as above. All other systems have been reviewed and are negative unless otherwise stated.     Objective    Temp:  [96.3 °F (35.7 °C)-97.6 °F (36.4 °C)] 97.6 °F (36.4 °C)  Heart Rate:  [73-85] 82  Resp:  [18] 18  BP: (121-164)/(75-90) 164/90    Physical Exam   Constitutional: She is oriented to person, place, and time. She appears well-developed and well-nourished.   HENT:   Head: Normocephalic and atraumatic.   Eyes: Pupils are equal, round, and reactive to light. Conjunctivae and EOM are normal.    Cardiovascular: Normal rate and regular rhythm.   Pulmonary/Chest: Effort normal and breath sounds normal. No stridor. No respiratory distress.   Abdominal: Soft. Bowel sounds are normal. She exhibits no distension. There is no tenderness.   Neurological: She is alert and oriented to person, place, and time. No cranial nerve deficit.   Skin: Skin is warm and dry. Capillary refill takes less than 2 seconds.   Psychiatric: She has a normal mood and affect. Her behavior is normal.     Results Review:  I have reviewed the labs, radiology results, and diagnostic studies.    Laboratory Data:   Results from last 7 days   Lab Units 03/06/20  0826 03/06/20 0138 03/05/20  2104   SODIUM mmol/L  --  140 142   POTASSIUM mmol/L 4.5 3.7 3.6   CHLORIDE mmol/L  --  105 105   CO2 mmol/L  --  25.0 25.0   BUN mg/dL  --  11 12   CREATININE mg/dL  --  1.00 1.04*   GLUCOSE mg/dL  --  118* 144*   CALCIUM mg/dL  --  9.3 9.6   BILIRUBIN mg/dL  --   --  0.3   ALK PHOS U/L  --   --  75   ALT (SGPT) U/L  --   --  18   AST (SGOT) U/L  --   --  15   ANION GAP mmol/L  --  10.0 12.0     Estimated Creatinine Clearance: 61.8 mL/min (by C-G formula based on SCr of 1 mg/dL).  Results from last 7 days   Lab Units 03/06/20  0826 03/06/20 0138 03/05/20  2104   MAGNESIUM mg/dL 2.7* 2.3 1.9   PHOSPHORUS mg/dL  --  3.2 2.8         Results from last 7 days   Lab Units 03/06/20 0138 03/05/20  2104   WBC 10*3/mm3 6.86 7.85   HEMOGLOBIN g/dL 9.6* 9.8*   HEMATOCRIT % 30.1* 30.1*   PLATELETS 10*3/mm3 253 258           Culture Data:   No results found for: BLOODCX  No results found for: URINECX  No results found for: RESPCX  No results found for: WOUNDCX  No results found for: STOOLCX  No components found for: BODYFLD    Radiology Data:   Imaging Results (Last 24 Hours)     Procedure Component Value Units Date/Time    US Carotid Bilateral [[id number]] Collected:  03/06/20 0908     Updated:  03/06/20 1040    Narrative:       Procedure: Bilateral carotid duplex  ultrasound    Reason for exam: Syncope. Irregular heartbeat.    FINDINGS: The right common carotid artery appears widely patent  with peak systolic velocity of 84 cm/s. The right vertebral  artery appears patent with normal antegrade flow. The right  internal carotid artery appears widely patent and has a tortuous  course. The peak systolic velocity of the right internal carotid  artery is seen distally which is 101 cm/s. The right external  carotid artery appears patent with peak systolic velocity is 79  cm/s. The right ICA/CCA ratio is 1.2. The left common carotid  artery appears patent with peak systolic velocity of 100 cm/s.  The left vertebral artery appears patent with normal antegrade  flow. The left internal carotid artery appears patent with peak  systolic velocity of 74 cm/s. The left external carotid artery  appears patent with peak systolic velocity 106 cm/s. The left  ICA/CCA ratio is 0.74.      Impression:       1.  Normal bilateral carotid duplex ultrasound with no  significant hemodynamic focus identified.    Electronically signed by:  Ankit Hall MD  3/6/2020 10:38 AM CST  Workstation: [id number]    XR Chest 1 View [[id number]] Collected:  03/05/20 2046     Updated:  03/05/20 2100    Narrative:       PROCEDURE: XR CHEST 1 VIEW    Clinical History: syncope    Indication:     Same as above.    Comparison:     None.    Technique:     Single portable frontal projection of the chest was done.    Findings:    The lung fields are well inflated.    There are no discrete airspace infiltrates, pneumothoraces or  pleural effusions.    The pulmonary vascularity is normal.    The cardiomediastinal contour is  unremarkable.      Impression:       Impression:     There is no acute pleural-parenchymal process seen in the imaged  lung fields.    Electronically signed by:  Ryne Rider MD  3/5/2020 8:59 PM CST  Workstation: [id number]          I have reviewed the patient's current medications.     Assessment/Plan      Active Hospital Problems    Diagnosis   • Syncope   Pulmonary hypertension  Aortic valve insufficiency  Chest pain syndrome      Plan: Awaiting cardiology consult.  Attending physician to contact Dr. Saravia.                This document has been electronically signed by LOLLY Perez on March 6, 2020 11:19 AM

## 2020-03-06 NOTE — H&P
[facility] Medicine Admission    Date of Admission: 3/5/2020    Primary Care Physician: Provider, No Known    Chief Complaint: Fainting episode    HPI:The patient is a 69-year-old black woman who was transferred to this facility from [facility].  She had presented at the facility on Tuesday which was 2 days ago with history of syncope.  After she was extensively evaluated, she was subsequently transferred to this facility for further evaluation by the cardiologist.   While she was at [facility], the patient was seen by a neurologist for further evaluation of her symptoms, because she was suspected of having a neurological cause of her symptoms.  After adequate evaluation, the neurologist concluded that the patient most likely had syncope which may have been due to a cardiac cause or medication effect or vasovagal in origin.  She was also diagnosed with diabetic neuropathy which was not really symptomatic at the time of evaluation.     The patient reveals that she had only one episode of syncope, and she feels well as of the time that I saw her.  Although she had an underlying valvular heart disease, and she has been seeing a cardiologist in this facility.    The patient was seen at the bedside on arrival and evaluated.  Accompanying documents from the referring facility were reviewed.    She has no fever, chills, nausea, vomiting, diaphoresis, palpitations, double vision, blurred vision, or urinary symptoms.    Past Medical History:  has a past medical history of Carpal tunnel syndrome, Diabetes mellitus (CMS/[facility]), Diabetic neuropathy (CMS/[facility]), Female stress incontinence, History of colonoscopy (2007), History of mammogram (05/2011), Hyperlipidemia, Hypertension, Murmur, cardiac, Sciatica of left side, and Vitamin D deficiency.    Past Surgical History:  has a past surgical history that includes Carpal tunnel release (08/22/2007); Injection of Medication  (12/08/2015); Hysterectomy; and Injection of Medication (02/12/2015).    Family History: family history includes Diabetes in an other family member; Heart disease in an other family member; Hypertension in an other family member; Stroke in an other family member.    Social History:  reports that she has never smoked. She has never used smokeless tobacco. Drug use questions deferred to the physician. She reports that she does not drink alcohol.    Allergies:   Allergies   Allergen Reactions   • Atenolol Nausea And Vomiting   • Lortab [Hydrocodone-Acetaminophen] Mental Status Change       Medications:   Prior to Admission medications    Medication Sig Start Date End Date Taking? Authorizing Provider   acetaminophen (TYLENOL 8 HOUR) 650 MG 8 hr tablet Take 650 mg by mouth Every 8 (Eight) Hours As Needed for Mild Pain .   Yes Alis Valadez MD   amLODIPine (NORVASC) 10 MG tablet Take 10 mg by mouth As Needed.   Yes Alis Valadez MD   aspirin 81 MG EC tablet Take 81 mg by mouth Daily.   Yes Alis Valadez MD   atorvastatin (LIPITOR) 20 MG tablet 40 mg Daily. 11/28/17  Yes Alis Valadez MD   carvedilol (COREG) 6.25 MG tablet 25 mg 2 (Two) Times a Day. 11/29/17  Yes Alis Valadez MD   gabapentin (NEURONTIN) 600 MG tablet Take 300 mg by mouth 2 (Two) Times a Day. prn   Yes Alis Valadez MD   losartan (COZAAR) 100 MG tablet Daily. 11/10/17  Yes Alis Valadez MD   metFORMIN (GLUCOPHAGE) 500 MG tablet Take 500 mg by mouth 2 (Two) Times a Day With Meals.   Yes Alis Valadez MD   Prenatal Vit-Fe Fumarate-FA (PRENATAL, CLASSIC, VITAMIN) 28-0.8 MG tablet tablet Take  by mouth Daily.   Yes Alis Valadez MD       Review of Systems:  Review of Systems   Otherwise complete ROS is negative except as mentioned above.    Physical Exam:   Temp:  [96.3 °F (35.7 °C)-97.3 °F (36.3 °C)] 96.6 °F (35.9 °C)  Heart Rate:  [73-85] 85  Resp:  [18] 18  BP: (121-150)/(75-84)  121/75  Physical exam:  Constitutional:  Well-developed and well-nourished.  Obese.  No respiratory distress.      HENT:  Head:  Normocephalic and atraumatic.  Mouth:  Moist mucous membranes.    Eyes:  Conjunctivae and EOM are normal.  Pupils are equal, round, and reactive to light.  No scleral icterus.    Neck:  Neck supple.  No JVD present.    Cardiovascular:  Normal rate, regular rhythm and normal heart sounds with no murmur.  Pulmonary/Chest:  No respiratory distress, no wheezes, no crackles, with normal breath sounds and good air movement.  Abdominal:  Soft.  Bowel sounds are normal.  No distension and no tenderness.   Musculoskeletal:  No edema, no tenderness, and no deformity.  No red or swollen joints anywhere.    Neurological:  Alert and oriented to person, place, and time.  No cranial nerve deficit.  No tongue deviation.  No facial droop.  No slurred speech.   Skin:  Skin is warm and dry. No rash noted. No pallor.   Peripheral vascular: No clubbing, no cyanosis, no edema.    Results Reviewed:  I have personally reviewed current lab, radiology, and data and agree with results.  Lab Results (last 24 hours)     Procedure Component Value Units Date/Time    Troponin [[id number]]  (Normal) Collected:  03/05/20 2104    Specimen:  Blood Updated:  03/05/20 2145     Troponin T <0.010 ng/mL     Narrative:       Troponin T Reference Range:  <= 0.03 ng/mL-   Negative for AMI  >0.03 ng/mL-     Abnormal for myocardial necrosis.  Clinicians would have to utilize clinical acumen, EKG, Troponin and serial changes to determine if it is an Acute Myocardial Infarction or myocardial injury due to an underlying chronic condition.       Results may be falsely decreased if patient taking Biotin.      TSH [[id number]]  (Normal) Collected:  03/05/20 2104    Specimen:  Blood Updated:  03/05/20 2145     TSH 2.080 uIU/mL     Comprehensive Metabolic Panel [[id number]]  (Abnormal) Collected:  03/05/20 2104    Specimen:  Blood Updated:   03/05/20 2139     Glucose 144 mg/dL      BUN 12 mg/dL      Creatinine 1.04 mg/dL      Sodium 142 mmol/L      Potassium 3.6 mmol/L      Chloride 105 mmol/L      CO2 25.0 mmol/L      Calcium 9.6 mg/dL      Total Protein 7.0 g/dL      Albumin 4.10 g/dL      ALT (SGPT) 18 U/L      AST (SGOT) 15 U/L      Alkaline Phosphatase 75 U/L      Total Bilirubin 0.3 mg/dL      eGFR  African Amer 63 mL/min/1.73      Globulin 2.9 gm/dL      A/G Ratio 1.4 g/dL      BUN/Creatinine Ratio 11.5     Anion Gap 12.0 mmol/L     Narrative:       GFR Normal >60  Chronic Kidney Disease <60  Kidney Failure <15      Phosphorus [[id number]]  (Normal) Collected:  03/05/20 2104    Specimen:  Blood Updated:  03/05/20 2139     Phosphorus 2.8 mg/dL     Magnesium [[id number]]  (Normal) Collected:  03/05/20 2104    Specimen:  Blood Updated:  03/05/20 2139     Magnesium 1.9 mg/dL     POC Glucose Once [[id number]]  (Abnormal) Collected:  03/05/20 2109    Specimen:  Blood Updated:  03/05/20 2122     Glucose 150 mg/dL      Comment: RN NotifiedOperator: [id number] GIOVANI Brown ID: [id number]       CBC Auto Differential [[id number]]  (Abnormal) Collected:  03/05/20 2104    Specimen:  Blood Updated:  03/05/20 2119     WBC 7.85 10*3/mm3      RBC 3.26 10*6/mm3      Hemoglobin 9.8 g/dL      Hematocrit 30.1 %      MCV 92.3 fL      MCH 30.1 pg      MCHC 32.6 g/dL      RDW 13.2 %      RDW-SD 44.1 fl      MPV 11.5 fL      Platelets 258 10*3/mm3      Neutrophil % 57.7 %      Lymphocyte % 29.9 %      Monocyte % 7.5 %      Eosinophil % 4.2 %      Basophil % 0.3 %      Immature Grans % 0.4 %      Neutrophils, Absolute 4.53 10*3/mm3      Lymphocytes, Absolute 2.35 10*3/mm3      Monocytes, Absolute 0.59 10*3/mm3      Eosinophils, Absolute 0.33 10*3/mm3      Basophils, Absolute 0.02 10*3/mm3      Immature Grans, Absolute 0.03 10*3/mm3      nRBC 0.0 /100 WBC         Imaging Results (Last 24 Hours)     Procedure Component Value Units Date/Time    XR Chest 1 View  [[id number]] Collected:  03/05/20 2046     Updated:  03/05/20 2100    Narrative:       PROCEDURE: XR CHEST 1 VIEW    Clinical History: syncope    Indication:     Same as above.    Comparison:     None.    Technique:     Single portable frontal projection of the chest was done.    Findings:    The lung fields are well inflated.    There are no discrete airspace infiltrates, pneumothoraces or  pleural effusions.    The pulmonary vascularity is normal.    The cardiomediastinal contour is  unremarkable.      Impression:       Impression:     There is no acute pleural-parenchymal process seen in the imaged  lung fields.    Electronically signed by:  Ryne Rider MD  3/5/2020 8:59 PM CST  Workstation: [email or web address]        Assessment:    Active Hospital Problems    Diagnosis   • Syncope       Plan:  Admit the patient to telemetry  Consult the cardiologist  Fall precautions  The patient's 2D echo done in January was reviewed.  Obtain carotid Doppler  Review and reconcile home medications  EKG still pending as of the time of this dictation    I discussed the patients findings and my recommendations with the patient.     Francisco Guzman MD  03/06/20  1:06 AM

## 2020-03-06 NOTE — CONSULTS
"    [facility] [facility] [place]    Referring Provider: Hiram Hardin MD    Reason for Consultation: syncope    Patient Care Team:  Provider, No Known as PCP - General    Chief complaint \"fainting\"      Subjective .     History of present illness:     Bacilio Shaffer is a 69-year-old  female who was transferred to this facility from [facility].  She had presented at the facility on Tuesday which was 2 days ago with history of syncope.  After she was extensively evaluated, she was subsequently transferred to this facility for further evaluation by the cardiologist.   While she was at [facility], the patient was seen by a neurologist for further evaluation of her symptoms, because she was suspected of having a neurological cause of her symptoms.  After adequate evaluation, the neurologist concluded that the patient most likely had syncope which may have been due to a cardiac cause or medication effect or vasovagal in origin.     Patient reports one episode of syncope and denies reoccurrence.She reports this happened while at work and while walking.  She denies current chest pain, shortness of breath occasional with exertion , fever, chills, palpitations, edema or orthopnea.   Regarding cardiology, she follows outpatient with Dr. Saravia. She now has mild pulmonary hypertension with a PA pressure in the low 40s.  She continues to have mild aortic regurgitation and tricuspid regurgitation.  However, she likely has a more moderate degree of mitral regurgitation.  LV diameters were normal. MPS performed 01/2020 shows normal study with no evidence of ischemia, low risk study. Stress echo showed severe LVH, LVF low normal 51%, No evidence of ischemia.    U/S carotid- normal. Troponin x3 negative, proBNP-wnl. H/h- showed anemia. CXR- no acute cardiopulmonary process. EKG this morning showed NSR, frequent PACS questionable a-fib- no acute st changes. Unfortunately. Pt was not placed on tele- to review. Repeat " EKG showed same as previous.      Risk Factors include; HTN, HLD, DMII    The CVD Risk score (D'Agostino, et al., 2008) failed to calculate for the following reasons:    The patient has a prior MI, stroke, CHF, or peripheral vascular disease diagnosis      Review of Systems  Review of Systems   Constitutional: Negative for activity change, chills, fatigue, fever and unexpected weight change.   HENT: Negative for hearing loss, nosebleeds, tinnitus, trouble swallowing and voice change.    Eyes: Negative for visual disturbance.   Respiratory: Positive for shortness of breath. Negative for apnea, cough, chest tightness and wheezing.    Cardiovascular: Negative for chest pain, palpitations and leg swelling.   Gastrointestinal: Negative for abdominal distention, abdominal pain and blood in stool.   Endocrine: Negative for cold intolerance, heat intolerance, polydipsia, polyphagia and polyuria.   Genitourinary: Negative.    Musculoskeletal: Negative for arthralgias and back pain.   Skin: Negative.    Allergic/Immunologic: Negative.    Neurological: Positive for syncope. Negative for seizures, speech difficulty, numbness and headaches.   Hematological: Negative for adenopathy. Does not bruise/bleed easily.   Psychiatric/Behavioral: Negative for agitation, behavioral problems and suicidal ideas. The patient is not nervous/anxious.        History  Past Medical History:   Diagnosis Date   • Carpal tunnel syndrome    • Diabetes mellitus (CMS/HCC)    • Diabetic neuropathy (CMS/HCC)    • Female stress incontinence    • History of colonoscopy 2007   • History of mammogram 05/2011   • Hyperlipidemia    • Hypertension    • Murmur, cardiac    • Sciatica of left side    • Vitamin D deficiency    ,   Past Surgical History:   Procedure Laterality Date   • CARPAL TUNNEL RELEASE  08/22/2007   • HYSTERECTOMY     • INJECTION OF MEDICATION  12/08/2015    kenalog 6   • INJECTION OF MEDICATION  02/12/2015    toradol 3   ,   Family History  "  Problem Relation Age of Onset   • Diabetes Other    • Heart disease Other    • Hypertension Other    • Stroke Other    ,   Social History     Tobacco Use   • Smoking status: Never Smoker   • Smokeless tobacco: Never Used   Substance Use Topics   • Alcohol use: No   • Drug use: Defer   ,   Medications Prior to Admission   Medication Sig Dispense Refill Last Dose   • acetaminophen (TYLENOL 8 HOUR) 650 MG 8 hr tablet Take 650 mg by mouth Every 8 (Eight) Hours As Needed for Mild Pain .   Past Month at Unknown time   • amLODIPine (NORVASC) 10 MG tablet Take 10 mg by mouth As Needed.   Past Month at Unknown time   • aspirin 81 MG EC tablet Take 81 mg by mouth Daily.   3/5/2020 at Unknown time   • atorvastatin (LIPITOR) 20 MG tablet 40 mg Daily.   3/5/2020 at Unknown time   • carvedilol (COREG) 6.25 MG tablet 25 mg 2 (Two) Times a Day.   3/5/2020 at Unknown time   • gabapentin (NEURONTIN) 600 MG tablet Take 300 mg by mouth 2 (Two) Times a Day. prn   3/5/2020 at Unknown time   • losartan (COZAAR) 100 MG tablet Daily.   3/5/2020 at Unknown time   • metFORMIN (GLUCOPHAGE) 500 MG tablet Take 500 mg by mouth 2 (Two) Times a Day With Meals.   3/5/2020 at Unknown time   • Prenatal Vit-Fe Fumarate-FA (PRENATAL, CLASSIC, VITAMIN) 28-0.8 MG tablet tablet Take  by mouth Daily.   3/5/2020 at Unknown time      ALLERGIES  Atenolol and Lortab [hydrocodone-acetaminophen]    Objective     Vital Sign Min/Max for last 24 hours  Temp  Min: 96.3 °F (35.7 °C)  Max: 97.6 °F (36.4 °C)   BP  Min: 121/75  Max: 164/90   Pulse  Min: 73  Max: 85   Resp  Min: 18  Max: 18   SpO2  Min: 98 %  Max: 99 %   No data recorded   Weight  Min: 105 kg (231 lb 11.3 oz)  Max: 106 kg (234 lb)     Flowsheet Rows      First Filed Value   Admission Height  162.6 cm (64\") Documented at 03/05/2020 1939   Admission Weight  106 kg (234 lb) Documented at 03/05/2020 1939             Physical Exam:  Physical Exam   Constitutional: She is oriented to person, place, and time. " Vital signs are normal. She appears well-developed and well-nourished. No distress.   HENT:   Head: Normocephalic.   Right Ear: External ear normal.   Left Ear: External ear normal.   Eyes: Pupils are equal, round, and reactive to light. EOM and lids are normal.   Neck: No JVD present. Carotid bruit is not present. No tracheal deviation present. No thyromegaly present.   Cardiovascular: Normal rate, regular rhythm, normal heart sounds and intact distal pulses. Exam reveals no gallop and no friction rub.   No murmur heard.  Pulmonary/Chest: Effort normal and breath sounds normal. No stridor. No respiratory distress. She has no wheezes. She has no rales. She exhibits no tenderness.   Abdominal: Soft. Normal appearance and bowel sounds are normal. She exhibits no distension. There is no tenderness.   Musculoskeletal: She exhibits no edema.     Vascular Status -  Her right foot exhibits normal foot vasculature  and no edema. Her left foot exhibits normal foot vasculature  and no edema.  Neurological: She is alert and oriented to person, place, and time. She has normal reflexes. She displays normal reflexes. No cranial nerve deficit.   Skin: Skin is warm and dry. Capillary refill takes 2 to 3 seconds. No rash noted. She is not diaphoretic. No erythema.   Psychiatric: She has a normal mood and affect. Her behavior is normal. Judgment and thought content normal.   Nursing note and vitals reviewed.         Results Review:     Results from last 7 days   Lab Units 03/06/20  0826 03/06/20  0138 03/05/20  2104   SODIUM mmol/L  --  140 142   POTASSIUM mmol/L 4.5 3.7 3.6   CHLORIDE mmol/L  --  105 105   CO2 mmol/L  --  25.0 25.0   BUN mg/dL  --  11 12   CREATININE mg/dL  --  1.00 1.04*   CALCIUM mg/dL  --  9.3 9.6   BILIRUBIN mg/dL  --   --  0.3   ALK PHOS U/L  --   --  75   ALT (SGPT) U/L  --   --  18   AST (SGOT) U/L  --   --  15   GLUCOSE mg/dL  --  118* 144*       Estimated Creatinine Clearance: 61.8 mL/min (by C-G formula  based on SCr of 1 mg/dL).    Results from last 7 days   Lab Units 03/06/20  0826 03/06/20  0138 03/05/20  2104   MAGNESIUM mg/dL 2.7* 2.3 1.9   PHOSPHORUS mg/dL  --  3.2 2.8       Results from last 7 days   Lab Units 03/06/20  0138 03/05/20  2104   WBC 10*3/mm3 6.86 7.85   HEMOGLOBIN g/dL 9.6* 9.8*   PLATELETS 10*3/mm3 253 258             Lab Results   Component Value Date    TROPONINT <0.010 03/06/2020     Lab Results   Component Value Date    PROBNP 746.5 03/06/2020       I/O last 3 completed shifts:  In: -   Out: 400 [Urine:400]    Cardiographics  ECG/EMG Results (last 24 hours)     Procedure Component Value Units Date/Time    ECG 12 Lead [[id number]] Collected:  03/06/20 0734     Updated:  03/06/20 0732    Narrative:       Test Reason : syncope  Blood Pressure : **/** mmHG  Vent. Rate : 075 BPM     Atrial Rate : 141 BPM     P-R Int : 000 ms          QRS Dur : 078 ms      QT Int : 408 ms       P-R-T Axes : 000 024 033 degrees     QTc Int : 455 ms    Atrial fibrillation  Abnormal ECG  When compared with ECG of 10-SOMMER-2020 10:19,  Atrial fibrillation has replaced Sinus rhythm    Referred By:             Confirmed By:         Results for orders placed in visit on 01/10/20   Adult Stress Echo W/ Cont or Stress Agent if Necessary Per Protocol    Narrative · Left ventricular wall thickness is consistent with severe concentric   hypertrophy.  · Left ventricular systolic function is low normal at 51-55%.  · Stress EKG with target heart rate achieved. No evidence of ischemia.  · Some echocardiographic stress imaging was sub-target and decreases the   sensitivity and specificity of the test.  · No evidence of inducible ischemia at sub-target (minimum, 73%) heart   rate. Sensitivity and specificity of the test is decreased in this   scenario.            Xr Chest 1 View    Result Date: 3/5/2020  Impression: There is no acute pleural-parenchymal process seen in the imaged lung fields. Electronically signed by:  Ryne Rider MD   3/5/2020 8:59 PM CST Workstation: [id number]    Us Carotid Bilateral    Result Date: 3/6/2020  1.  Normal bilateral carotid duplex ultrasound with no significant hemodynamic focus identified. Electronically signed by:  Ankit Hall MD  3/6/2020 10:38 AM CST Workstation: [id number]      Intake/Output       03/05/20 0700 - 03/06/20 0659    Intake (ml) --    Output (ml) 400    Net (ml) -400    Last Weight  105 kg (231 lb 11.3 oz)            Assessment/Plan       Syncope    Syncope, pre-syncopal episode, syncopal episode occurred during normal non-stressful ADLs: Probable vasovagal syncope and Loss of consciousness, suspect cardiac arrhythmia. There has not been recurrence. The patient has ambulated without symptoms. EKG showed NSR, pac's, no change from previous. Telemetry is Normal . Neurological exam is Normal.   Plan for Zio on discharge to assess for arrhthymias   Orthostatic v/s negative. Carotid duplex normal.   TTE and recent ischemic studies reviewed as discussed above.   Follow up Heather[place] 1 month for f/u and to discuss results.     I discussed the patients findings and my recommendations with patient, family, nursing staff and consulting provider and Dr. Saravia.      Plan: patient able to go home with outpatient Zio and f/u with cardiology.        This document has been electronically signed by BRIANNA Roberson on March 6, 2020 11:07 AM

## 2020-03-06 NOTE — DISCHARGE SUMMARY
[facility] Medicine Services  DISCHARGE SUMMARY       Date of Admission: 3/5/2020  Date of Discharge:  3/6/2020  Primary Care Physician: Provider, No Known    Presenting Problem/History of Present Illness:  Syncope [R55]     Final Discharge Diagnoses:  Syncope, resolved    Consults:   Consults     Date and Time Order Name Status Description    3/5/2020 2023 Inpatient Cardiology Consult Completed         Pertinent Test Results:   Lab Results (most recent)     Procedure Component Value Units Date/Time    POC Glucose Once [[id number]]  (Abnormal) Collected:  03/06/20 1051    Specimen:  Blood Updated:  03/06/20 1128     Glucose 163 mg/dL      Comment: RN NotifiedOperator: [id number] NARCISA Garces ID: [id number]       Troponin [[id number]]  (Normal) Collected:  03/06/20 0826    Specimen:  Blood Updated:  03/06/20 0859     Troponin T <0.010 ng/mL     Narrative:       Troponin T Reference Range:  <= 0.03 ng/mL-   Negative for AMI  >0.03 ng/mL-     Abnormal for myocardial necrosis.  Clinicians would have to utilize clinical acumen, EKG, Troponin and serial changes to determine if it is an Acute Myocardial Infarction or myocardial injury due to an underlying chronic condition.       Results may be falsely decreased if patient taking Biotin.      Magnesium [[id number]]  (Abnormal) Collected:  03/06/20 0826    Specimen:  Blood Updated:  03/06/20 0857     Magnesium 2.7 mg/dL     Potassium [[id number]]  (Normal) Collected:  03/06/20 0826    Specimen:  Blood Updated:  03/06/20 0857     Potassium 4.5 mmol/L     POC Glucose Once [[id number]]  (Abnormal) Collected:  03/06/20 0629    Specimen:  Blood Updated:  03/06/20 0653     Glucose 145 mg/dL      Comment: RN NotifiedOperator: [id number] MEHUL Carr ID: [id number]       Basic Metabolic Panel [[id number]]  (Abnormal) Collected:  03/06/20 0138    Specimen:  Blood Updated:  03/06/20 0231     Glucose 118 mg/dL      BUN 11 mg/dL       Creatinine 1.00 mg/dL      Sodium 140 mmol/L      Potassium 3.7 mmol/L      Chloride 105 mmol/L      CO2 25.0 mmol/L      Calcium 9.3 mg/dL      eGFR   Amer 66 mL/min/1.73      BUN/Creatinine Ratio 11.0     Anion Gap 10.0 mmol/L     Narrative:       GFR Normal >60  Chronic Kidney Disease <60  Kidney Failure <15      Magnesium [[id number]]  (Normal) Collected:  03/06/20 0138    Specimen:  Blood Updated:  03/06/20 0231     Magnesium 2.3 mg/dL     Phosphorus [[id number]]  (Normal) Collected:  03/06/20 0138    Specimen:  Blood Updated:  03/06/20 0231     Phosphorus 3.2 mg/dL     Troponin [[id number]]  (Normal) Collected:  03/06/20 0138    Specimen:  Blood Updated:  03/06/20 0228     Troponin T <0.010 ng/mL     Narrative:       Troponin T Reference Range:  <= 0.03 ng/mL-   Negative for AMI  >0.03 ng/mL-     Abnormal for myocardial necrosis.  Clinicians would have to utilize clinical acumen, EKG, Troponin and serial changes to determine if it is an Acute Myocardial Infarction or myocardial injury due to an underlying chronic condition.       Results may be falsely decreased if patient taking Biotin.      BNP [[id number]]  (Normal) Collected:  03/06/20 0138    Specimen:  Blood Updated:  03/06/20 0227     proBNP 746.5 pg/mL     Narrative:       Among patients with dyspnea, NT-proBNP is highly sensitive for the detection of acute congestive heart failure. In addition NT-proBNP of <300 pg/ml effectively rules out acute congestive heart failure with 99% negative predictive value.    Results may be falsely decreased if patient taking Biotin.      CBC Auto Differential [[id number]]  (Abnormal) Collected:  03/06/20 0138    Specimen:  Blood Updated:  03/06/20 0207     WBC 6.86 10*3/mm3      RBC 3.25 10*6/mm3      Hemoglobin 9.6 g/dL      Hematocrit 30.1 %      MCV 92.6 fL      MCH 29.5 pg      MCHC 31.9 g/dL      RDW 13.2 %      RDW-SD 44.6 fl      MPV 11.6 fL      Platelets 253 10*3/mm3      Neutrophil % 54.0 %      Lymphocyte  % 33.2 %      Monocyte % 8.0 %      Eosinophil % 4.2 %      Basophil % 0.3 %      Immature Grans % 0.3 %      Neutrophils, Absolute 3.70 10*3/mm3      Lymphocytes, Absolute 2.28 10*3/mm3      Monocytes, Absolute 0.55 10*3/mm3      Eosinophils, Absolute 0.29 10*3/mm3      Basophils, Absolute 0.02 10*3/mm3      Immature Grans, Absolute 0.02 10*3/mm3      nRBC 0.0 /100 WBC     TSH [[id number]]  (Normal) Collected:  03/05/20 2104    Specimen:  Blood Updated:  03/05/20 2145     TSH 2.080 uIU/mL     Comprehensive Metabolic Panel [[id number]]  (Abnormal) Collected:  03/05/20 2104    Specimen:  Blood Updated:  03/05/20 2139     Glucose 144 mg/dL      BUN 12 mg/dL      Creatinine 1.04 mg/dL      Sodium 142 mmol/L      Potassium 3.6 mmol/L      Chloride 105 mmol/L      CO2 25.0 mmol/L      Calcium 9.6 mg/dL      Total Protein 7.0 g/dL      Albumin 4.10 g/dL      ALT (SGPT) 18 U/L      AST (SGOT) 15 U/L      Alkaline Phosphatase 75 U/L      Total Bilirubin 0.3 mg/dL      eGFR  African Amer 63 mL/min/1.73      Globulin 2.9 gm/dL      A/G Ratio 1.4 g/dL      BUN/Creatinine Ratio 11.5     Anion Gap 12.0 mmol/L     Narrative:       GFR Normal >60  Chronic Kidney Disease <60  Kidney Failure <15      Phosphorus [[id number]]  (Normal) Collected:  03/05/20 2104    Specimen:  Blood Updated:  03/05/20 2139     Phosphorus 2.8 mg/dL     CBC Auto Differential [[id number]]  (Abnormal) Collected:  03/05/20 2104    Specimen:  Blood Updated:  03/05/20 2119     WBC 7.85 10*3/mm3      RBC 3.26 10*6/mm3      Hemoglobin 9.8 g/dL      Hematocrit 30.1 %      MCV 92.3 fL      MCH 30.1 pg      MCHC 32.6 g/dL      RDW 13.2 %      RDW-SD 44.1 fl      MPV 11.5 fL      Platelets 258 10*3/mm3      Neutrophil % 57.7 %      Lymphocyte % 29.9 %      Monocyte % 7.5 %      Eosinophil % 4.2 %      Basophil % 0.3 %      Immature Grans % 0.4 %      Neutrophils, Absolute 4.53 10*3/mm3      Lymphocytes, Absolute 2.35 10*3/mm3      Monocytes, Absolute 0.59 10*3/mm3       Eosinophils, Absolute 0.33 10*3/mm3      Basophils, Absolute 0.02 10*3/mm3      Immature Grans, Absolute 0.03 10*3/mm3      nRBC 0.0 /100 WBC         Imaging Results (Most Recent)     Procedure Component Value Units Date/Time    US Carotid Bilateral [[id number]] Collected:  03/06/20 0908     Updated:  03/06/20 1040    Narrative:       Procedure: Bilateral carotid duplex ultrasound    Reason for exam: Syncope. Irregular heartbeat.    FINDINGS: The right common carotid artery appears widely patent  with peak systolic velocity of 84 cm/s. The right vertebral  artery appears patent with normal antegrade flow. The right  internal carotid artery appears widely patent and has a tortuous  course. The peak systolic velocity of the right internal carotid  artery is seen distally which is 101 cm/s. The right external  carotid artery appears patent with peak systolic velocity is 79  cm/s. The right ICA/CCA ratio is 1.2. The left common carotid  artery appears patent with peak systolic velocity of 100 cm/s.  The left vertebral artery appears patent with normal antegrade  flow. The left internal carotid artery appears patent with peak  systolic velocity of 74 cm/s. The left external carotid artery  appears patent with peak systolic velocity 106 cm/s. The left  ICA/CCA ratio is 0.74.      Impression:       1.  Normal bilateral carotid duplex ultrasound with no  significant hemodynamic focus identified.    Electronically signed by:  Ankit Hall MD  3/6/2020 10:38 AM [facility]  Workstation: [id number]    XR Chest 1 View [[id number]] Collected:  03/05/20 2046     Updated:  03/05/20 2100    Narrative:       PROCEDURE: XR CHEST 1 VIEW    Clinical History: syncope    Indication:     Same as above.    Comparison:     None.    Technique:     Single portable frontal projection of the chest was done.    Findings:    The lung fields are well inflated.    There are no discrete airspace infiltrates, pneumothoraces or  pleural effusions.    The pulmonary  vascularity is normal.    The cardiomediastinal contour is  unremarkable.      Impression:       Impression:     There is no acute pleural-parenchymal process seen in the imaged  lung fields.    Electronically signed by:  Ryne Rider MD  3/5/2020 8:59 PM [facility]  Workstation: -[facility]        [place] [facility] Course:  The patient is a 69 y.o. female who presented to [facility] with recent history of syncope.  Patient was transferred to our facility from [facility] inpatient facility.  Patient presented to that facility 3 days ago with a history of syncope.  She underwent numerous evaluations including a neurological evaluation and all findings were found to be negative.  Neurology ruled out neurological causes.  As patient, who has a history of diabetic neuropathy, is established with Dr. Saravia of cardiology, she was transferred here for further evaluation.  Dr. Saravia and his cardiology team determined that patient likely suffered vasovagal syncope.  That there was no evidence of arrhythmia in this admission.  Patient will be set up with Dr. Saravia to plan for Zio to rule out any possible arrhythmias.  Orthostatic vital signs were negative, carotid duplex was normal.  TTE and recent ischemic studies were reviewed by Dr. Saravia at this time.  Patient blood pressure medication was titrated downward given her episodes of normotension, currently 115/60.  Her amlodipine PRN was discontinued and her losartan was titrated downward.  She will continue on her current beta-blocker dose.  Patient will follow-up with her primary care provider and actually has a follow-up with Dr. Saravia rescheduled on 3/11/2020.  Patient instructed to return with any concerning or worsening symptoms.  She verbalized understanding.    Condition on Discharge: Stable    Physical Exam on Discharge:  /60 (BP Location: Left arm, Patient Position: Lying)   Pulse 73   Temp 97.4 °F (36.3 °C)  "(Axillary)   Resp 18   Ht 162.6 cm (64.02\")   Wt 105 kg (231 lb 11.3 oz)   SpO2 97%   BMI 39.75 kg/m²   Physical Exam   Constitutional: She is oriented to person, place, and time. She appears well-developed and well-nourished.   HENT:   Head: Normocephalic and atraumatic.   Eyes: Conjunctivae are normal.   Cardiovascular: Normal rate and regular rhythm.   Pulmonary/Chest: Effort normal. No stridor. No respiratory distress. She has no wheezes.   Abdominal: Soft. Bowel sounds are normal. She exhibits no distension. There is no tenderness.   Neurological: She is alert and oriented to person, place, and time.   Skin: Skin is warm and dry. Capillary refill takes less than 2 seconds.   Psychiatric: She has a normal mood and affect. Her behavior is normal.     Discharge Disposition:  Home or Self Care    Discharge Medications:     Discharge Medications      Changes to Medications      Instructions Start Date   losartan 25 MG tablet  Commonly known as:  COZAAR  What changed:    · medication strength  · how much to take  · how to take this   12.5 mg, Oral, Daily         Continue These Medications      Instructions Start Date   aspirin 81 MG EC tablet   81 mg, Oral, Daily      atorvastatin 20 MG tablet  Commonly known as:  LIPITOR   40 mg, Daily      carvedilol 6.25 MG tablet  Commonly known as:  COREG   25 mg, 2 Times Daily      gabapentin 600 MG tablet  Commonly known as:  NEURONTIN   300 mg, Oral, 2 Times Daily, prn      metFORMIN 500 MG tablet  Commonly known as:  GLUCOPHAGE   500 mg, Oral, 2 Times Daily With Meals      prenatal (CLASSIC) vitamin 28-0.8 MG tablet tablet   Oral, Daily      TYLENOL 8 HOUR 650 MG 8 hr tablet  Generic drug:  acetaminophen   650 mg, Oral, Every 8 Hours PRN         Stop These Medications    amLODIPine 10 MG tablet  Commonly known as:  NORVASC            Discharge Diet:   Diet Instructions     Diet: Consistent Carbohydrate, Cardiac      Discharge Diet:   Consistent Carbohydrate  Cardiac    "          Activity at Discharge:   Activity Instructions     Activity as Tolerated            Discharge Care Plan/Instructions: Titration of blood pressure medications, follow-up with PCP, follow-up with cardiology.  Return with any concerning worsening symptoms.    Follow-up Appointments:   Future Appointments   Date Time Provider Department Center   3/11/2020 10:45 AM Maximiliano Saravia MD PhD [facility] None           This document has been electronically signed by LOLLY Perez on March 6, 2020 12:42 PM

## 2020-03-06 NOTE — DISCHARGE INSTRUCTIONS
Syncope  Syncope is when you pass out (faint) for a short time. It is caused by a sudden decrease in blood flow to the brain. Signs that you may be about to pass out include:  · Feeling dizzy or light-headed.  · Feeling sick to your stomach (nauseous).  · Seeing all white or all black.  · Having cold, clammy skin.  If you pass out, get help right away. Call your local emergency services (911 in the [place] Do not drive yourself to the hospital.  Follow these instructions at home:  Watch for any changes in your symptoms. Take these actions to stay safe and help with your symptoms:  Lifestyle  · Do not drive, use machinery, or play sports until your doctor says it is okay.  · Do not drink alcohol.  · Do not use any products that contain nicotine or tobacco, such as cigarettes and e-cigarettes. If you need help quitting, ask your doctor.  · Drink enough fluid to keep your pee (urine) pale yellow.  General instructions  · Take over-the-counter and prescription medicines only as told by your doctor.  · If you are taking blood pressure or heart medicine, sit up and stand up slowly. Spend a few minutes getting ready to sit and then stand. This can help you feel less dizzy.  · Have someone stay with you until you feel stable.  · If you start to feel like you might pass out, lie down right away and raise (elevate) your feet above the level of your heart. Breathe deeply and steadily. Wait until all of the symptoms are gone.  · Keep all follow-up visits as told by your doctor. This is important.  Get help right away if:  · You have a very bad headache.  · You pass out once or more than once.  · You have pain in your chest, belly, or back.  · You have a very fast or uneven heartbeat (palpitations).  · It hurts to breathe.  · You are bleeding from your mouth or your bottom (rectum).  · You have black or tarry poop (stool).  · You have jerky movements that you cannot control (seizure).  · You are confused.  · You have trouble  walking.  · You are very weak.  · You have vision problems.  These symptoms may be an emergency. Do not wait to see if the symptoms will go away. Get medical help right away. Call your local emergency services (911 in the [place] Do not drive yourself to the hospital.  Summary  · Syncope is when you pass out (faint) for a short time. It is caused by a sudden decrease in blood flow to the brain.  · Signs that you may be about to faint include feeling dizzy, light-headed, or sick to your stomach, seeing all white or all black, or having cold, clammy skin.  · If you start to feel like you might pass out, lie down right away and raise (elevate) your feet above the level of your heart. Breathe deeply and steadily. Wait until all of the symptoms are gone.  This information is not intended to replace advice given to you by your health care provider. Make sure you discuss any questions you have with your health care provider.  Document Released: 06/05/2009 Document Revised: 01/30/2019 Document Reviewed: 01/30/2019  [employer] Interactive Patient Education © 2020 [employer]

## 2020-04-06 ENCOUNTER — TELEPHONE (OUTPATIENT)
Dept: CARDIOLOGY | Facility: CLINIC | Age: 70
End: 2020-04-06

## 2020-04-06 NOTE — TELEPHONE ENCOUNTER
----- Message from Alma Holt sent at 4/2/2020  3:29 PM CDT -----  Contact: [phone]  Can we ask tomorrow if he will call with results. She has a flip phone and can't do my chart video      Spoke with patient who stated someone contacted her regarding her results.

## 2020-07-06 ENCOUNTER — OFFICE VISIT (OUTPATIENT)
Dept: CARDIOLOGY | Facility: CLINIC | Age: 70
End: 2020-07-06

## 2020-07-06 VITALS
OXYGEN SATURATION: 100 % | WEIGHT: 233.2 LBS | HEART RATE: 76 BPM | BODY MASS INDEX: 39.81 KG/M2 | HEIGHT: 64 IN | DIASTOLIC BLOOD PRESSURE: 82 MMHG | SYSTOLIC BLOOD PRESSURE: 162 MMHG

## 2020-07-06 DIAGNOSIS — E78.2 MIXED HYPERLIPIDEMIA: ICD-10-CM

## 2020-07-06 DIAGNOSIS — E66.09 CLASS 2 OBESITY DUE TO EXCESS CALORIES WITHOUT SERIOUS COMORBIDITY WITH BODY MASS INDEX (BMI) OF 39.0 TO 39.9 IN ADULT: ICD-10-CM

## 2020-07-06 DIAGNOSIS — I36.1 NON-RHEUMATIC TRICUSPID VALVE INSUFFICIENCY: ICD-10-CM

## 2020-07-06 DIAGNOSIS — I35.1 NONRHEUMATIC AORTIC VALVE INSUFFICIENCY: ICD-10-CM

## 2020-07-06 DIAGNOSIS — R60.0 BILATERAL LEG EDEMA: ICD-10-CM

## 2020-07-06 DIAGNOSIS — I27.20 PULMONARY HYPERTENSION (HCC): Primary | ICD-10-CM

## 2020-07-06 DIAGNOSIS — I10 ESSENTIAL HYPERTENSION: ICD-10-CM

## 2020-07-06 DIAGNOSIS — I34.0 NON-RHEUMATIC MITRAL REGURGITATION: ICD-10-CM

## 2020-07-06 PROCEDURE — 99214 OFFICE O/P EST MOD 30 MIN: CPT | Performed by: INTERNAL MEDICINE

## 2020-07-06 RX ORDER — ERGOCALCIFEROL 1.25 MG/1
50000 CAPSULE ORAL WEEKLY
COMMUNITY
Start: 2020-06-28 | End: 2021-06-11

## 2020-07-06 RX ORDER — FERROUS SULFATE 325(65) MG
325 TABLET ORAL DAILY
COMMUNITY

## 2020-07-06 RX ORDER — MULTIPLE VITAMINS W/ MINERALS TAB 9MG-400MCG
1 TAB ORAL DAILY
COMMUNITY

## 2020-07-06 RX ORDER — FUROSEMIDE 20 MG/1
20 TABLET ORAL EVERY OTHER DAY
COMMUNITY
End: 2022-09-29 | Stop reason: HOSPADM

## 2020-07-06 NOTE — PROGRESS NOTES
Cardiovascular Medicine      Maximiliano Saravia M.D., Ph.D., [facility]           History of Present Illness      1.PAH  2. MR  3. TR  4.AI  5.  Risks: HTN, HLD, DM2    Bacilio Shaffer is a 69 y.o. female returns to clinic for pulmonary arterial hypertension.  She has a history of valvular disease that I was seeing her for.  She has a history of an abnormal echocardiogram in 2017.  The patient's PA pressure was normal at that time.  She had reportedly mild aortic regurgitation, mitral regurgitation and tricuspid regurgitation.  She remains in valve clinic for clinical surveillance.  She was asymptomatic at her last appointment.  I updated her echo prior to her appointment today.  She returns for results.  She now has mild pulmonary hypertension with a PA pressure in the low 40s.  She continues to have mild aortic regurgitation and tricuspid regurgitation.  However, she likely has a more moderate degree of mitral regurgitation.  LV diameters were normal.   No increased cardiac awareness.  No dizziness, lightheadedness or syncope.  Her hypertension and dyslipidemia continues to be managed by her PCP.  She is medication compliant and denies side effects.      Review of Systems   Cardiovascular: Positive for dyspnea on exertion and leg swelling. Negative for chest pain, near-syncope, orthopnea, palpitations, paroxysmal nocturnal dyspnea and syncope.   Respiratory: Positive for shortness of breath. Negative for cough and hemoptysis.        family history includes Diabetes in an other family member; Heart disease in an other family member; Hypertension in an other family member; Stroke in an other family member.     reports that she has never smoked. She has never used smokeless tobacco. Drug use questions deferred to the physician. She reports that she does not drink alcohol.    Allergies   Allergen Reactions   • Atenolol Nausea And Vomiting   • Lortab [Hydrocodone-Acetaminophen] Mental Status Change         Current  Outpatient Medications:   •  acetaminophen (TYLENOL 8 HOUR) 650 MG 8 hr tablet, Take 650 mg by mouth Every 8 (Eight) Hours As Needed for Mild Pain ., Disp: , Rfl:   •  aspirin 81 MG EC tablet, Take 81 mg by mouth Daily., Disp: , Rfl:   •  atorvastatin (LIPITOR) 20 MG tablet, 40 mg Daily., Disp: , Rfl:   •  carvedilol (COREG) 6.25 MG tablet, 25 mg 2 (Two) Times a Day., Disp: , Rfl:   •  ferrous sulfate 325 (65 FE) MG tablet, Daily., Disp: , Rfl:   •  furosemide (LASIX) 20 MG tablet, Daily., Disp: , Rfl:   •  gabapentin (NEURONTIN) 600 MG tablet, Take 300 mg by mouth Daily As Needed. prn, Disp: , Rfl:   •  losartan (COZAAR) 25 MG tablet, Take 0.5 tablets by mouth Daily. (Patient taking differently: Take 100 mg by mouth Daily.), Disp: 30 tablet, Rfl: 0  •  metFORMIN (GLUCOPHAGE) 500 MG tablet, Take 500 mg by mouth 2 (Two) Times a Day With Meals., Disp: , Rfl:   •  Multiple Vitamins-Minerals (MULTIVITAMIN WITH MINERALS) tablet tablet, Take 1 tablet by mouth Daily., Disp: , Rfl:   •  vitamin D (ERGOCALCIFEROL) 1.25 MG (44315 UT) capsule capsule, Take 50,000 Units by mouth 1 (One) Time Per Week., Disp: , Rfl:   •  Prenatal Vit-Fe Fumarate-FA (PRENATAL, CLASSIC, VITAMIN) 28-0.8 MG tablet tablet, Take  by mouth Daily., Disp: , Rfl:     Physical Exam:  Vitals:    07/06/20 1400   BP: 162/82   Pulse: 76   SpO2: 100%     Body mass index is 40.03 kg/m².    GEN: alert, appears stated age and cooperative  Body Habitus: overweight  HEENT: Head: Normocephalic, no lesions, without obvious abnormality.  JVP: 6 cm of water at 45 degrees HJR: absent      Heart rate: normal  Heart Rhythm: regular     Heart Sounds: S1: normal intensity  S2: normal intensity  S3: absent   S4: absent  Opening Snap: absent  A2-OS:  N/A  Pericardial rub: absent    Ejection click: None      Murmurs: Systolic: I/VI GREGORY at RSB  Diastolic: none  Extremity: Moves spontaneously    DATA REVIEWED:         Results for orders placed in visit on 01/10/20   Adult Stress  Echo W/ Cont or Stress Agent if Necessary Per Protocol    Narrative · Left ventricular wall thickness is consistent with severe concentric   hypertrophy.  · Left ventricular systolic function is low normal at 51-55%.  · Stress EKG with target heart rate achieved. No evidence of ischemia.  · Some echocardiographic stress imaging was sub-target and decreases the   sensitivity and specificity of the test.  · No evidence of inducible ischemia at sub-target (minimum, 73%) heart   rate. Sensitivity and specificity of the test is decreased in this   scenario.          Assessment/Plan      1. Pulmonary hypertension (CMS/HCC). PAH/PVH. WHO Group 2; FC: III.  RV status: Normal. Perfusion status: good.    -Discussed evaluation, treatment and usual course.  -No current indication for PAH-specific medications  -No compelling indication at this time for RHC  -Will continue active clinical surveillance.  Signs and symptoms of worsening PAH and RV failure were discussed.  -I have asked the patient that if they were to move to be certain they see someone with expertise in PAH. These providers can be located at [email or web address].  -6MWT    2. Nonrheumatic aortic valve insufficiency/ Non-rheumatic tricuspid valve insufficiency/ Non-rheumatic mitral regurgitation. ACC stage B.  There are no surgical indications at this time.  · The patient has been advised to remain in clinical surveillance every 6 months.  · Signs and symptoms of worsening valve disease discussed.  I've asked the patient contact me for an earlier appointment if these develop.  · I've recommended a repeat 2D TTE every 1 years.  · TTE due: 2021.  No indication based on 2017 ACC/AHA guidelines for IE prophylaxis for dental procedures: Optimal oral health is recommended through regular professional dental care and the use of appropriate dental products, such as manual, powered, and ultrasonic toothbrushes; dental floss; and other plaque-removal devices  There is  INDICATION FOR IE prophylaxis for dental procedures based on 2017 ACC/AHA guidelines: Optimal oral health is recommended through regular professional dental care and the use of appropriate     3. Cardiac Risk Assessments based on 2019 ACCF guidelines:  -A team-based care approach is recommended for the control of risk factors associated with ASCAD.  As such, Bacilio Shaffer was requested to have ongoing follow-up with their PCP.   Continue follow-up visits with PCP for monitoring of labs; diet emphasizing intake of vegetables, fruits, nuts, whole grains and fish is recommended. Physical activity recommendations were provided.   Essential HTN is a significant risk factor for stroke, heart disease and vascular disease. I've recommended the patient continue current medications, if any, as prescribed by the primary care provider. I recommended they have close follow-up for ongoing mgmt of this and the medical comorbidities associated with HTN with their PCP.  They were also provided with information regarding maintaining a healthy weight, heart-healthy dietary pattern DASH information.  Goal blood pressure less than 130/80.  The patient's BMI is recommended to be calculated at least annually.  The patient's BMI is Body mass index is 40.03 kg/m²..  This places the patient in weight class:  Obese Class III extreme obesity: > or equal to 40kg/m2. They have been asked to have close PCP follow-up.  Tobacco status assessed at every visits.  The patient's nicotine status: has never smoked        Return in about 3 months (around 10/6/2020).

## 2021-02-09 ENCOUNTER — LAB (OUTPATIENT)
Dept: LAB | Facility: HOSPITAL | Age: 71
End: 2021-02-09

## 2021-02-09 ENCOUNTER — HOSPITAL ENCOUNTER (OUTPATIENT)
Dept: GENERAL RADIOLOGY | Facility: HOSPITAL | Age: 71
Discharge: HOME | End: 2021-02-09

## 2021-02-09 ENCOUNTER — OFFICE VISIT (OUTPATIENT)
Dept: CARDIOLOGY | Facility: CLINIC | Age: 71
End: 2021-02-09

## 2021-02-09 ENCOUNTER — DOCUMENTATION (OUTPATIENT)
Dept: CARDIOLOGY | Facility: CLINIC | Age: 71
End: 2021-02-09

## 2021-02-09 VITALS
OXYGEN SATURATION: 98 % | SYSTOLIC BLOOD PRESSURE: 136 MMHG | BODY MASS INDEX: 40.63 KG/M2 | HEART RATE: 70 BPM | DIASTOLIC BLOOD PRESSURE: 86 MMHG | WEIGHT: 238 LBS | HEIGHT: 64 IN

## 2021-02-09 DIAGNOSIS — R06.09 DYSPNEA ON EXERTION: ICD-10-CM

## 2021-02-09 DIAGNOSIS — I35.1 NONRHEUMATIC AORTIC VALVE INSUFFICIENCY: ICD-10-CM

## 2021-02-09 DIAGNOSIS — I34.0 NON-RHEUMATIC MITRAL REGURGITATION: ICD-10-CM

## 2021-02-09 DIAGNOSIS — R06.83 SNORES: ICD-10-CM

## 2021-02-09 DIAGNOSIS — Z13.220 SCREENING CHOLESTEROL LEVEL: ICD-10-CM

## 2021-02-09 DIAGNOSIS — J98.4 RESTRICTIVE LUNG DISEASE: ICD-10-CM

## 2021-02-09 DIAGNOSIS — I36.1 NON-RHEUMATIC TRICUSPID VALVE INSUFFICIENCY: ICD-10-CM

## 2021-02-09 DIAGNOSIS — I27.20 PULMONARY HYPERTENSION (HCC): ICD-10-CM

## 2021-02-09 DIAGNOSIS — I27.20 PULMONARY HYPERTENSION (HCC): Primary | ICD-10-CM

## 2021-02-09 LAB
ALBUMIN SERPL-MCNC: 4.2 G/DL (ref 3.5–5.2)
ALBUMIN/GLOB SERPL: 1.4 G/DL
ALP SERPL-CCNC: 84 U/L (ref 39–117)
ALT SERPL W P-5'-P-CCNC: 16 U/L (ref 1–33)
ANION GAP SERPL CALCULATED.3IONS-SCNC: 12.7 MMOL/L (ref 5–15)
AST SERPL-CCNC: 17 U/L (ref 1–32)
BILIRUB SERPL-MCNC: 0.3 MG/DL (ref 0–1.2)
BUN SERPL-MCNC: 17 MG/DL (ref 8–23)
BUN/CREAT SERPL: 15.5 (ref 7–25)
CALCIUM SPEC-SCNC: 10.3 MG/DL (ref 8.6–10.5)
CHLORIDE SERPL-SCNC: 105 MMOL/L (ref 98–107)
CHOLEST SERPL-MCNC: 137 MG/DL (ref 0–200)
CO2 SERPL-SCNC: 22.3 MMOL/L (ref 22–29)
CREAT SERPL-MCNC: 1.1 MG/DL (ref 0.57–1)
DEPRECATED RDW RBC AUTO: 39.7 FL (ref 37–54)
ERYTHROCYTE [DISTWIDTH] IN BLOOD BY AUTOMATED COUNT: 11.7 % (ref 12.3–15.4)
GFR SERPL CREATININE-BSD FRML MDRD: 59 ML/MIN/1.73
GLOBULIN UR ELPH-MCNC: 2.9 GM/DL
GLUCOSE SERPL-MCNC: 94 MG/DL (ref 65–99)
HCT VFR BLD AUTO: 35.7 % (ref 34–46.6)
HDLC SERPL-MCNC: 49 MG/DL (ref 40–60)
HGB BLD-MCNC: 11.9 G/DL (ref 12–15.9)
LDLC SERPL CALC-MCNC: 73 MG/DL (ref 0–100)
LDLC/HDLC SERPL: 1.48 {RATIO}
MCH RBC QN AUTO: 31.2 PG (ref 26.6–33)
MCHC RBC AUTO-ENTMCNC: 33.3 G/DL (ref 31.5–35.7)
MCV RBC AUTO: 93.5 FL (ref 79–97)
NT-PROBNP SERPL-MCNC: 737.9 PG/ML (ref 0–900)
PLATELET # BLD AUTO: 279 10*3/MM3 (ref 140–450)
PMV BLD AUTO: 12.1 FL (ref 6–12)
POTASSIUM SERPL-SCNC: 3.8 MMOL/L (ref 3.5–5.2)
PROT SERPL-MCNC: 7.1 G/DL (ref 6–8.5)
RBC # BLD AUTO: 3.82 10*6/MM3 (ref 3.77–5.28)
SODIUM SERPL-SCNC: 140 MMOL/L (ref 136–145)
TRIGL SERPL-MCNC: 78 MG/DL (ref 0–150)
VLDLC SERPL-MCNC: 15 MG/DL (ref 5–40)
WBC # BLD AUTO: 9.15 10*3/MM3 (ref 3.4–10.8)

## 2021-02-09 PROCEDURE — 93000 ELECTROCARDIOGRAM COMPLETE: CPT | Performed by: INTERNAL MEDICINE

## 2021-02-09 PROCEDURE — 94618 PULMONARY STRESS TESTING: CPT | Performed by: INTERNAL MEDICINE

## 2021-02-09 PROCEDURE — 99214 OFFICE O/P EST MOD 30 MIN: CPT | Performed by: INTERNAL MEDICINE

## 2021-02-09 PROCEDURE — 83880 ASSAY OF NATRIURETIC PEPTIDE: CPT | Performed by: INTERNAL MEDICINE

## 2021-02-09 PROCEDURE — 36415 COLL VENOUS BLD VENIPUNCTURE: CPT | Performed by: INTERNAL MEDICINE

## 2021-02-09 PROCEDURE — 85027 COMPLETE CBC AUTOMATED: CPT | Performed by: INTERNAL MEDICINE

## 2021-02-09 PROCEDURE — 71046 X-RAY EXAM CHEST 2 VIEWS: CPT

## 2021-02-09 PROCEDURE — 80053 COMPREHEN METABOLIC PANEL: CPT | Performed by: INTERNAL MEDICINE

## 2021-02-09 PROCEDURE — 80061 LIPID PANEL: CPT | Performed by: INTERNAL MEDICINE

## 2021-02-09 RX ORDER — AMLODIPINE BESYLATE 10 MG/1
TABLET ORAL AS NEEDED
COMMUNITY
Start: 2021-01-19 | End: 2021-06-11 | Stop reason: SDUPTHER

## 2021-02-09 RX ORDER — SODIUM CHLORIDE 0.9 % (FLUSH) 0.9 %
10 SYRINGE (ML) INJECTION AS NEEDED
Status: CANCELLED | OUTPATIENT
Start: 2021-02-23

## 2021-02-09 RX ORDER — SODIUM CHLORIDE 0.9 % (FLUSH) 0.9 %
3 SYRINGE (ML) INJECTION EVERY 12 HOURS SCHEDULED
Status: CANCELLED | OUTPATIENT
Start: 2021-02-23

## 2021-02-09 NOTE — PROGRESS NOTES
"   Cardiovascular Medicine      Maximiilano Saravia M.D., Ph.D., [facility]           History of Present Illness      1.PAH  A. PFTs 2020, restrictive  -FEV1/FVC 99% of predicted  -FVC 62% of predicted  -TLC 76% of predicted  -DLCO 45% of predicted  2. MR  3. TR  4.AI  5.  Risks: HTN, HLD, DM2 , Obese, Snores    Bacilio Shaffer is a 70 y.o. female returns to clinic for pulmonary arterial hypertension.      She has been followed in the past for multi valvular heart disease.  Because of this she is remain in clinical surveillance with follow-up 2D TTE scheduled.  She is followed for mitral, tricuspid and aortic valve disease.  Her last 2D echocardiogram was in January 2020 and had moderate mitral regurgitation.  Otherwise, she has mild tricuspid and aortic valve disease.  In 2017 her 2D echocardiogram had a normal pulmonary pressure.  However, when her 2D echocardiogram was updated in 2020 her pulmonary pressure was in the low 40s.  At that time her mitral regurgitation was likely more moderate.  LV diameters were normal.  RV status was normal.    She initially preferred noninvasive management.  She did not originally want to see a sleep physician.  I felt that her pulmonary pressures were likely from her worsening mitral regurgitation, so she was recommended to undergo ongoing surveillance.  She was agreeable to having PFTs performed.  These were performed in 2020 and were consistent with restrictive lung physiology.  Patient's FEV1/FVC was 99% of predicted.  The patient's FVC was low at 62% of predicted with TLC 76% of predicted.  The DLCO was also low at 45% of predicted.  The patient has not had any CT imaging.  She did have a chest x-ray which did not show any evidence of ILD.    She returns today in follow-up.  Unfortunately, she is having worsening exercise intolerance and severe exertional dyspnea.  She tells me that she can \"barely make of bed,\" before she has to rest.  She was started on furosemide.  This led to " improvement in her lower extremity edema.  She denies orthopnea, PND or recent lower extremity edema.  She has had no resting, exertional or nocturnal angina.  She has been told that she snores in the past.  She does endorse EDS.     Review of Systems   Cardiovascular: Positive for dyspnea on exertion. Negative for chest pain, claudication, cyanosis, irregular heartbeat, leg swelling, near-syncope, orthopnea, palpitations, paroxysmal nocturnal dyspnea and syncope.   Respiratory: Positive for shortness of breath and snoring. Negative for cough, hemoptysis, sleep disturbances due to breathing, sputum production and wheezing.    All other systems reviewed and are negative.      family history includes Diabetes in an other family member; Heart disease in an other family member; Hypertension in an other family member; Stroke in an other family member.     reports that she has never smoked. She has never used smokeless tobacco. Drug use questions deferred to the physician. She reports that she does not drink alcohol.    Allergies   Allergen Reactions   • Atenolol Nausea And Vomiting   • Lortab [Hydrocodone-Acetaminophen] Mental Status Change         Current Outpatient Medications:   •  acetaminophen (TYLENOL 8 HOUR) 650 MG 8 hr tablet, Take 650 mg by mouth Every 8 (Eight) Hours As Needed for Mild Pain ., Disp: , Rfl:   •  aspirin 81 MG EC tablet, Take 81 mg by mouth Daily., Disp: , Rfl:   •  atorvastatin (LIPITOR) 20 MG tablet, 40 mg Daily., Disp: , Rfl:   •  carvedilol (COREG) 6.25 MG tablet, 25 mg 2 (Two) Times a Day., Disp: , Rfl:   •  ferrous sulfate 325 (65 FE) MG tablet, Daily., Disp: , Rfl:   •  furosemide (LASIX) 20 MG tablet, 20 mg As Needed., Disp: , Rfl:   •  gabapentin (NEURONTIN) 600 MG tablet, Take 300 mg by mouth Daily As Needed. prn, Disp: , Rfl:   •  losartan (COZAAR) 25 MG tablet, Take 0.5 tablets by mouth Daily. (Patient taking differently: Take 100 mg by mouth Daily.), Disp: 30 tablet, Rfl: 0  •   metFORMIN (GLUCOPHAGE) 500 MG tablet, Take 500 mg by mouth 2 (Two) Times a Day With Meals., Disp: , Rfl:   •  Multiple Vitamins-Minerals (MULTIVITAMIN WITH MINERALS) tablet tablet, Take 1 tablet by mouth Daily., Disp: , Rfl:   •  vitamin D (ERGOCALCIFEROL) 1.25 MG (90392 UT) capsule capsule, Take 50,000 Units by mouth 1 (One) Time Per Week., Disp: , Rfl:   •  amLODIPine (NORVASC) 10 MG tablet, As Needed., Disp: , Rfl:   •  Prenatal Vit-Fe Fumarate-FA (PRENATAL, CLASSIC, VITAMIN) 28-0.8 MG tablet tablet, Take  by mouth Daily., Disp: , Rfl:     Physical Exam:  Vitals:    02/09/21 1120   BP: 136/86   Pulse: 70   SpO2: 98%     Body mass index is 40.85 kg/m².    GEN: alert, appears stated age and cooperative  Body Habitus: overweight  HEENT: Head: Normocephalic, no lesions, without obvious abnormality.  JVP: 6 cm of water at 45 degrees HJR: absent      Heart rate: normal  Heart Rhythm: regular     Heart Sounds: S1: normal intensity  S2: normal intensity  S3: absent   S4: absent  Opening Snap: absent  A2-OS:  N/A  Pericardial rub: absent    Ejection click: None      Murmurs: Systolic: I/VI GREGORY at RSB  Diastolic: none  Extremity: Moves spontaneously    DATA REVIEWED:       I interpreted the EKG today.  Normal sinus rhythm with sinus arrhythmia.  Axis is normal and -2 degrees.  When compared to an EKG dated March 6, 2020, premature atrial complexes are no longer present.      Results for orders placed in visit on 01/10/20   Adult Stress Echo W/ Cont or Stress Agent if Necessary Per Protocol    Narrative · Left ventricular wall thickness is consistent with severe concentric   hypertrophy.  · Left ventricular systolic function is low normal at 51-55%.  · Stress EKG with target heart rate achieved. No evidence of ischemia.  · Some echocardiographic stress imaging was sub-target and decreases the   sensitivity and specificity of the test.  · No evidence of inducible ischemia at sub-target (minimum, 73%) heart   rate.  Sensitivity and specificity of the test is decreased in this   scenario.        Stress echocardiogram showed no evidence of inducible ischemia at subtarget heart rate.  This was at 73% of predicted.      Assessment/Plan      1. Pulmonary hypertension (CMS/HCC).  This is likely WHO-group-2/3.  Today, she is reporting worsening functional capacity.  PFTs were restrictive in 2020.  She has not had a RHC.  No signs of right ventricular failure.  I am concerned that she is reporting severe exercise intolerance.  I do note that she has gained some weight since last year which may be contributing to her overall deconditioning.  I am also concerned that she has had worsening pulmonary pressures.  Typical work-up was discussed.  She is agreeable to proceeding.    RHC PRE-OP:  The indications, risks and benefits of diagnostic right  heart cardiac catheterization, angiography, conscious sedation, and possible blood transfusion were discussed in detail with the patient. The increased risks that are present with pulmonary arterial hypertension with right heart catheterization were emphasized. I have cited a complication rate of 1.1% with total adverse events. This includes a 0.3% risk of inpatient admission due to a complication. I have cited a 0.5% risk of fatality. This includes the risk of PA perforation. I have cited a risk of hematoma, vagal reactions and pneumothorax as <1%. Pulmonary vasoreactivity testing, if indicated, can cause hypotension, bronchospasm, chest pain and SOB. Pulmonary angiography, if indicated, can rarely cause bronchospasm and one reported death has been cited due to intrapulmonary hemorrhage. I have also discussed the possible risks, though low, of heart block and the need for emergency venous pacing. Additionally, I went over that if a rare complication requires a thoracotomy or median sternotomy that this would be performed emergently by CTS. The patient is willing to proceed.      ASA class is ASA  3 - Patient with moderate systemic disease with functional limitations; Mallampati is II (hard and soft palate, upper portion of tonsils anduvula visible).    -RHC, RUE access, possible VD challenge  - Adult Transthoracic Echo Complete W/ Cont if Necessary Per Protocol  - Pulmonary Function Test  - Case Request Cath Lab: Right Heart Cath  - sodium chloride 0.9 % flush 3 mL  - sodium chloride 0.9 % flush 10 mL  - BNP  - CBC (No Diff)    2. Dyspnea on exertion, again likely multifactorial.  She is obese and sedentary with a BMI 40.8.  Caloric restriction was recommended with increased physical activity.  Will need to exclude worsening pulmonary pressures and valvular heart disease.  I am also suspicious that she has RAYMOND.  Because her PFTs were restrictive last year she may have restrictive lung disease, though her CXR is not suggestive of this.  We can place a CT scan on hold for now pending her initial work-up.  I would like to check a 2 view CXR today and send some basic labs.  - XR Chest 2 View  - Comprehensive Metabolic Panel  -BNP    3. Nonrheumatic aortic valve insufficiency/Non-rheumatic mitral regurgitation/Non-rheumatic tricuspid valve insufficiency. ACC stage B. There are no surgical indications at this time. Signs and symptoms of worsening valve disease discussed.  I've asked the patient contact me for an earlier appointment if these develop. The patient has been advised to remain in clinical surveillance.     • I've recommended a repeat 2D TTE every 1 year with the next TTE due: 2021.    • No indication based on 2017 ACC/AHA guidelines for IE prophylaxis for dental procedures: Optimal oral health is recommended through regular professional dental care and the use of appropriate dental products, such as manual, powered, and ultrasonic toothbrushes; dental floss; and other plaque-removal devices    4. Restrictive lung disease. Obesity is associated with decreases in lung volumes.  It is also associated with  lowering of FEV1, FVC and FRC.  In some patients, particularly those with BMI greater than 40, there can be a decrease in RV and TLC.  Excessive weight on the chest wall can increase airway resistance and work of breathing due to lower chest wall compliance and respiratory muscle endurance.  While obesity can certainly lead to restrictive lung disease it is important to exclude ILD. CT chest has not been performed.  Respiratory muscle function also deteriorates and obesity in a pattern similar to other chronic respiratory diseases.  -Encouraged caloric restriction, aerobic exercise  -PFTs  -CT scan--DEFERRED pending initial work-up    5. Screening cholesterol level  - Lipid Panel    6. The BMI is Body mass index is 40.85 kg/m².. ACC/AHA guidelines recommend that height, weight and BMI is calculated at visits.  The patient has been provided with information regarding the elevated risk of cardiovascular disease, type 2 diabetes and all-cause mortality.  Sustained weight loss of 3-5% is likely to result in clinically meaningful reductions in triglycerides, hemoglobin A1c, blood glucose, and the risk of developing type 2 diabetes.  The greater the amount of weight loss typically the greater reduction in blood pressure, reduction and need for medications to control blood pressure, improvements in blood glucose and lipids.  Handout was provided on the patient's BMI.  The patient has been encouraged to follow-up with their primary care provider.    7. Snores. Concerns for sleep disordered breathing. There is concern for RAYMOND. STOP-Bang screening questionnaire was performed and yielded Moderate Risk . No prior nocturnal polysomnography. The risks of untreated RAYMOND were explained, as well as the need for sleep study.   -Avoid ETOH, weight reduction  -Treatment of HTN, per PCP  -1. Referral to Pulmonology for sleep study    Return to first available appointment with same-day testing for PFTs, 2D TTE

## 2021-02-09 NOTE — PROGRESS NOTES
Adriana Moore  Procedure: 6 Minute Walk Test   Indication:pulmonary hypertension    Pretest: BP:136/86               HR:70               Sa02:98               Dyspnea:2               Fatigue:2    Post Test: BP:150/90               HR:110               Sa02:98               Dyspnea:4               Fatigue:7    First 6MWT:2/25/20    Supplemental oxygen during test:no    Stopped before 6 minutes:no    Pauses:none    Results in distance walked:250.46 m    Did individual experience any pain or discomfort:no    Attestation:  I was immediately available for the above test and agree with the data.     NYHA Functional Class  IV.    6MWT speed today is: Limited Community Ambulator (0.4-0.8 m/s)        Maximiliano Saravia MD PhD    -----------------------------------------------------------------------------------------------------------------  [facility] performs 6MWT to the ATS guidelines for 6MWT (2002). Predicted distance is from:      -------------------------------------------------------------------------------------------------------------

## 2021-02-09 NOTE — PATIENT INSTRUCTIONS
Pulmonary Hypertension  Pulmonary hypertension is a long-term (chronic) condition in which there is high blood pressure in the arteries in the lungs (pulmonary arteries). This condition occurs when pulmonary arteries become narrow and tight, making it harder for blood to flow through the lungs. This in turn makes the heart work harder to pump blood through the lungs, making it harder for you to breathe.  Over time, pulmonary hypertension can weaken and damage the heart muscle, specifically the right side of the heart. Pulmonary hypertension is a serious condition that can be life-threatening.  What are the causes?  This condition may be caused by different medical conditions. It can be categorized by cause into five groups:  · Group 1: Pulmonary hypertension that is caused by abnormal growth of small blood vessels in the lungs (pulmonary arterial hypertension). The abnormal blood vessel growth may have no known cause, or it may be:  ? Passed from parent to child (hereditary).  ? Caused by another disease, such as a connective tissue disease (including lupus or scleroderma), congenital heart disease, liver disease, or HIV.  ? Caused by certain medicines or poisons (toxins).  · Group 2: Pulmonary hypertension that is caused by weakness of the left chamber of the heart (left ventricle) or heart valve disease.  · Group 3: Pulmonary hypertension that is caused by lung disease or low oxygen levels. Causes in this group include:  ? Emphysema or chronic obstructive pulmonary disease (COPD).  ? Untreated sleep apnea.  ? Pulmonary fibrosis.  ? Long-term exposure to high altitudes in certain people who may already be at higher risk for pulmonary hypertension.  · Group 4: Pulmonary hypertension that is caused by blood clots in the lungs (pulmonary emboli).  · Group 5: Other causes of pulmonary hypertension, such as sickle cell anemia, sarcoidosis, tumors pressing on the pulmonary arteries, and various other diseases.  What are  the signs or symptoms?  Symptoms of this condition include:  · Shortness of breath. You may notice shortness of breath with:  ? Activity, such as walking.  ? Minimal activity, such as getting dressed.  ? No activity, like when you are sitting still.  · A cough. Sometimes, bloody mucus from the lungs may be coughed up (hemoptysis).  · Tiredness and fatigue.  · Dizziness, lightheadedness, or fainting, especially with physical activity.  · Rapid heartbeat, or feeling your heart flutter or skip a beat (palpitations).  · Veins in the neck getting larger.  · Swelling of the lower legs, abdomen, or both.  · Bluish color of the lips and fingertips.  · Chest pain or tightness in the chest.  · Abdominal pain, especially in the upper abdomen.  How is this diagnosed?  This condition may be diagnosed based on one or more of the following tests:  · Chest X-ray.  · Blood tests.  · CT scan.  · Pulmonary function test. This test measures how much air your lungs can hold. It also tests how well air moves in and out of your lungs.  · 6-minute walk test. This tests how severe your condition is in relation to your activity levels.  · Electrocardiogram (ECG). This test records the electrical impulses of the heart.  · Echocardiogram. This test uses sound waves (ultrasound) to produce an image of the heart.  · Cardiac catheterization. This is a procedure in which a thin tube (catheter) is passed into the pulmonary artery and used to test the pressure in your pulmonary artery and the right side of your heart.  · Lung biopsy. This involves having a procedure to remove a small sample of lung tissue for testing. This may help determine an underlying cause of your pulmonary hypertension.  How is this treated?  There is no cure for this condition, but treatment can help to relieve symptoms and slow the progress of the condition. Treatment may include:  · Cardiac rehabilitation. This is a treatment program that includes exercise training,  education, and counseling to help you get stronger and return to an active lifestyle.  · Oxygen therapy.  · Medicines that:  ? Lower blood pressure.  ? Relax (dilate) the pulmonary blood vessels.  ? Help the heart beat more efficiently and pump more blood.  ? Help the body get rid of extra fluid (diuretics).  ? Thin the blood in order to prevent blood clots in the lungs.  · Lung surgery to relieve pressure on the heart, for severe cases that do not respond to medical treatment.  · Heart-lung transplant, or lung transplant. This may be done in very severe cases.  Follow these instructions at home:  Eating and drinking    · Eat a healthy diet that includes plenty of fresh fruits and vegetables, whole grains, and beans.  · Limit your salt (sodium) intake to less than 2,300 mg a day.  Lifestyle  · Do not use any products that contain nicotine or tobacco, such as cigarettes and e-cigarettes. If you need help quitting, ask your health care provider.  · Avoid secondhand smoke.  Activity  · Get plenty of rest.  · Exercise as directed. Talk with your health care provider about what type of exercise is safe for you.  · Avoid hot tubs and saunas.  · Avoid high altitudes.  General instructions  · Take over-the-counter and prescription medicines only as told by your health care provider. Do not change or stop medicines without checking with your health care provider.  · Stay up to date on your vaccines, especially yearly flu (influenza) and pneumonia vaccines.  · If you are a woman of child-bearing age, avoid becoming pregnant. Talk with your health care provider about birth control.  · Consider ways to get support for anxiety and stress of living with pulmonary hypertension. Talk with your health care provider about support groups and online resources.  · Use oxygen therapy at home as directed.  · Keep track of your weight. Weight gain could be a sign that your condition is getting worse.  · Keep all follow-up visits as told by  your health care provider. This is important.  Contact a health care provider if:  · Your cough gets worse.  · You have more shortness of breath than usual, or you start to have trouble doing activities that you could do before.  · You need to use medicines or oxygen more frequently or in higher dosages than usual.  Get help right away if:  · You have severe shortness of breath.  · You have chest pain or pressure.  · You cough up blood.  · You have swelling of your feet or legs that gets worse.  · You have rapid weight gain over a period of 1-2 days.  · Your medicines or oxygen do not provide relief.  Summary  · Pulmonary hypertension is a chronic condition in which there is high blood pressure in the arteries in the lungs (pulmonary arteries).  · Pulmonary hypertension is a serious condition that can be life-threatening. It can be caused by a variety of illnesses.  · Treatment may involve taking medicines and using oxygen therapy. Severe cases may require surgery or a transplant.  This information is not intended to replace advice given to you by your health care provider. Make sure you discuss any questions you have with your health care provider.  Document Revised: 11/30/2018 Document Reviewed: 03/13/2018  [employer] Patient Education © 2020 [employer]      Pulmonary Function Tests  Pulmonary function tests (PFTs) are used to measure how well your lungs work, find out what is causing your lung problems, and figure out the best treatment for you. You may have PFTs:  · When you have an illness involving the lungs.  · To follow changes in your lung function over time if you have a chronic lung disease.  · If you are an industrial . This checks the effects of being exposed to chemicals over a long period of time.  · To check lung function before having surgery or other procedures.  · To check your lungs if you smoke.  · To check if prescribed medicines or treatments are helping your lungs.  Your results  will be compared to the expected lung function of someone with healthy lungs who is similar to you in:  · Age.  · Gender.  · Height.  · Weight.  · Race or ethnicity.  This is done to show how your lungs compare to normal lung function (percent predicted). This is how your health care provider knows if your lung function is normal or not. If you have had PFTs done before, your health care provider will compare your current results with past results. This shows if your lung function is better, worse, or the same as before.  Tell a health care provider about:  · Any allergies you have.  · All medicines you are taking, including inhaler or nebulizer medicines, vitamins, herbs, eye drops, creams, and over-the-counter medicines.  · Any blood disorders you have.  · Any surgeries you have had, especially recent eye surgery, abdominal surgery, or chest surgery. These can make PFTs difficult or unsafe.  · Any medical conditions you have, including chest pain or heart problems, tuberculosis, or respiratory infections such as pneumonia, a cold, or the flu.  · Any fear of being in closed spaces (claustrophobia). Some of your tests may be in a closed space.  What are the risks?  Generally, this is a safe procedure. However, problems may occur, including:  · Light-headedness due to over-breathing (hyperventilation).  · An asthma attack from deep breathing.  · A collapsed lung.  What happens before the procedure?  · Take over-the-counter and prescription medicines only as told by your health care provider. If you take inhaler or nebulizer medicines, ask your health care provider which medicines you should take on the day of your testing. Some inhaler medicines may interfere with PFTs if they are taken shortly before the tests.  · Follow your health care provider's instructions on eating and drinking restrictions. This may include avoiding eating large meals and drinking alcohol before the testing.  · Do not use any products that  contain nicotine or tobacco, such as cigarettes and e-cigarettes. If you need help quitting, ask your health care provider.  · Wear comfortable clothing that will not interfere with breathing.  What happens during the procedure?    · You will be given a soft nose clip to wear. This is done so all of your breaths will go through your mouth instead of your nose.  · You will be given a germ-free (sterile) mouthpiece. It will be attached to a machine that measures your breathing (spirometer).  · You will be asked to do various breathing maneuvers. The maneuvers will be done by breathing in (inhaling) and breathing out (exhaling). You may be asked to repeat the maneuvers several times before the testing is done.  · It is important to follow the instructions exactly to get accurate results. Make sure to blow as hard and as fast as you can when you are told to do so.  · You may be given a medicine that makes the small air passages in your lungs larger (bronchodilator) after testing has been done. This medicine will make it easier for you to breathe.  · The tests will be repeated after the bronchodilator has taken effect.  · You will be monitored carefully during the procedure for faintness, dizziness, trouble breathing, or any other problems.  The procedure may vary among health care providers and hospitals.  What happens after the procedure?  · It is up to you to get your test results. Ask your health care provider, or the department that is doing the tests, when your results will be ready. After you have received your test results, talk with your health care provider about treatment options, if necessary.  Summary  · Pulmonary function tests (PFTs) are used to measure how well your lungs work, find out what is causing your lung problems, and figure out the best treatment for you.  · Wear comfortable clothing that will not interfere with breathing.  · It is up to you to get your test results. After you have received them,  talk with your health care provider about treatment options, if necessary.  This information is not intended to replace advice given to you by your health care provider. Make sure you discuss any questions you have with your health care provider.  Document Revised: 12/15/2017 Document Reviewed: 11/09/2017  [employer] Patient Education © 2020 [employer]      Pulmonary Artery Catheterization    Pulmonary artery catheterization is a procedure used to test blood movement through the heart and to monitor the heart's function. In this procedure, a thin, flexible tube (catheter) is passed into the right side of the heart and into the main artery that carries blood from your heart to your lungs (pulmonary artery). The procedure may be used during heart or blood vessel surgery, during cardiac catheterization procedures, or to monitor serious conditions in the intensive care unit.  Tell a health care provider about:  · Any allergies you have.  · All medicines you are taking, including vitamins, herbs, eye drops, creams, and over-the-counter medicines.  · Any problems you or family members have had with anesthetic medicines.  · Any blood disorders you have.  · Any surgeries you have had.  · Any medical conditions you have.  · Whether you are pregnant or may be pregnant.  What are the risks?  Generally, this is a safe procedure. However, problems may occur, including:  · Bruising or bleeding at the catheter insertion site.  · Injury to the vein where the catheter was inserted.  · Lung puncture. This is a risk if neck or chest veins are used.  · Infection.  The following problems may also occur, but they are rare:  · Abnormal heart rhythms.  · Low blood pressure.  · Fluid buildup around the heart.  · Blocked blood vessel caused by a blood clot (embolism).  What happens before the procedure?  Medicines  Ask your health care provider about:  · Changing or stopping your regular medicines. This is especially important if you are  taking diabetes medicines or blood thinners.  · Taking medicines such as aspirin and ibuprofen. These medicines can thin your blood. Do not take these medicines unless your health care provider tells you to take them.  · Taking over-the-counter medicines, vitamins, herbs, and supplements.  General instructions  · Follow instructions from your health care provider about eating or drinking restrictions.  · Plan to have someone take you home from the hospital or clinic.  · Ask your health care provider what steps will be taken to help prevent infection. These may include:  ? Removing hair at the surgery site.  ? Washing skin with a germ-killing soap.  ? Antibiotic medicine.  What happens during the procedure?    · An IV will be inserted into one of your veins.  · You will be given one or both of the following:  ? A medicine to help you relax (sedative).  ? A medicine to numb the area (local anesthetic).  · A small incision will be made in a vein in the insertion area.  · A catheter will be inserted through the incision and into the vein. The health care provider will carefully move the catheter into the upper chamber of the heart (right atrium). X-rays may be used to help guide the catheter to the right place.  · The catheter will be threaded through two heart valves (tricuspid valve and pulmonary valve) and placed into the pulmonary artery.  · Blood pressure in the pulmonary artery will be measured as soon as the catheter is in place.  · During the procedure, your heart's rhythm will be watched constantly using an electrocardiogram (ECG).  · The catheter will be removed after tests and monitoring have been completed.  The procedure may vary among health care providers and hospitals.  What happens after the procedure?  · Your blood pressure, heart rate, breathing rate, and blood oxygen level will be monitored until you leave the hospital or clinic.  Summary  · Pulmonary artery catheterization is a procedure that is  used to test blood movement through the heart and to monitor the heart's function.  · The procedure may be used during heart or blood vessel surgery, during cardiac catheterization procedures, or to monitor serious conditions in the intensive care unit.    Natriuretic Peptides Test  Why am I having this test?  The natriuretic peptides test helps your health care provider manage heart failure and other heart abnormalities. You may have this test:  If you have symptoms that may be caused by heart failure or a heart attack, such as shortness of breath or fatigue.  To monitor the treatment and advancement (progression) of heart disease.  To check for signs that your body's disease-fighting (immune) system is attacking your newly transplanted heart (rejection).  This test can help your health care provider determine if your symptoms are caused from heart failure, or from another condition.  What is being tested?  Natriuretic peptides (NPs) are hormones that the heart cells make in response to heart failure. They help maintain blood pressure and the balance of fluids in the body when the heart is not able to do so. This test measures the amount of three types of NPs in the blood:  ANP (atrial natriuretic peptide). This is made by the part of the heart that receives blood from the body (atrium).  BNP (B-type natriuretic peptide). This is made by the heart's main pumping chamber (left ventricle).  CNP (C-type natriuretic peptide). This is made by the lining of the blood vessels (endothelial cells).  What kind of sample is taken?    A blood sample is required for this test. It is usually collected by inserting a needle into a blood vessel.  Tell a health care provider about:  Any allergies you have.  All medicines you are taking, including vitamins, herbs, eye drops, creams, and over-the-counter medicines.  Any surgeries you have had.  Any medical conditions you have.  Whether you are pregnant or may be pregnant.  How are  the results reported?  Your test results will be reported as values for each type of NP. Your health care provider will compare your results to normal ranges that were established after testing a large group of people (reference ranges). Reference ranges may vary among labs and hospitals. For this test, common normal reference ranges are:  ANP: 22-77 pg/mL or 22-77 ng/L (SI units).  BNP: 0-100 pg/mL or 0-100 ng/L (SI units).  CNP: These values are yet to be determined.  What do the results mean?  Results that are within the reference ranges are considered normal. These results may mean that:  You do not have heart failure.  You have heart failure or a different heart disease, and your treatment is working effectively.  Results that are higher than the reference ranges may mean that:  You have heart failure.  You are having a heart attack.  You have high blood pressure (hypertension).  Your body is rejecting your transplanted heart.  Talk with your health care provider about what your results mean.  Questions to ask your health care provider  Ask your health care provider, or the department that is doing the test:  When will my results be ready?  How will I get my results?  What are my treatment options?  What other tests do I need?  What are my next steps?  Summary  The natriuretic peptides test helps diagnose heart failure and other heart abnormalities.  Natriuretic peptides (NPs) are hormones that the heart cells make in response to heart failure. They help maintain blood pressure and the balance of fluids in the body when the heart is not able to do so.  You may have this test if you have symptoms that may be caused by heart failure or a heart attack, such as shortness of breath or fatigue.  This information is not intended to replace advice given to you by your health care provider. Make sure you discuss any questions you have with your health care provider.  Document Revised: 11/30/2018 Document Reviewed:  08/28/2018  [employer] Patient Education © 2020 [employer]  · A thin, flexible tube (catheter) is passed into the right side of the heart and into the main artery that carries blood from your heart to your lungs (pulmonary artery).  This information is not intended to replace advice given to you by your health care provider. Make sure you discuss any questions you have with your health care provider.  Document Revised: 02/21/2019 Document Reviewed: 01/23/2019  [employer] Patient Education © 2020 [employer]

## 2021-02-09 NOTE — PROGRESS NOTES
Right heart cath scheduled for Tuesday February 23rd. Covid test will be Saturday February 20th between 9-10. Patient made aware via phone. All instructions sent via mail service and she was advised to call me once she receives and reviews them to discuss

## 2021-02-10 ENCOUNTER — DOCUMENTATION (OUTPATIENT)
Dept: CARDIOLOGY | Facility: CLINIC | Age: 71
End: 2021-02-10

## 2021-02-10 LAB
QT INTERVAL: 378 MS
QTC INTERVAL: 408 MS

## 2021-02-10 NOTE — PROGRESS NOTES
Maximiliano Saravia MD PhD  Deanna Sullivan RN             Please let her know that I looked at her chest x-ray and it is normal.      Patient notified

## 2021-02-20 ENCOUNTER — LAB (OUTPATIENT)
Dept: LAB | Facility: HOSPITAL | Age: 71
End: 2021-02-20

## 2021-02-20 DIAGNOSIS — Z01.818 PREOP TESTING: Primary | ICD-10-CM

## 2021-02-20 PROCEDURE — U0005 INFEC AGEN DETEC AMPLI PROBE: HCPCS

## 2021-02-20 PROCEDURE — U0004 COV-19 TEST NON-CDC HGH THRU: HCPCS

## 2021-02-20 PROCEDURE — C9803 HOPD COVID-19 SPEC COLLECT: HCPCS

## 2021-02-21 LAB — SARS-COV-2 ORF1AB RESP QL NAA+PROBE: NOT DETECTED

## 2021-02-23 ENCOUNTER — HOSPITAL ENCOUNTER (OUTPATIENT)
Facility: HOSPITAL | Age: 71
Setting detail: HOSPITAL OUTPATIENT SURGERY
Discharge: HOME | End: 2021-02-23
Attending: INTERNAL MEDICINE | Admitting: INTERNAL MEDICINE

## 2021-02-23 VITALS
TEMPERATURE: 96.8 F | OXYGEN SATURATION: 98 % | DIASTOLIC BLOOD PRESSURE: 59 MMHG | BODY MASS INDEX: 39.82 KG/M2 | HEART RATE: 64 BPM | SYSTOLIC BLOOD PRESSURE: 134 MMHG | HEIGHT: 64 IN | RESPIRATION RATE: 18 BRPM | WEIGHT: 233.25 LBS

## 2021-02-23 DIAGNOSIS — I27.20 PULMONARY HYPERTENSION (HCC): ICD-10-CM

## 2021-02-23 PROBLEM — I11.0 HEART FAILURE DUE TO HIGH BLOOD PRESSURE (HCC): Status: ACTIVE | Noted: 2021-02-23

## 2021-02-23 LAB — OXYGEN SATURATION, PULMONARY ARTERY: 65.2 %

## 2021-02-23 PROCEDURE — 82810 BLOOD GASES O2 SAT ONLY: CPT | Performed by: INTERNAL MEDICINE

## 2021-02-23 PROCEDURE — 25010000002 FUROSEMIDE PER 20 MG: Performed by: INTERNAL MEDICINE

## 2021-02-23 PROCEDURE — 25010000002 HEPARIN (PORCINE) PER 1000 UNITS: Performed by: INTERNAL MEDICINE

## 2021-02-23 PROCEDURE — C1894 INTRO/SHEATH, NON-LASER: HCPCS | Performed by: INTERNAL MEDICINE

## 2021-02-23 PROCEDURE — C1769 GUIDE WIRE: HCPCS | Performed by: INTERNAL MEDICINE

## 2021-02-23 PROCEDURE — 93451 RIGHT HEART CATH: CPT | Performed by: INTERNAL MEDICINE

## 2021-02-23 RX ORDER — POTASSIUM CHLORIDE 1500 MG/1
20 TABLET, FILM COATED, EXTENDED RELEASE ORAL 2 TIMES DAILY
Qty: 60 TABLET | Refills: 6 | Status: SHIPPED | OUTPATIENT
Start: 2021-02-23 | End: 2022-09-29 | Stop reason: HOSPADM

## 2021-02-23 RX ORDER — SODIUM CHLORIDE 0.9 % (FLUSH) 0.9 %
3 SYRINGE (ML) INJECTION EVERY 12 HOURS SCHEDULED
Status: DISCONTINUED | OUTPATIENT
Start: 2021-02-23 | End: 2021-02-23 | Stop reason: HOSPADM

## 2021-02-23 RX ORDER — CHLORTHALIDONE 25 MG/1
25 TABLET ORAL DAILY
Qty: 30 TABLET | Refills: 5 | Status: SHIPPED | OUTPATIENT
Start: 2021-02-23 | End: 2021-08-25

## 2021-02-23 RX ORDER — SODIUM CHLORIDE 0.9 % (FLUSH) 0.9 %
10 SYRINGE (ML) INJECTION AS NEEDED
Status: DISCONTINUED | OUTPATIENT
Start: 2021-02-23 | End: 2021-02-23 | Stop reason: HOSPADM

## 2021-02-23 RX ORDER — LIDOCAINE HYDROCHLORIDE 20 MG/ML
INJECTION, SOLUTION INFILTRATION; PERINEURAL AS NEEDED
Status: DISCONTINUED | OUTPATIENT
Start: 2021-02-23 | End: 2021-02-23 | Stop reason: HOSPADM

## 2021-02-23 RX ORDER — FUROSEMIDE 10 MG/ML
INJECTION INTRAMUSCULAR; INTRAVENOUS AS NEEDED
Status: DISCONTINUED | OUTPATIENT
Start: 2021-02-23 | End: 2021-02-23 | Stop reason: HOSPADM

## 2021-02-26 ENCOUNTER — OFFICE VISIT (OUTPATIENT)
Dept: CARDIOLOGY | Facility: CLINIC | Age: 71
End: 2021-02-26

## 2021-02-26 ENCOUNTER — LAB (OUTPATIENT)
Dept: LAB | Facility: HOSPITAL | Age: 71
End: 2021-02-26

## 2021-02-26 VITALS
OXYGEN SATURATION: 98 % | SYSTOLIC BLOOD PRESSURE: 108 MMHG | HEART RATE: 70 BPM | WEIGHT: 229.9 LBS | BODY MASS INDEX: 39.25 KG/M2 | DIASTOLIC BLOOD PRESSURE: 74 MMHG | HEIGHT: 64 IN

## 2021-02-26 DIAGNOSIS — I36.1 NON-RHEUMATIC TRICUSPID VALVE INSUFFICIENCY: ICD-10-CM

## 2021-02-26 DIAGNOSIS — I34.0 NON-RHEUMATIC MITRAL REGURGITATION: ICD-10-CM

## 2021-02-26 DIAGNOSIS — I27.29 PULMONARY HYPERTENSIVE VENOUS DISEASE (HCC): Primary | ICD-10-CM

## 2021-02-26 DIAGNOSIS — E66.09 CLASS 2 OBESITY DUE TO EXCESS CALORIES WITHOUT SERIOUS COMORBIDITY WITH BODY MASS INDEX (BMI) OF 39.0 TO 39.9 IN ADULT: ICD-10-CM

## 2021-02-26 DIAGNOSIS — I11.0 HEART FAILURE DUE TO HIGH BLOOD PRESSURE (HCC): ICD-10-CM

## 2021-02-26 DIAGNOSIS — I35.1 NONRHEUMATIC AORTIC VALVE INSUFFICIENCY: ICD-10-CM

## 2021-02-26 LAB
ANION GAP SERPL CALCULATED.3IONS-SCNC: 10 MMOL/L (ref 5–15)
BUN SERPL-MCNC: 33 MG/DL (ref 8–23)
BUN/CREAT SERPL: 23.6 (ref 7–25)
CALCIUM SPEC-SCNC: 10.4 MG/DL (ref 8.6–10.5)
CHLORIDE SERPL-SCNC: 101 MMOL/L (ref 98–107)
CO2 SERPL-SCNC: 28 MMOL/L (ref 22–29)
CREAT SERPL-MCNC: 1.4 MG/DL (ref 0.57–1)
GFR SERPL CREATININE-BSD FRML MDRD: 45 ML/MIN/1.73
GLUCOSE SERPL-MCNC: 145 MG/DL (ref 65–99)
POTASSIUM SERPL-SCNC: 3.8 MMOL/L (ref 3.5–5.2)
SODIUM SERPL-SCNC: 139 MMOL/L (ref 136–145)

## 2021-02-26 PROCEDURE — 36415 COLL VENOUS BLD VENIPUNCTURE: CPT | Performed by: INTERNAL MEDICINE

## 2021-02-26 PROCEDURE — 80048 BASIC METABOLIC PNL TOTAL CA: CPT | Performed by: INTERNAL MEDICINE

## 2021-02-26 PROCEDURE — 99214 OFFICE O/P EST MOD 30 MIN: CPT | Performed by: INTERNAL MEDICINE

## 2021-02-26 NOTE — PROGRESS NOTES
Cardiovascular Medicine      Maximiliano Saravia M.D., Ph.D., [facility]           History of Present Illness      1.PAH  AO pressures (S/D/M) : 145/81/97 mmHg  RA pressures  (A/V/M) : 4/2/2 mmHg  RV pressures (S/D/E) : 45/-2/4 mmHg  PA pressures (S/D/M) : 45/9/25 mmHg  PCW pressures (A/V/M) : 11/7/11 mmHg  Aortic O2 saturation: 99%  Cardiac output (Saad method): 5.05 L/min  Cardiac index (Saad method): 2.42 L/min/m2  Cardiac output (thermodilution): 3.62 L/min  Cardiac index (thermodilution): 1.73 L/min/m2  Pulmonary vascular resistance: 309 dynes/sec/cm-5  Systemic vascular resistance: 2099 dynes/sec/cm-5  A. PFTs 2020, restrictive  -FEV1/FVC 99% of predicted  -FVC 62% of predicted  -TLC 76% of predicted  -DLCO 45% of predicted  2. MR  3. TR  4.AI  5.  Risks: HTN, HLD, DM2 , Obese, Snores    Bacilio Shaffer is a 70 y.o. female returns to clinic for pulmonary arterial hypertension.      Patient returns today in follow-up.  She is followed for pulmonary hypertension.  She declined invasive evaluation.  Working diagnosis was likely from worsening mitral regurgitation.  She was agreeable to having PFTs performed.  They were consistent with restrictive physiology.  The FEV1/FVC was 99% of predicted.  The patient's FVC was low at 62% of predicted with TLC 76 of predicted.  The DLCO was also low at 45% of predicted.  Chest x-ray showed no evidence of ILD.  However, she has not had any CT imaging.    She has been followed for multi valvular heart disease.  She has mitral, tricuspid and aortic valve disease.  In 2020 she developed pulmonary pressures in the low 40s.  At that time her mitral regurgitation had worsened to moderate.  LV diameters were normal.  Right ventricular status was normal.    At her last appointment she was complaining of worsening exercise intolerance and severe exertional dyspnea.  She started having increasing lower extremity edema.  She also was endorsing snoring and EDS.  I referred her to sleep  medicine and sent her for RHC.  RHC was consistent with pulmonary hypertension.  The patient's PA pressure was 45/9 with a mean of 25.  Wedge was 11, but did increase to 15 with volume loading.  Additionally, the RA pressure was 2.  RV pressure was 45/4.  PVR was elevated at 309.  The patient was diagnosed with secondary pulmonary hypertension and heart failure with preserved ejection fraction.  She was hypertensive during the case.  She was continued on furosemide, but had the addition of chlorthalidone.  She returns today in follow-up after starting chlorthalidone.    She reports she feels much better. No LE edema. She is completing ADLs. She reports no dyspnea yesterday. She now has an appt with sleep medicine.       Review of Systems   Cardiovascular: Positive for dyspnea on exertion. Negative for chest pain, claudication, cyanosis, irregular heartbeat, leg swelling, near-syncope, orthopnea, palpitations, paroxysmal nocturnal dyspnea and syncope.   Respiratory: Positive for shortness of breath and snoring. Negative for cough, hemoptysis, sleep disturbances due to breathing, sputum production and wheezing.    All other systems reviewed and are negative.      family history includes Diabetes in an other family member; Heart disease in an other family member; Hypertension in an other family member; Stroke in an other family member.     reports that she has never smoked. She has never used smokeless tobacco. Drug use questions deferred to the physician. She reports that she does not drink alcohol.    Allergies   Allergen Reactions   • Atenolol Nausea And Vomiting   • Lortab [Hydrocodone-Acetaminophen] Mental Status Change         Current Outpatient Medications:   •  acetaminophen (TYLENOL 8 HOUR) 650 MG 8 hr tablet, Take 650 mg by mouth Every 8 (Eight) Hours As Needed for Mild Pain ., Disp: , Rfl:   •  amLODIPine (NORVASC) 10 MG tablet, As Needed., Disp: , Rfl:   •  aspirin 81 MG EC tablet, Take 81 mg by mouth  Daily., Disp: , Rfl:   •  atorvastatin (LIPITOR) 20 MG tablet, 40 mg Daily., Disp: , Rfl:   •  carvedilol (COREG) 6.25 MG tablet, 25 mg 2 (Two) Times a Day., Disp: , Rfl:   •  chlorthalidone (HYGROTON) 25 MG tablet, Take 1 tablet by mouth Daily., Disp: 30 tablet, Rfl: 5  •  ferrous sulfate 325 (65 FE) MG tablet, Daily., Disp: , Rfl:   •  furosemide (LASIX) 20 MG tablet, 20 mg As Needed., Disp: , Rfl:   •  gabapentin (NEURONTIN) 600 MG tablet, Take 300 mg by mouth Daily As Needed. prn, Disp: , Rfl:   •  metFORMIN (GLUCOPHAGE) 500 MG tablet, Take 500 mg by mouth 2 (Two) Times a Day With Meals., Disp: , Rfl:   •  Multiple Vitamins-Minerals (MULTIVITAMIN WITH MINERALS) tablet tablet, Take 1 tablet by mouth Daily., Disp: , Rfl:   •  potassium chloride (K-TAB) 20 MEQ tablet controlled-release ER tablet, Take 1 tablet by mouth 2 (Two) Times a Day., Disp: 60 tablet, Rfl: 6  •  Prenatal Vit-Fe Fumarate-FA (PRENATAL, CLASSIC, VITAMIN) 28-0.8 MG tablet tablet, Take  by mouth Daily., Disp: , Rfl:   •  vitamin D (ERGOCALCIFEROL) 1.25 MG (55026 UT) capsule capsule, Take 50,000 Units by mouth 1 (One) Time Per Week., Disp: , Rfl:     Physical Exam:  Vitals:    02/26/21 0853   BP: 108/74   Pulse: 70   SpO2: 98%     Body mass index is 39.46 kg/m².    GEN: alert, appears stated age and cooperative  Body Habitus: overweight  HEENT: Head: Normocephalic, no lesions, without obvious abnormality.  JVP: 6 cm of water at 45 degrees HJR: absent      Heart rate: normal  Heart Rhythm: regular     Heart Sounds: S1: normal intensity  S2: normal intensity  S3: absent   S4: absent  Opening Snap: absent  A2-OS:  N/A  Pericardial rub: absent    Ejection click: None      Murmurs: Systolic: I/VI GREGORY at RSB  Diastolic: none  Extremity: Moves spontaneously    DATA REVIEWED:       I interpreted the EKG today.  Normal sinus rhythm with sinus arrhythmia.  Axis is normal and -2 degrees.  When compared to an EKG dated March 6, 2020, premature atrial complexes  are no longer present.      Results for orders placed in visit on 01/10/20   Adult Stress Echo W/ Cont or Stress Agent if Necessary Per Protocol    Narrative · Left ventricular wall thickness is consistent with severe concentric   hypertrophy.  · Left ventricular systolic function is low normal at 51-55%.  · Stress EKG with target heart rate achieved. No evidence of ischemia.  · Some echocardiographic stress imaging was sub-target and decreases the   sensitivity and specificity of the test.  · No evidence of inducible ischemia at sub-target (minimum, 73%) heart   rate. Sensitivity and specificity of the test is decreased in this   scenario.        Stress echocardiogram showed no evidence of inducible ischemia at subtarget heart rate.  This was at 73% of predicted.      Assessment/Plan      1. Pulmonary hypertension (CMS/HCC).  PAH/PVH. WHO Group 2/3; FC: III.  RV status: Normal.  PFTs: The degree of obstruction is mild with FEV1 of 56 % predicted.. MPAP: 25. Kris: 18.  The patient was diagnosed with secondary PH due to HFPEF, restrictive lung physiology and likely RAYMOND.     Today, the Patient appears euvolemic.. Perfusion status: good.  The patient's PA pressures are in proportion to the known disease. There is not evidence of decompensation.   -Discussed evaluation, treatment and usual course. All questions were answered.  -There is nocurrent indication for PAH-specific medications.  -There is no compelling indication at this time for RHC.  -Recommended we continue active clinical surveillance.  Signs and symptoms of worsening PAH and RV failure were discussed. Hand-out was provided in discharge paperwork.  -I have asked the patient that if they were to move to be certain they see someone with expertise in PAH. These providers can be located at [email or web address].    2. HFpEF. NYHA stage C; Functional class NYHA Functional Class: III. NYHA change: Nyha change: improved by one grades.   CHF volume status: Patient  appears euvolemic.; Perfusion status: good. There is not evidence of decompensation. The patient has/does not have: does not have recent ED visits or admissions specifically for ADHF.   -Disease process and medications discussed. Questions answered fully.  Emphasized salt restriction.  Encouraged daily monitoring of the patient's weight.  Encouraged regular exercise.  -meds; changes chf: No changes to medication therapy.  -BMP today since starting Chlorthalidone    3. Nonrheumatic aortic valve insufficiency/Non-rheumatic mitral regurgitation/Non-rheumatic tricuspid valve insufficiency. ACC stage B. There are no surgical indications at this time. Signs and symptoms of worsening valve disease discussed.  I've asked the patient contact me for an earlier appointment if these develop. The patient has been advised to remain in clinical surveillance.     • I've recommended a repeat 2D TTE every 1 year with the next TTE due: 2021.    • No indication based on 2017 ACC/AHA guidelines for IE prophylaxis for dental procedures: Optimal oral health is recommended through regular professional dental care and the use of appropriate dental products, such as manual, powered, and ultrasonic toothbrushes; dental floss; and other plaque-removal devices    4. Restrictive lung disease. Obesity is associated with decreases in lung volumes.  It is also associated with lowering of FEV1, FVC and FRC.  In some patients, particularly those with BMI greater than 40, there can be a decrease in RV and TLC.  Excessive weight on the chest wall can increase airway resistance and work of breathing due to lower chest wall compliance and respiratory muscle endurance.  While obesity can certainly lead to restrictive lung disease it is important to exclude ILD. CT chest has not been performed.  Respiratory muscle function also deteriorates and obesity in a pattern similar to other chronic respiratory diseases.  -Encouraged caloric restriction, aerobic  exercise  -PFTs    5. Snores. Concerns for sleep disordered breathing. There is concern for RAYMOND. STOP-Bang screening questionnaire was performed and yielded Moderate Risk . No prior nocturnal polysomnography. The risks of untreated RAYMOND were explained, as well as the need for sleep study.   -Avoid ETOH, weight reduction  -Treatment of HTN, per PCP  -1. Referral to Pulmonology for sleep study     Return in about 3 months (around 5/26/2021).

## 2021-03-05 ENCOUNTER — OFFICE VISIT (OUTPATIENT)
Dept: SLEEP MEDICINE | Facility: HOSPITAL | Age: 71
End: 2021-03-05

## 2021-03-05 VITALS
SYSTOLIC BLOOD PRESSURE: 147 MMHG | HEART RATE: 84 BPM | DIASTOLIC BLOOD PRESSURE: 85 MMHG | OXYGEN SATURATION: 98 % | HEIGHT: 64 IN | WEIGHT: 229 LBS | BODY MASS INDEX: 39.09 KG/M2

## 2021-03-05 DIAGNOSIS — I27.20 PULMONARY HYPERTENSION (HCC): ICD-10-CM

## 2021-03-05 DIAGNOSIS — J98.4 RESTRICTIVE LUNG DISEASE: ICD-10-CM

## 2021-03-05 DIAGNOSIS — G25.81 RESTLESS LEGS SYNDROME (RLS): ICD-10-CM

## 2021-03-05 DIAGNOSIS — I50.32 CHRONIC HEART FAILURE WITH PRESERVED EJECTION FRACTION (HCC): ICD-10-CM

## 2021-03-05 DIAGNOSIS — R06.83 SNORING: ICD-10-CM

## 2021-03-05 DIAGNOSIS — G47.19 EXCESSIVE DAYTIME SLEEPINESS: Primary | ICD-10-CM

## 2021-03-05 PROCEDURE — 99214 OFFICE O/P EST MOD 30 MIN: CPT | Performed by: NURSE PRACTITIONER

## 2021-03-05 RX ORDER — POTASSIUM CHLORIDE 20 MEQ/1
20 TABLET, EXTENDED RELEASE ORAL 2 TIMES DAILY
COMMUNITY
Start: 2021-02-23 | End: 2021-06-11 | Stop reason: SDUPTHER

## 2021-03-05 NOTE — PROGRESS NOTES
"New Patient Sleep Medicine Consultation    Encounter Date: 3/5/2021         Patient's Primary Care Provider: Elizabeth Church MD  Referring Provider: Maximiliano Saravia,*  Reason for consultation/chief complaint: snoring, awakening gasping for breath, excessive daytime sleepiness and unrefreshing sleep    Bacilio Shaffer is a 70 y.o. female who admits to snoring, unrestful sleep, High blod pressure, decreased libido, excessive daytime sleepiness, morning headaches, stop breathing during sleep, irritability, Disturbed or restless sleep, Up to the bathroom at night, night sweats and restless legs at night.     She denies cataplexy, sleep paralysis, or hypnagogic hallucinations. Her bedtime is ~ 2200. She  falls asleep after 30 minutes, and is up 4 times per night. She wakes up ~ 0500. She endorses 7 hours of sleep. She drinks 0 cups of coffee, 2 teas, and 0 sodas per day. She drinks 0 alcoholic beverages per week. She is not a current smoker. She does not take sedatives or hypnotics. She has no sleepiness with driving. She naps every day in the chair.     Of note, patient has a significant medical history of multivalvular heart disease, heart failure with preserved ejection fraction, restrictive lung disease, and pulmonary hypertension. She underwent a right heart catheterization on 02/23/2021, revealing (from Cardiologist Dr. Saravia's most recent office note):    \"RHC was consistent with pulmonary hypertension.  The patient's PA pressure was 45/9 with a mean of 25.  Wedge was 11, but did increase to 15 with volume loading. Additionally, the RA pressure was 2.  RV pressure was 45/4.  PVR was elevated at 309. \"    Most recent echocardiogram on 01/21/2020:    Interpretation Summary  · Left ventricular wall thickness is consistent with severe concentric hypertrophy.  · Left ventricular systolic function is low normal at 51-55%.  · Stress EKG with target heart rate achieved. No evidence of ischemia.  · Some " echocardiographic stress imaging was sub-target and decreases the sensitivity and specificity of the test.  · No evidence of inducible ischemia at sub-target (minimum, 73%) heart rate. Sensitivity and specificity of the test is decreased in this scenario.       Oscar - 19    Prior Sleep Testing: None    Past Medical History:   Diagnosis Date   • Carpal tunnel syndrome    • Diabetes mellitus (CMS/HCC)    • Diabetic neuropathy (CMS/HCC)    • Female stress incontinence    • History of colonoscopy 2007   • History of mammogram 05/2011   • Hyperlipidemia    • Hypertension    • Murmur, cardiac    • Sciatica of left side    • Vitamin D deficiency      Social History     Socioeconomic History   • Marital status: Single     Spouse name: Not on file   • Number of children: Not on file   • Years of education: Not on file   • Highest education level: Not on file   Tobacco Use   • Smoking status: Never Smoker   • Smokeless tobacco: Never Used   Substance and Sexual Activity   • Alcohol use: No   • Drug use: Defer   • Sexual activity: Defer     Family History   Problem Relation Age of Onset   • Diabetes Other    • Heart disease Other    • Hypertension Other    • Stroke Other      Prior T&A, UPPP, maxillofacial, or bariatric surgery: None  Family history of sleep disorders: Daughter - RAYMOND on CPAP  Other family history + for: As above  Occupation: Retired from factory work  Marital status: Unmarried  Children: 3  Has 2 brothers and 4 sisters  Smoking history: smoked never      Review of Systems:  Constitutional: positive for fatigue  Eyes: negative  Ears, nose, mouth, throat, and face: negative  Respiratory: positive for dyspnea on exertion  Cardiovascular: positive for dyspnea, fatigue, irregular heart beat, lower extremity edema and palpitations  Gastrointestinal: negative  Genitourinary:negative  Integument/breast: negative  Hematologic/lymphatic: negative  Musculoskeletal:negative  Neurological: negative  Behavioral/Psych:  "negative  Endocrine: negative  Allergic/Immunologic: negative   Patient advised to discuss any positive ROS with PCP.      Vitals:    03/05/21 1051   BP: 147/85   Pulse: 84   SpO2: 98%           03/05/21  1051   Weight: 104 kg (229 lb)       Body mass index is 39.29 kg/m². Patient's Body mass index is 39.29 kg/m². BMI is above normal parameters. Recommendations include: referral to primary care.      Physical Exam:        General: Alert. Cooperative. Well developed. No acute distress.   Head/Neck:  Normocephalic. Symmetrical. Atraumatic.     Neck circumference: 15\"             Eyes: Sclera clear. No icterus. PERRLA. Normal EOM.             Ears: No deformities. Normal hearing.             Nose: No septal deviation. No drainage.          Throat: No oral lesions. No thrush. Moist mucous membranes. Trachea midline    Tongue is normal     Dentition is good       Pharynx: Posterior pharyngeal pillars are narrow    Mallampati score of III (soft and hard palate and base of uvula visible)    Pharynx is normal with unrermarkable tonsils   Chest Wall:  Normal shape. Symmetric expansion with respiration. No tenderness.          Lungs:  Clear to auscultation bilaterally. No wheezes. No rhonchi. No rales. Respirations regular, even and unlabored.            Heart:  Regular rhythm and normal rate. Normal S1 and S2. No murmur.     Abdomen:  Soft, non-tender and non-distended. Normal bowel sounds. No masses.  Extremities:  Moves all extremities well. No edema.           Pulses: Pulses palpable and equal bilaterally.               Skin: Dry. Intact. No bleeding. No rash.           Neuro: Moves all 4 extremities and cranial nerves grossly intact.  Psychiatric: Normal mood and affect.      Current Outpatient Medications:   •  acetaminophen (TYLENOL 8 HOUR) 650 MG 8 hr tablet, Take 650 mg by mouth Every 8 (Eight) Hours As Needed for Mild Pain ., Disp: , Rfl:   •  aspirin 81 MG EC tablet, Take 81 mg by mouth Daily., Disp: , Rfl:   •  " atorvastatin (LIPITOR) 20 MG tablet, 40 mg Daily., Disp: , Rfl:   •  carvedilol (COREG) 6.25 MG tablet, 25 mg 2 (Two) Times a Day., Disp: , Rfl:   •  chlorthalidone (HYGROTON) 25 MG tablet, Take 1 tablet by mouth Daily., Disp: 30 tablet, Rfl: 5  •  ferrous sulfate 325 (65 FE) MG tablet, Daily., Disp: , Rfl:   •  furosemide (LASIX) 20 MG tablet, 20 mg As Needed., Disp: , Rfl:   •  gabapentin (NEURONTIN) 600 MG tablet, Take 300 mg by mouth Daily As Needed. prn, Disp: , Rfl:   •  metFORMIN (GLUCOPHAGE) 500 MG tablet, Take 500 mg by mouth 2 (Two) Times a Day With Meals., Disp: , Rfl:   •  Multiple Vitamins-Minerals (MULTIVITAMIN WITH MINERALS) tablet tablet, Take 1 tablet by mouth Daily., Disp: , Rfl:   •  potassium chloride (K-DUR,KLOR-CON) 20 MEQ CR tablet, Take 20 mEq by mouth 2 (Two) Times a Day., Disp: , Rfl:   •  potassium chloride (K-TAB) 20 MEQ tablet controlled-release ER tablet, Take 1 tablet by mouth 2 (Two) Times a Day., Disp: 60 tablet, Rfl: 6  •  vitamin D (ERGOCALCIFEROL) 1.25 MG (18065 UT) capsule capsule, Take 50,000 Units by mouth 1 (One) Time Per Week., Disp: , Rfl:   •  amLODIPine (NORVASC) 10 MG tablet, As Needed., Disp: , Rfl:   •  Prenatal Vit-Fe Fumarate-FA (PRENATAL, CLASSIC, VITAMIN) 28-0.8 MG tablet tablet, Take  by mouth Daily., Disp: , Rfl:     WBC   Date Value Ref Range Status   02/09/2021 9.15 3.40 - 10.80 10*3/mm3 Final     RBC   Date Value Ref Range Status   02/09/2021 3.82 3.77 - 5.28 10*6/mm3 Final     Hemoglobin   Date Value Ref Range Status   02/09/2021 11.9 (L) 12.0 - 15.9 g/dL Final     Hematocrit   Date Value Ref Range Status   02/09/2021 35.7 34.0 - 46.6 % Final     MCV   Date Value Ref Range Status   02/09/2021 93.5 79.0 - 97.0 fL Final     MCH   Date Value Ref Range Status   02/09/2021 31.2 26.6 - 33.0 pg Final     MCHC   Date Value Ref Range Status   02/09/2021 33.3 31.5 - 35.7 g/dL Final     RDW   Date Value Ref Range Status   02/09/2021 11.7 (L) 12.3 - 15.4 % Final      RDW-SD   Date Value Ref Range Status   02/09/2021 39.7 37.0 - 54.0 fl Final     MPV   Date Value Ref Range Status   02/09/2021 12.1 (H) 6.0 - 12.0 fL Final     Platelets   Date Value Ref Range Status   02/09/2021 279 140 - 450 10*3/mm3 Final     Neutrophil %   Date Value Ref Range Status   03/06/2020 54.0 42.7 - 76.0 % Final     Lymphocyte %   Date Value Ref Range Status   03/06/2020 33.2 19.6 - 45.3 % Final     Monocyte %   Date Value Ref Range Status   03/06/2020 8.0 5.0 - 12.0 % Final     Eosinophil %   Date Value Ref Range Status   03/06/2020 4.2 0.3 - 6.2 % Final     Basophil %   Date Value Ref Range Status   03/06/2020 0.3 0.0 - 1.5 % Final     Immature Grans %   Date Value Ref Range Status   03/06/2020 0.3 0.0 - 0.5 % Final     Neutrophils, Absolute   Date Value Ref Range Status   03/06/2020 3.70 1.70 - 7.00 10*3/mm3 Final     Lymphocytes, Absolute   Date Value Ref Range Status   03/06/2020 2.28 0.70 - 3.10 10*3/mm3 Final     Monocytes, Absolute   Date Value Ref Range Status   03/06/2020 0.55 0.10 - 0.90 10*3/mm3 Final     Eosinophils, Absolute   Date Value Ref Range Status   03/06/2020 0.29 0.00 - 0.40 10*3/mm3 Final     Basophils, Absolute   Date Value Ref Range Status   03/06/2020 0.02 0.00 - 0.20 10*3/mm3 Final     Immature Grans, Absolute   Date Value Ref Range Status   03/06/2020 0.02 0.00 - 0.05 10*3/mm3 Final     nRBC   Date Value Ref Range Status   03/06/2020 0.0 0.0 - 0.2 /100 WBC Final     Lab Results   Component Value Date    GLUCOSE 145 (H) 02/26/2021    BUN 33 (H) 02/26/2021    CREATININE 1.40 (H) 02/26/2021    EGFRIFAFRI 45 (L) 02/26/2021    BCR 23.6 02/26/2021    K 3.8 02/26/2021    CO2 28.0 02/26/2021    CALCIUM 10.4 02/26/2021    ALBUMIN 4.20 02/09/2021    AST 17 02/09/2021    ALT 16 02/09/2021       Contraindications to home sleep test: Moderate or severe congestive heart failure, please note class _III_, restrictive lung disease    ASSESSMENT:  1. Excessive daytime sleepiness, presumed  obstructive sleep apnea - New (to me), additional work-up planned (4)  1. Check diagnostic polysomnography   2. Snoring, presumed obstructive sleep apnea - New (to me), additional work-up planned (4)  1. As above  3. Restless leg syndrome/ Periodic limb movement disorder - (RLS/PLMD) New (to me), additional work-up planned (4)  1. Address after PSG  2. Currently taking ferrous sulfate and gabapentin  4. Restrictive lung disease - New (to me), no additional work-up planned (3)  5. Pulmonary hypertension, HFpEF, multivalvular disease - New (to me), no additional work-up planned (3)    1.   Continue following with Cardiology   6.   Obesity - BMI 39.2 - Stable chronic illness      I spent 35 minutes caring for Bacilio on this date of service. This time includes time spent by me in the following activities: preparing for the visit, reviewing tests, obtaining and/or reviewing a separately obtained history, performing a medically appropriate examination and/or evaluation , counseling and educating the patient/family/caregiver, ordering medications, tests, or procedures and documenting information in the medical record; discussing Further testing    RTC 2 weeks after testing. Patient agrees to return sooner if changes in symptoms.           This document has been electronically signed by BRIANNA Young on March 5, 2021 11:17 CST          CC: Elizabeth Church MD Heatherly, Steven Joel,*

## 2021-03-30 ENCOUNTER — HOSPITAL ENCOUNTER (OUTPATIENT)
Dept: SLEEP MEDICINE | Facility: HOSPITAL | Age: 71
Discharge: HOME | End: 2021-03-30
Admitting: NURSE PRACTITIONER

## 2021-03-30 DIAGNOSIS — R06.83 SNORING: ICD-10-CM

## 2021-03-30 DIAGNOSIS — G25.81 RESTLESS LEGS SYNDROME (RLS): ICD-10-CM

## 2021-03-30 DIAGNOSIS — G47.19 EXCESSIVE DAYTIME SLEEPINESS: ICD-10-CM

## 2021-03-30 PROCEDURE — 95810 POLYSOM 6/> YRS 4/> PARAM: CPT | Performed by: PSYCHIATRY & NEUROLOGY

## 2021-03-30 PROCEDURE — 95810 POLYSOM 6/> YRS 4/> PARAM: CPT

## 2021-04-04 DIAGNOSIS — G47.33 OSA (OBSTRUCTIVE SLEEP APNEA): Primary | ICD-10-CM

## 2021-04-04 DIAGNOSIS — I27.20 PULMONARY HYPERTENSION (HCC): ICD-10-CM

## 2021-04-04 DIAGNOSIS — J98.4 RESTRICTIVE LUNG DISEASE: ICD-10-CM

## 2021-04-04 DIAGNOSIS — I50.32 CHRONIC HEART FAILURE WITH PRESERVED EJECTION FRACTION (HCC): ICD-10-CM

## 2021-04-06 ENCOUNTER — TELEPHONE (OUTPATIENT)
Dept: SLEEP MEDICINE | Facility: HOSPITAL | Age: 71
End: 2021-04-06

## 2021-04-06 NOTE — TELEPHONE ENCOUNTER
Patient called and was given results of sleep study.  Patient understood and agreed to proceed with further testing as ordered by the physician.  In lab titration study was scheduled for may 3,2021 at 9pm with Covid Swab on April 30.

## 2021-04-14 ENCOUNTER — OFFICE VISIT (OUTPATIENT)
Dept: PULMONOLOGY | Facility: CLINIC | Age: 71
End: 2021-04-14

## 2021-04-14 DIAGNOSIS — I27.20 PULMONARY HYPERTENSION (HCC): ICD-10-CM

## 2021-04-14 LAB
BH CV ECHO MEAS - ACS: 1.5 CM
BH CV ECHO MEAS - AI DEC SLOPE: 142 CM/SEC^2
BH CV ECHO MEAS - AI MAX PG: 56 MMHG
BH CV ECHO MEAS - AI MAX VEL: 374 CM/SEC
BH CV ECHO MEAS - AI P1/2T: 771.4 MSEC
BH CV ECHO MEAS - AO MAX PG (FULL): 9.9 MMHG
BH CV ECHO MEAS - AO MAX PG: 15.5 MMHG
BH CV ECHO MEAS - AO MEAN PG (FULL): 6 MMHG
BH CV ECHO MEAS - AO MEAN PG: 9 MMHG
BH CV ECHO MEAS - AO ROOT AREA (BSA CORRECTED): 1.3
BH CV ECHO MEAS - AO ROOT AREA: 5.3 CM^2
BH CV ECHO MEAS - AO ROOT DIAM: 2.6 CM
BH CV ECHO MEAS - AO V2 MAX: 197 CM/SEC
BH CV ECHO MEAS - AO V2 MEAN: 139 CM/SEC
BH CV ECHO MEAS - AO V2 VTI: 35.7 CM
BH CV ECHO MEAS - ASC AORTA: 3.1 CM
BH CV ECHO MEAS - AVA(I,A): 1.8 CM^2
BH CV ECHO MEAS - AVA(I,D): 1.8 CM^2
BH CV ECHO MEAS - AVA(V,A): 1.7 CM^2
BH CV ECHO MEAS - AVA(V,D): 1.7 CM^2
BH CV ECHO MEAS - BSA(HAYCOCK): 2.2 M^2
BH CV ECHO MEAS - BSA: 2.1 M^2
BH CV ECHO MEAS - BZI_BMI: 39.3 KILOGRAMS/M^2
BH CV ECHO MEAS - BZI_METRIC_HEIGHT: 162.6 CM
BH CV ECHO MEAS - BZI_METRIC_WEIGHT: 103.9 KG
BH CV ECHO MEAS - EDV(CUBED): 54.9 ML
BH CV ECHO MEAS - EDV(MOD-SP4): 50 ML
BH CV ECHO MEAS - EDV(TEICH): 62 ML
BH CV ECHO MEAS - EF(CUBED): 64.1 %
BH CV ECHO MEAS - EF(MOD-SP4): 54 %
BH CV ECHO MEAS - EF(TEICH): 56.4 %
BH CV ECHO MEAS - EPSS: 1.1 CM
BH CV ECHO MEAS - ESV(CUBED): 19.7 ML
BH CV ECHO MEAS - ESV(MOD-SP4): 23 ML
BH CV ECHO MEAS - ESV(TEICH): 27 ML
BH CV ECHO MEAS - FS: 28.9 %
BH CV ECHO MEAS - IVS/LVPW: 1.3
BH CV ECHO MEAS - IVSD: 1.9 CM
BH CV ECHO MEAS - LA DIMENSION: 4.3 CM
BH CV ECHO MEAS - LA/AO: 1.7
BH CV ECHO MEAS - LV DIASTOLIC VOL/BSA (35-75): 24.1 ML/M^2
BH CV ECHO MEAS - LV IVRT: 0.06 SEC
BH CV ECHO MEAS - LV MASS(C)D: 265.5 GRAMS
BH CV ECHO MEAS - LV MASS(C)DI: 128.1 GRAMS/M^2
BH CV ECHO MEAS - LV MAX PG: 5.7 MMHG
BH CV ECHO MEAS - LV MEAN PG: 3 MMHG
BH CV ECHO MEAS - LV SYSTOLIC VOL/BSA (12-30): 11.1 ML/M^2
BH CV ECHO MEAS - LV V1 MAX: 119 CM/SEC
BH CV ECHO MEAS - LV V1 MEAN: 80.3 CM/SEC
BH CV ECHO MEAS - LV V1 VTI: 23.1 CM
BH CV ECHO MEAS - LVIDD: 3.8 CM
BH CV ECHO MEAS - LVIDS: 2.7 CM
BH CV ECHO MEAS - LVLD AP4: 7.5 CM
BH CV ECHO MEAS - LVLS AP4: 6.9 CM
BH CV ECHO MEAS - LVOT AREA (M): 2.8 CM^2
BH CV ECHO MEAS - LVOT AREA: 2.8 CM^2
BH CV ECHO MEAS - LVOT DIAM: 1.9 CM
BH CV ECHO MEAS - LVPWD: 1.5 CM
BH CV ECHO MEAS - MR ALIAS VEL: 30.8 CM/SEC
BH CV ECHO MEAS - MR ERO: 0.13 CM^2
BH CV ECHO MEAS - MR FLOW RATE: 48.4 CM^3/SEC
BH CV ECHO MEAS - MR MAX PG: 59.3 MMHG
BH CV ECHO MEAS - MR MAX VEL: 385 CM/SEC
BH CV ECHO MEAS - MR PISA RADIUS: 0.5 CM
BH CV ECHO MEAS - MR PISA: 1.6 CM^2
BH CV ECHO MEAS - MV A MAX VEL: 67.1 CM/SEC
BH CV ECHO MEAS - MV AREA (1 DIAM): 6.2 CM^2
BH CV ECHO MEAS - MV DEC SLOPE: 267 CM/SEC^2
BH CV ECHO MEAS - MV DIAM: 2.8 CM
BH CV ECHO MEAS - MV E MAX VEL: 68.6 CM/SEC
BH CV ECHO MEAS - MV E/A: 1
BH CV ECHO MEAS - MV FLOW AREA(1DIAM): 6.2 CM^2
BH CV ECHO MEAS - MV MAX PG: 2.2 MMHG
BH CV ECHO MEAS - MV MEAN PG: 1 MMHG
BH CV ECHO MEAS - MV P1/2T MAX VEL: 64.4 CM/SEC
BH CV ECHO MEAS - MV P1/2T: 70.6 MSEC
BH CV ECHO MEAS - MV V2 MAX: 73.7 CM/SEC
BH CV ECHO MEAS - MV V2 MEAN: 35.6 CM/SEC
BH CV ECHO MEAS - MV V2 VTI: 22.3 CM
BH CV ECHO MEAS - MVA P1/2T LCG: 3.4 CM^2
BH CV ECHO MEAS - MVA(P1/2T): 3.1 CM^2
BH CV ECHO MEAS - MVA(VTI): 2.9 CM^2
BH CV ECHO MEAS - PA MAX PG (FULL): 1.2 MMHG
BH CV ECHO MEAS - PA MAX PG: 4.5 MMHG
BH CV ECHO MEAS - PA MEAN PG (FULL): 0 MMHG
BH CV ECHO MEAS - PA MEAN PG: 2 MMHG
BH CV ECHO MEAS - PA V2 MAX: 106 CM/SEC
BH CV ECHO MEAS - PA V2 MEAN: 70.4 CM/SEC
BH CV ECHO MEAS - PA V2 VTI: 20.8 CM
BH CV ECHO MEAS - RF(MV,AO)(1 DIAM): -0.38
BH CV ECHO MEAS - RF(MV,LVOT)(1DIAM): 0.52
BH CV ECHO MEAS - RV MAX PG: 3.3 MMHG
BH CV ECHO MEAS - RV MEAN PG: 2 MMHG
BH CV ECHO MEAS - RV V1 MAX: 91 CM/SEC
BH CV ECHO MEAS - RV V1 MEAN: 66.7 CM/SEC
BH CV ECHO MEAS - RV V1 VTI: 20.1 CM
BH CV ECHO MEAS - RVDD: 2.2 CM
BH CV ECHO MEAS - SI(AO): 91.5 ML/M^2
BH CV ECHO MEAS - SI(CUBED): 17 ML/M^2
BH CV ECHO MEAS - SI(LVOT): 31.6 ML/M^2
BH CV ECHO MEAS - SI(MOD-SP4): 13 ML/M^2
BH CV ECHO MEAS - SI(MV 1 DIAM): 66.3 ML/M^2
BH CV ECHO MEAS - SI(TEICH): 16.9 ML/M^2
BH CV ECHO MEAS - SV(AO): 189.5 ML
BH CV ECHO MEAS - SV(CUBED): 35.2 ML
BH CV ECHO MEAS - SV(LVOT): 65.5 ML
BH CV ECHO MEAS - SV(MOD-SP4): 27 ML
BH CV ECHO MEAS - SV(MV 1 DIAM): 137.3 ML
BH CV ECHO MEAS - SV(TEICH): 34.9 ML
BH CV ECHO MEAS - TR MAX VEL: 262 CM/SEC
BH CV VAS BP LEFT ARM: NORMAL MMHG

## 2021-04-14 PROCEDURE — 94010 BREATHING CAPACITY TEST: CPT | Performed by: INTERNAL MEDICINE

## 2021-04-14 PROCEDURE — 94727 GAS DIL/WSHOT DETER LNG VOL: CPT | Performed by: INTERNAL MEDICINE

## 2021-04-14 PROCEDURE — 94729 DIFFUSING CAPACITY: CPT | Performed by: INTERNAL MEDICINE

## 2021-04-14 NOTE — PROGRESS NOTES
Full PFT performed; no post spirometry    Good patient effort and cooperation, however was not able to keep a tight seal on the mouthpiece during FRC and therefore was unable to finish the test.     Ordered by Dr. Saravia, read by Dr. Jeff Stoddard.

## 2021-04-30 ENCOUNTER — LAB (OUTPATIENT)
Dept: LAB | Facility: HOSPITAL | Age: 71
End: 2021-04-30

## 2021-04-30 DIAGNOSIS — I27.20 PULMONARY HYPERTENSION (HCC): ICD-10-CM

## 2021-04-30 DIAGNOSIS — I50.32 CHRONIC HEART FAILURE WITH PRESERVED EJECTION FRACTION (HCC): ICD-10-CM

## 2021-04-30 DIAGNOSIS — G47.33 OSA (OBSTRUCTIVE SLEEP APNEA): ICD-10-CM

## 2021-04-30 DIAGNOSIS — J98.4 RESTRICTIVE LUNG DISEASE: ICD-10-CM

## 2021-04-30 LAB — SARS-COV-2 N GENE RESP QL NAA+PROBE: NOT DETECTED

## 2021-04-30 PROCEDURE — C9803 HOPD COVID-19 SPEC COLLECT: HCPCS

## 2021-04-30 PROCEDURE — 87635 SARS-COV-2 COVID-19 AMP PRB: CPT

## 2021-05-03 ENCOUNTER — HOSPITAL ENCOUNTER (OUTPATIENT)
Dept: SLEEP MEDICINE | Facility: HOSPITAL | Age: 71
Discharge: HOME | End: 2021-05-03
Admitting: PSYCHIATRY & NEUROLOGY

## 2021-05-03 DIAGNOSIS — I50.32 CHRONIC HEART FAILURE WITH PRESERVED EJECTION FRACTION (HCC): ICD-10-CM

## 2021-05-03 DIAGNOSIS — I27.20 PULMONARY HYPERTENSION (HCC): ICD-10-CM

## 2021-05-03 DIAGNOSIS — G47.33 OSA (OBSTRUCTIVE SLEEP APNEA): ICD-10-CM

## 2021-05-03 DIAGNOSIS — J98.4 RESTRICTIVE LUNG DISEASE: ICD-10-CM

## 2021-05-03 PROCEDURE — 95811 POLYSOM 6/>YRS CPAP 4/> PARM: CPT

## 2021-05-03 PROCEDURE — 95811 POLYSOM 6/>YRS CPAP 4/> PARM: CPT | Performed by: PSYCHIATRY & NEUROLOGY

## 2021-05-20 DIAGNOSIS — G47.33 OSA (OBSTRUCTIVE SLEEP APNEA): Primary | ICD-10-CM

## 2021-05-28 ENCOUNTER — OFFICE VISIT (OUTPATIENT)
Dept: SLEEP MEDICINE | Facility: HOSPITAL | Age: 71
End: 2021-05-28

## 2021-05-28 VITALS
OXYGEN SATURATION: 98 % | WEIGHT: 229 LBS | SYSTOLIC BLOOD PRESSURE: 126 MMHG | HEIGHT: 64 IN | HEART RATE: 81 BPM | BODY MASS INDEX: 39.09 KG/M2 | DIASTOLIC BLOOD PRESSURE: 83 MMHG

## 2021-05-28 DIAGNOSIS — I27.20 PULMONARY HYPERTENSION (HCC): ICD-10-CM

## 2021-05-28 DIAGNOSIS — I50.32 CHRONIC HEART FAILURE WITH PRESERVED EJECTION FRACTION (HCC): ICD-10-CM

## 2021-05-28 DIAGNOSIS — G25.81 RESTLESS LEGS SYNDROME (RLS): ICD-10-CM

## 2021-05-28 DIAGNOSIS — G47.33 OBSTRUCTIVE SLEEP APNEA, ADULT: Primary | ICD-10-CM

## 2021-05-28 PROCEDURE — 99212 OFFICE O/P EST SF 10 MIN: CPT | Performed by: NURSE PRACTITIONER

## 2021-05-28 RX ORDER — METFORMIN HYDROCHLORIDE 500 MG/1
500 TABLET, EXTENDED RELEASE ORAL 2 TIMES DAILY
COMMUNITY
Start: 2021-04-18 | End: 2021-05-28 | Stop reason: SDUPTHER

## 2021-05-28 RX ORDER — LORATADINE 10 MG/1
10 TABLET ORAL DAILY
COMMUNITY
Start: 2021-04-04 | End: 2021-06-11

## 2021-05-28 RX ORDER — IBUPROFEN 800 MG/1
TABLET ORAL EVERY 8 HOURS SCHEDULED
COMMUNITY
End: 2021-06-11

## 2021-05-28 RX ORDER — PYRAZINAMIDE 500 MG/1
TABLET ORAL
COMMUNITY
End: 2021-06-11

## 2021-05-28 RX ORDER — ATORVASTATIN CALCIUM 40 MG/1
40 TABLET, FILM COATED ORAL EVERY EVENING
COMMUNITY
Start: 2021-04-18

## 2021-05-28 NOTE — PROGRESS NOTES
Sleep Clinic Follow Up    CHIEF COMPLAINT: follow up sleep testing    LAST OV: 03/05/2021    HPI:  The patient is a 70 y.o. female.  She underwent a diagnostic PSG on 03/30/2021 with an AHI of 12, REM AHI of 40, and O2 lesly of 83% with no sustained hypoxia. Given her significant cardiopulmomary history, it was recommended that she undergo an in lab PAP titration study.  We discussed the results of the recent CPAP titration performed on 05/03/2021.  She was titrated on CPAP pressures of 4-14 cm H2O, with ultimate control of respiratory events at 14 cm H2O. The patient had a periodic limb movement index of 3.  The patient did have PAC's and PVC's. No sustained hypoxia.      INTERVAL MEDICAL HISTORY: No change since last office visit in regard to the patient's bedtime routine, medications, or diagnosis.      Review of Systems:  Denies chest pain, shortness of breath, leg swelling, cough, fever, chills, abdominal pain, N/V/D.    MEDICATIONS:   Current Outpatient Medications:   •  acetaminophen (TYLENOL 8 HOUR) 650 MG 8 hr tablet, Take 650 mg by mouth Every 8 (Eight) Hours As Needed for Mild Pain ., Disp: , Rfl:   •  amLODIPine (NORVASC) 10 MG tablet, As Needed., Disp: , Rfl:   •  aspirin 81 MG EC tablet, Take 81 mg by mouth Daily., Disp: , Rfl:   •  atorvastatin (LIPITOR) 20 MG tablet, 40 mg Daily., Disp: , Rfl:   •  carvedilol (COREG) 6.25 MG tablet, 25 mg 2 (Two) Times a Day., Disp: , Rfl:   •  chlorthalidone (HYGROTON) 25 MG tablet, Take 1 tablet by mouth Daily., Disp: 30 tablet, Rfl: 5  •  ferrous sulfate 325 (65 FE) MG tablet, Daily., Disp: , Rfl:   •  furosemide (LASIX) 20 MG tablet, 20 mg As Needed., Disp: , Rfl:   •  gabapentin (NEURONTIN) 600 MG tablet, Take 300 mg by mouth Daily As Needed. prn, Disp: , Rfl:   •  metFORMIN (GLUCOPHAGE) 500 MG tablet, Take 500 mg by mouth 2 (Two) Times a Day With Meals., Disp: , Rfl:   •  Multiple Vitamins-Minerals (MULTIVITAMIN WITH MINERALS) tablet tablet, Take 1 tablet by  mouth Daily., Disp: , Rfl:   •  potassium chloride (K-DUR,KLOR-CON) 20 MEQ CR tablet, Take 20 mEq by mouth 2 (Two) Times a Day., Disp: , Rfl:   •  potassium chloride (K-TAB) 20 MEQ tablet controlled-release ER tablet, Take 1 tablet by mouth 2 (Two) Times a Day., Disp: 60 tablet, Rfl: 6  •  Prenatal Vit-Fe Fumarate-FA (PRENATAL, CLASSIC, VITAMIN) 28-0.8 MG tablet tablet, Take  by mouth Daily., Disp: , Rfl:   •  vitamin D (ERGOCALCIFEROL) 1.25 MG (94740 UT) capsule capsule, Take 50,000 Units by mouth 1 (One) Time Per Week., Disp: , Rfl:     PHYSICAL EXAM:      Vitals:    05/28/21 1026   BP: 126/83   Pulse: 81   SpO2: 98%     Patient's Body mass index is 39.29 kg/m². indicating that she is morbidly obese (BMI > 40 or > 35 with obesity - related health condition). Obesity-related health conditions include the following: obstructive sleep apnea, hypertension, diabetes mellitus and dyslipidemias. Obesity is unchanged. BMI is is above average; BMI management plan is completed. I recommend portion control and increasing exercise.      Gen:                No distress, conversant, pleasant, appears stated age, alert, oriented  Eyes:               Anicteric sclera, moist conjunctiva, no lid lag                           PERRL, EOMI   Heent:             NC/AT                          Oropharynx clear                          Normal hearing  Lungs:             Normal effort, non-labored breathing                          Clear to auscultation bilaterally          CV:                  Normal S1/S2, no murmur                          No lower extremity edema  ABD:               Soft, rounded, non-distended                          Normal bowel sounds                    Psych:             Appropriate affect  Neuro:             CN 2-12 appear intact      Assessment and Plan:    1. Obstructive sleep apnea - Established, not yet controlled  1. The sleep results were discussed in detail with the patient.  The risks of untreated sleep  apnea were reviewed.  Treatment options for sleep apnea were discussed. I counseled patient on sleep hygiene, including regular sleep wake schedule and stimulus control therapy, the importance of weight reduction, and abstaining from smoking and alcohol consumption  2. Start CPAP @ 14 cm H2O as ordered  3. Follow up in 30-90 days with compliance report, or sooner if changes in symptoms in interim period or if trouble with CPAP adherence  2. Restless leg syndrome/PLMD - Established, not yet controlled  1. Address after starting PAP therapy  2. Check iron/ferritin, consider Requip vs. Mirapex if labs within range  3. PAH, HFpEF, multivalvular disease - Established, stable (1)   1. Following with Cardiology      All of the patient's questions were answered. She states understanding and agreement with my assessment and plan as above.     I spent 15 minutes caring for Bacilio on this date of service. This time includes time spent by me in the following activities: preparing for the visit, reviewing tests, obtaining and/or reviewing a separately obtained history, performing a medically appropriate examination and/or evaluation , counseling and educating the patient/family/caregiver, documenting information in the medical record and independently interpreting results and communicating that information with the patient/family/caregiver; discussing PAP therapy, PAP compliance, PAP maintenance and Study results    Patient to follow up in 31-90 days with compliance report. Patient agrees to return sooner if changes in symptoms.           This document has been electronically signed by BRIANNA Young on May 28, 2021 10:28 CDT            CC: Howie Mohamud,           No ref. provider found

## 2021-06-11 ENCOUNTER — OFFICE VISIT (OUTPATIENT)
Dept: CARDIOLOGY | Facility: CLINIC | Age: 71
End: 2021-06-11

## 2021-06-11 VITALS
DIASTOLIC BLOOD PRESSURE: 86 MMHG | SYSTOLIC BLOOD PRESSURE: 142 MMHG | HEIGHT: 64 IN | WEIGHT: 219.6 LBS | BODY MASS INDEX: 37.49 KG/M2 | OXYGEN SATURATION: 97 % | HEART RATE: 83 BPM

## 2021-06-11 DIAGNOSIS — I10 HYPERTENSION, ESSENTIAL: Primary | ICD-10-CM

## 2021-06-11 DIAGNOSIS — E78.2 HYPERLIPEMIA, MIXED: ICD-10-CM

## 2021-06-11 PROCEDURE — 99214 OFFICE O/P EST MOD 30 MIN: CPT | Performed by: INTERNAL MEDICINE

## 2021-06-11 RX ORDER — AMLODIPINE BESYLATE 5 MG/1
5 TABLET ORAL DAILY
Qty: 90 TABLET | Refills: 3 | Status: SHIPPED | OUTPATIENT
Start: 2021-06-11 | End: 2021-06-15 | Stop reason: SDUPTHER

## 2021-06-11 RX ORDER — LANOLIN ALCOHOL/MO/W.PET/CERES
1000 CREAM (GRAM) TOPICAL DAILY
COMMUNITY

## 2021-06-11 NOTE — PROGRESS NOTES
[facility] Cardiology  OFFICE NOTE    Cardiovascular Medicine  Bryant Ramirez M.D., [facility], FSCAI, RPVI         No referring provider defined for this encounter.    Thank you for asking me to see Bacilio Shaffer for shortness of breath.    History of Present Illness  This is a 70 y.o. female with:    1.PAH  A. PFTs 2020, restrictive  -FEV1/FVC 99% of predicted  -FVC 62% of predicted  -TLC 76% of predicted  -DLCO 45% of predicted  2. MR  3. TR  4.AI  5.  Risks: HTN, HLD, DM2 , Obese, Snores    Bacilio Shaffer is a 70 y.o. female who presents for consultation today.  Patient was previously established with Dr. Saravia, for moderate mitral regurgitation otherwise mild aortic and tricuspid regurgitation.  She was also found to have pulmonary pressures in the low 40s in the setting of moderate mitral regurgitation.    She initially preferred noninvasive management.  She did not originally want to see a sleep physician.  it was felt that her pulmonary pressures were likely from her worsening mitral regurgitation, so she was recommended to undergo ongoing surveillance.  She was agreeable to having PFTs performed.  These were performed in 2020 and were consistent with restrictive lung physiology.  Patient's FEV1/FVC was 99% of predicted.  The patient's FVC was low at 62% of predicted with TLC 76% of predicted.  The DLCO was also low at 45% of predicted.  The patient has not had any CT imaging.  She did have a chest x-ray which did not show any evidence of ILD.     Patient also had a stress echo in January 2020 which was an EF of 51 to 55% with no evidence of induced ischemia.  Heart rate was at 73% predicted though.    Patient went a right heart cath I mean PA pressures about 25, cardiac output and cardiac index normal, pulmonary capillary wedge pressure is 11.  MS was 3.8 Wood minutes.  She has not started on any specific therapies.    Patient has done well since last visit.  Denying any worsening  shortness of breath or chest pain.      Review of Systems - ROS  Constitution: Negative for weakness, weight gain and weight loss.   HENT: Negative for congestion.    Eyes: Negative for blurred vision.   Cardiovascular: As mentioned above  Respiratory: Negative for cough and hemoptysis.    Endocrine: Negative for polydipsia and polyuria.   Hematologic/Lymphatic: Negative for bleeding problem. Does not bruise/bleed easily.   Skin: Negative for flushing.   Musculoskeletal: Negative for neck pain and stiffness.   Gastrointestinal: Negative for abdominal pain, diarrhea, jaundice, melena, nausea and vomiting.   Genitourinary: Negative for dysuria and hematuria.   Neurological: Negative for dizziness, focal weakness and numbness.   Psychiatric/Behavioral: Negative for altered mental status and depression.          All other systems were reviewed and were negative.    family history includes Diabetes in an other family member; Heart disease in an other family member; Hypertension in an other family member; Stroke in an other family member.     reports that she has never smoked. She has never used smokeless tobacco. Drug use questions deferred to the physician. She reports that she does not drink alcohol.    Allergies   Allergen Reactions   • Atenolol Nausea And Vomiting   • Lortab [Hydrocodone-Acetaminophen] Mental Status Change         Current Outpatient Medications:   •  acetaminophen (TYLENOL 8 HOUR) 650 MG 8 hr tablet, Take 650 mg by mouth Every 8 (Eight) Hours As Needed for Mild Pain ., Disp: , Rfl:   •  aspirin 81 MG EC tablet, Take 81 mg by mouth Daily., Disp: , Rfl:   •  atorvastatin (LIPITOR) 40 MG tablet, Take 40 mg by mouth Every Evening., Disp: , Rfl:   •  carvedilol (COREG) 6.25 MG tablet, 25 mg 2 (Two) Times a Day., Disp: , Rfl:   •  chlorthalidone (HYGROTON) 25 MG tablet, Take 1 tablet by mouth Daily., Disp: 30 tablet, Rfl: 5  •  ferrous sulfate 325 (65 FE) MG tablet, Daily., Disp: , Rfl:   •  furosemide  "(LASIX) 20 MG tablet, 20 mg As Needed., Disp: , Rfl:   •  gabapentin (NEURONTIN) 600 MG tablet, Take 300 mg by mouth Daily As Needed. prn, Disp: , Rfl:   •  ibuprofen (ADVIL,MOTRIN) 800 MG tablet, Every 8 (Eight) Hours., Disp: , Rfl:   •  metFORMIN (GLUCOPHAGE) 500 MG tablet, Take 500 mg by mouth 2 (Two) Times a Day With Meals., Disp: , Rfl:   •  Multiple Vitamins-Minerals (MULTIVITAMIN WITH MINERALS) tablet tablet, Take 1 tablet by mouth Daily., Disp: , Rfl:   •  potassium chloride (K-TAB) 20 MEQ tablet controlled-release ER tablet, Take 1 tablet by mouth 2 (Two) Times a Day., Disp: 60 tablet, Rfl: 6  •  vitamin B-12 (CYANOCOBALAMIN) 1000 MCG tablet, Take 1,000 mcg by mouth Daily., Disp: , Rfl:   •  VITAMIN D PO, Take 2,000 Units by mouth Daily., Disp: , Rfl:   •  amLODIPine (NORVASC) 10 MG tablet, As Needed., Disp: , Rfl:     Physical Exam:  Vitals:    06/11/21 0834 06/11/21 0835   BP: 136/82 142/86   BP Location: Left arm Right arm   Patient Position: Sitting Sitting   Cuff Size: Large Adult Large Adult   Pulse: 83    SpO2: 97%    Weight: 99.6 kg (219 lb 9.6 oz)    Height: 162.6 cm (64\")    PainSc: 0-No pain      Current Pain Level: none  Pulse Ox: Normal  on room air  General: alert, appears stated age and cooperative     Body Habitus: well-nourished    HEENT: Head: Normocephalic, no lesions, without obvious abnormality. No arcus senilis, xanthelasma or xanthomas.    Neuro: alert, oriented x3  Pulses: 2+ and symmetric  JVP: Volume/Pulsation: Normal.  Normal waveforms.   Appropriate inspiratory decrease.  No Kussmaul's. No Juma's.   Carotid Exam: no bruit normal pulsation bilaterally   Carotid Volume: normal.     Respirations: no increased work of breathing   Chest:  Normal    Pulmonary:Normal   Precordium: Normal impulses. P2 is not palpable.  RV Heave: absent  LV Heave: absent  [place]:  normal size and placement  Palpable S4: absent.  Heart rate: normal    Heart Rhythm: regular     Heart Sounds: S1: normal "  S2: normal  S3: absent   S4: absent  Opening Snap: absent    Pericardial Rub:  Absent: .    Abdomen:   Appearance: normal .  Palpation: Soft, non-tender to palpation, bowel sounds positive in all four quadrants; no guarding or rebound tenderness  Extremity: no edema.   LE Skin: no rashes  LE Hair:  normal  LE Pulses: well perfused with normal pulses in the distal extremities  Pallor on elevation: Absent. Rubor on dependency: None      DATA REVIEWED:     EKG. I personally reviewed and interpreted the EKG.  Normal sinus rhythm with sinus arrhythmia.    ECG/EMG Results (all)     None        ---------------------------------------------------  TTE/BIRDIE:  Results for orders placed in visit on 02/09/21    Adult Transthoracic Echo Complete W/ Cont if Necessary Per Protocol    Interpretation Summary  · Left ventricular wall thickness is consistent with mild concentric hypertrophy.  · Left ventricular ejection fraction appears to be 51 - 55%.  · Left ventricular diastolic function was normal.  · Estimated right ventricular systolic pressure from tricuspid regurgitation is normal (<35 mmHg).            --------------------------------------------------------------------------------------------------  LABS:     The CVD Risk score (EUSEBIO'Agostino, et al., 2008) failed to calculate for the following reasons:    The patient has a prior MI, stroke, CHF, or peripheral vascular disease diagnosis         Lab Results   Component Value Date    GLUCOSE 145 (H) 02/26/2021    BUN 33 (H) 02/26/2021    CREATININE 1.40 (H) 02/26/2021    EGFRIFAFRI 45 (L) 02/26/2021    BCR 23.6 02/26/2021    K 3.8 02/26/2021    CO2 28.0 02/26/2021    CALCIUM 10.4 02/26/2021    ALBUMIN 4.20 02/09/2021    AST 17 02/09/2021    ALT 16 02/09/2021     Lab Results   Component Value Date    WBC 9.15 02/09/2021    HGB 11.9 (L) 02/09/2021    HCT 35.7 02/09/2021    MCV 93.5 02/09/2021     02/09/2021     Lab Results   Component Value Date    CHOL 137 02/09/2021     CHLPL 197 12/15/2015    TRIG 78 02/09/2021    HDL 49 02/09/2021    LDL 73 02/09/2021     Lab Results   Component Value Date    TSH 2.080 03/05/2020     Lab Results   Component Value Date    TROPONINT <0.010 03/06/2020     Lab Results   Component Value Date    HGBA1C 6.0 (H) 12/15/2015     No results found for: DDIMER  Lab Results   Component Value Date    ALT 16 02/09/2021     Lab Results   Component Value Date    HGBA1C 6.0 (H) 12/15/2015    HGBA1C 6.3 (H) 08/03/2015    HGBA1C 6.4 (H) 03/17/2015     Lab Results   Component Value Date    MICROALBUR <1.2 01/10/2020    CREATININE 1.40 (H) 02/26/2021     No results found for: IRON, TIBC, FERRITIN  No results found for: INR, PROTIME    Assessment/Plan     1.  Shortness of breath:  Multifactorial from obesity, restrictive disease on PFTs and borderline elevated pulmonary pressures in setting of moderate mitral regurgitation.  Also has significant diastolic dysfunction and aortic regurgitation    2.  Moderate mitral regurgitation/mild aortic regurgitation:  Optimal blood pressure control.  Continue to monitor.  Recent echocardiogram in April 2021 showed only mild MR.  AI was mild    3.  Hypertension:  On carvedilol 6.25 mg, chlorthalidone 25 mg, Lasix 20 mg as needed, amlodipine 10 mg which patient is currently not taking.  In the setting of multi valvular disease and significant diastolic dysfunction we will start her back on 5 amlodipine for optimal blood pressure control.      Prevention:  Patient's Body mass index is 37.69 kg/m². indicating that she is obese (BMI >30). Obesity-related health conditions include the following: hypertension and coronary heart disease. Obesity is newly identified. BMI is is above average; BMI management plan is completed. We discussed portion control and increasing exercise..      Bacilio Shaffer  reports that she has never smoked. She has never used smokeless tobacco..        This document has been electronically signed by Bryant Ramirez  MD on June 11, 2021 08:50 CDT

## 2021-06-15 RX ORDER — AMLODIPINE BESYLATE 5 MG/1
5 TABLET ORAL DAILY
Qty: 90 TABLET | Refills: 3 | Status: SHIPPED | OUTPATIENT
Start: 2021-06-15 | End: 2021-12-13

## 2021-07-01 ENCOUNTER — DOCUMENTATION (OUTPATIENT)
Dept: SLEEP MEDICINE | Facility: HOSPITAL | Age: 71
End: 2021-07-01

## 2021-07-02 NOTE — PROGRESS NOTES
Paper prescription from [facility] for Pap supplies received.  Have signed and renewed.  Recently seen in clinic in May and has follow-up in August.      Fam Daly II, MD  07/01/21 @ 7:04 PM CDT

## 2021-08-26 RX ORDER — CHLORTHALIDONE 25 MG/1
TABLET ORAL
Qty: 30 TABLET | Refills: 6 | Status: SHIPPED | OUTPATIENT
Start: 2021-08-26 | End: 2022-09-29 | Stop reason: HOSPADM

## 2021-09-03 ENCOUNTER — OFFICE VISIT (OUTPATIENT)
Dept: SLEEP MEDICINE | Facility: HOSPITAL | Age: 71
End: 2021-09-03

## 2021-09-03 VITALS
HEART RATE: 82 BPM | SYSTOLIC BLOOD PRESSURE: 131 MMHG | WEIGHT: 219 LBS | HEIGHT: 64 IN | OXYGEN SATURATION: 98 % | DIASTOLIC BLOOD PRESSURE: 88 MMHG | BODY MASS INDEX: 37.39 KG/M2

## 2021-09-03 DIAGNOSIS — E66.01 MORBID OBESITY (HCC): ICD-10-CM

## 2021-09-03 DIAGNOSIS — G47.33 OBSTRUCTIVE SLEEP APNEA, ADULT: Primary | ICD-10-CM

## 2021-09-03 DIAGNOSIS — G25.81 RESTLESS LEGS SYNDROME (RLS): ICD-10-CM

## 2021-09-03 PROCEDURE — 99213 OFFICE O/P EST LOW 20 MIN: CPT | Performed by: NURSE PRACTITIONER

## 2021-09-03 NOTE — PROGRESS NOTES
Sleep Clinic Follow Up    Date: 9/3/2021  Primary Care Provider: Howie Mohamud DO    Last office visit: 2021 (I reviewed this note)    CC: Follow up: RAYMOND started on CPAP      Interim History:  Since the last visit:    1) mild to moderate RAYMOND -  Bacilio Shaffer has mostly remained compliant with CPAP. She denies mask and machine issues, dry mouth, headaches, or pressures intolerance. She denies abnormal dreams, sleep paralysis, nasal congestion, URI sx. She is struggling with dry mouth, but otherwise has no complaints. She states that she feels as though she is getting slightly more refreshing sleep and having less daytime sleepiness.    2) Patient reports rare RLS symptoms - these have improved since starting PAP therapy as well as since doing bicycle exercise therapy.    Sleep Testin. PSG on 2021, AHI of 12, REM AHI of 40  2. CPAP titration on 2021, recommended 14 cm H2O   3. Currently on 14 cm H2O    PAP Data:    Time frame: 2021-2021   Compliance: 91%  Average use on days used: 4 hrs 14 min  Percent of days with usage greater than or equal to 4 hours: 51%  PAP range: 14 cm H2O  Average 90% pressure: 14 cmH2O  Leak: 4.7 L/minutes  Average AHI: 0.7 events/hr  Mask type: Full face mask  DME: [facility]    Bed time: 2100  Sleep latency: 30 minutes  Number of times awakens during the night: 3  Wake time: 0500  Estimated total sleep time at night: 6 hours  Caffeine intake: 0 cups of coffee, 1 cups of tea, and 0 sodas per day  Alcohol intake: 0 drinks per week  Nap time: every day   Sleepiness with Driving: none     [place] - 9    PMHx, FH, SH reviewed and pertinent changes are: Reportedly unchanged from last office visit with us.      REVIEW OF SYSTEMS:   +Dyspnea on exertion  Negative for chest pain, SOA, fever, chills, cough, N/V/D, abdominal pain.    Smoking:none    Bacilio Shaffer  reports that she has never smoked. She has never used smokeless  tobacco.    Exam:  Vitals:    09/03/21 0914   BP: 131/88   Pulse: 82   SpO2: 98%           09/03/21 0914   Weight: 99.3 kg (219 lb)     Body mass index is 37.57 kg/m². Patient's Body mass index is 37.57 kg/m². indicating that she is morbidly obese (BMI > 40 or > 35 with obesity - related health condition). Obesity-related health conditions include the following: obstructive sleep apnea, hypertension and dyslipidemias. Obesity is unchanged. BMI is is above average; BMI management plan is completed. I recommend portion control and increasing exercise.      Gen:                No distress, conversant, pleasant, appears stated age, alert, oriented  Eyes:               Anicteric sclera, moist conjunctiva, no lid lag                           PERRL, EOMI   Heent:             NC/AT                          Oropharynx clear                          Normal hearing  Lungs:             Normal effort, non-labored breathing                          Clear to auscultation bilaterally          CV:                  Normal S1/S2, no murmur                          No lower extremity edema  ABD:               Soft, rounded, non-distended                          Normal bowel sounds                    Psych:             Appropriate affect  Neuro:             CN 2-12 appear intact    Past Medical History:   Diagnosis Date   • Carpal tunnel syndrome    • Diabetes mellitus (CMS/HCC)    • Diabetic neuropathy (CMS/HCC)    • Female stress incontinence    • History of colonoscopy 2007   • History of mammogram 05/2011   • Hyperlipidemia    • Hypertension    • Murmur, cardiac    • Sciatica of left side    • Vitamin D deficiency        Current Outpatient Medications:   •  acetaminophen (TYLENOL 8 HOUR) 650 MG 8 hr tablet, Take 650 mg by mouth Every 8 (Eight) Hours As Needed for Mild Pain ., Disp: , Rfl:   •  amLODIPine (NORVASC) 5 MG tablet, Take 1 tablet by mouth Daily., Disp: 90 tablet, Rfl: 3  •  aspirin 81 MG EC tablet, Take 81 mg by mouth  Daily., Disp: , Rfl:   •  atorvastatin (LIPITOR) 40 MG tablet, Take 40 mg by mouth Every Evening., Disp: , Rfl:   •  carvedilol (COREG) 6.25 MG tablet, 25 mg 2 (Two) Times a Day., Disp: , Rfl:   •  chlorthalidone (HYGROTON) 25 MG tablet, Take 1 tablet by mouth once daily, Disp: 30 tablet, Rfl: 6  •  ferrous sulfate 325 (65 FE) MG tablet, Daily., Disp: , Rfl:   •  furosemide (LASIX) 20 MG tablet, 20 mg As Needed., Disp: , Rfl:   •  gabapentin (NEURONTIN) 600 MG tablet, Take 300 mg by mouth Daily As Needed. prn, Disp: , Rfl:   •  metFORMIN (GLUCOPHAGE) 500 MG tablet, Take 500 mg by mouth 2 (Two) Times a Day With Meals., Disp: , Rfl:   •  Multiple Vitamins-Minerals (MULTIVITAMIN WITH MINERALS) tablet tablet, Take 1 tablet by mouth Daily., Disp: , Rfl:   •  potassium chloride (K-TAB) 20 MEQ tablet controlled-release ER tablet, Take 1 tablet by mouth 2 (Two) Times a Day., Disp: 60 tablet, Rfl: 6  •  vitamin B-12 (CYANOCOBALAMIN) 1000 MCG tablet, Take 1,000 mcg by mouth Daily., Disp: , Rfl:   •  VITAMIN D PO, Take 2,000 Units by mouth Daily., Disp: , Rfl:     WBC   Date Value Ref Range Status   02/09/2021 9.15 3.40 - 10.80 10*3/mm3 Final     RBC   Date Value Ref Range Status   02/09/2021 3.82 3.77 - 5.28 10*6/mm3 Final     Hemoglobin   Date Value Ref Range Status   02/09/2021 11.9 (L) 12.0 - 15.9 g/dL Final     Hematocrit   Date Value Ref Range Status   02/09/2021 35.7 34.0 - 46.6 % Final     MCV   Date Value Ref Range Status   02/09/2021 93.5 79.0 - 97.0 fL Final     MCH   Date Value Ref Range Status   02/09/2021 31.2 26.6 - 33.0 pg Final     MCHC   Date Value Ref Range Status   02/09/2021 33.3 31.5 - 35.7 g/dL Final     RDW   Date Value Ref Range Status   02/09/2021 11.7 (L) 12.3 - 15.4 % Final     RDW-SD   Date Value Ref Range Status   02/09/2021 39.7 37.0 - 54.0 fl Final     MPV   Date Value Ref Range Status   02/09/2021 12.1 (H) 6.0 - 12.0 fL Final     Platelets   Date Value Ref Range Status   02/09/2021 279 140 -  450 10*3/mm3 Final     Neutrophil %   Date Value Ref Range Status   03/06/2020 54.0 42.7 - 76.0 % Final     Lymphocyte %   Date Value Ref Range Status   03/06/2020 33.2 19.6 - 45.3 % Final     Monocyte %   Date Value Ref Range Status   03/06/2020 8.0 5.0 - 12.0 % Final     Eosinophil %   Date Value Ref Range Status   03/06/2020 4.2 0.3 - 6.2 % Final     Basophil %   Date Value Ref Range Status   03/06/2020 0.3 0.0 - 1.5 % Final     Immature Grans %   Date Value Ref Range Status   03/06/2020 0.3 0.0 - 0.5 % Final     Neutrophils, Absolute   Date Value Ref Range Status   03/06/2020 3.70 1.70 - 7.00 10*3/mm3 Final     Lymphocytes, Absolute   Date Value Ref Range Status   03/06/2020 2.28 0.70 - 3.10 10*3/mm3 Final     Monocytes, Absolute   Date Value Ref Range Status   03/06/2020 0.55 0.10 - 0.90 10*3/mm3 Final     Eosinophils, Absolute   Date Value Ref Range Status   03/06/2020 0.29 0.00 - 0.40 10*3/mm3 Final     Basophils, Absolute   Date Value Ref Range Status   03/06/2020 0.02 0.00 - 0.20 10*3/mm3 Final     Immature Grans, Absolute   Date Value Ref Range Status   03/06/2020 0.02 0.00 - 0.05 10*3/mm3 Final     nRBC   Date Value Ref Range Status   03/06/2020 0.0 0.0 - 0.2 /100 WBC Final       Lab Results   Component Value Date    GLUCOSE 145 (H) 02/26/2021    BUN 33 (H) 02/26/2021    CREATININE 1.40 (H) 02/26/2021    EGFRIFAFRI 45 (L) 02/26/2021    BCR 23.6 02/26/2021    K 3.8 02/26/2021    CO2 28.0 02/26/2021    CALCIUM 10.4 02/26/2021    ALBUMIN 4.20 02/09/2021    AST 17 02/09/2021    ALT 16 02/09/2021       Assessment and Plan:    1. Obstructive sleep apnea - Established, stable (1)  1. Mostly compliant with PAP therapy - encouraged increased hours of usage  2. Continue PAP as prescribed  3. Recommended dry mouth rinse as well as mask liners  4. Return to clinic in 2 months with compliance report unless change in symptoms in interim period  2. Restless leg syndrome/Periodic limb movement disorder (RLS/PLMD) -  Established, stable (1)  3. Morbid obesity - BMI 37.6 - stable chronic illness      I spent 20 minutes caring for Bacilio on this date of service. This time includes time spent by me in the following activities: preparing for the visit, reviewing tests, obtaining and/or reviewing a separately obtained history, performing a medically appropriate examination and/or evaluation , counseling and educating the patient/family/caregiver and documenting information in the medical record; discussing PAP therapy, PAP compliance and PAP maintenance    RTC in 2 months. Patient agrees to return sooner if changes in symptoms.        This document has been electronically signed by BRIANNA Young on September 3, 2021 09:27 CDT          CC: Howie Mohamud,           No ref. provider found

## 2021-09-07 DIAGNOSIS — G47.33 OSA (OBSTRUCTIVE SLEEP APNEA): Primary | ICD-10-CM

## 2021-11-03 ENCOUNTER — OFFICE VISIT (OUTPATIENT)
Dept: SLEEP MEDICINE | Facility: HOSPITAL | Age: 71
End: 2021-11-03

## 2021-11-03 VITALS
HEART RATE: 82 BPM | WEIGHT: 216 LBS | HEIGHT: 64 IN | DIASTOLIC BLOOD PRESSURE: 87 MMHG | OXYGEN SATURATION: 98 % | SYSTOLIC BLOOD PRESSURE: 116 MMHG | BODY MASS INDEX: 36.88 KG/M2

## 2021-11-03 DIAGNOSIS — G47.33 OBSTRUCTIVE SLEEP APNEA, ADULT: Primary | ICD-10-CM

## 2021-11-03 DIAGNOSIS — E66.01 MORBID OBESITY (HCC): ICD-10-CM

## 2021-11-03 PROCEDURE — 99213 OFFICE O/P EST LOW 20 MIN: CPT | Performed by: NURSE PRACTITIONER

## 2021-11-03 NOTE — PROGRESS NOTES
Sleep Clinic Follow Up    Date: 11/3/2021  Primary Care Provider: Howie Mohamud DO    Last office visit: 2021 (I reviewed this note)    CC: Follow up: RAYMOND on CPAP      Interim History:  Since the last visit:    1) mild to moderate RAYMOND -  Bacilio Shaffer has had suboptimal compliance with CPAP. She denies mask and machine issues, headaches, or pressures intolerance. She denies abnormal dreams, sleep paralysis, nasal congestion, URI sx. At our last visit, patient was reporting dry mouth and we discussed Biotene/dry mouth rinse as well as mask liners. Patient reports severe dry mouth.    2) Patient denies RLS symptoms.     Sleep Testin. PSG on 2021, AHI of 12, REM AHI of 40   2. CPAP titration on 2021, recommended 14 cm H2O   3. Currently on 14 cm H2O    PAP Data:    Time frame: 2021-2021   Compliance: 71%  Average use on days used: 5 hrs 44 min  Percent of days with usage greater than or equal to 4 hours: 52%  PAP range: 14 cm H2O  Average 90% pressure: 14 cmH2O  Leak: 11.3 L/minutes  Average AHI: 0.8 events/hr  Mask type: Full face mask  DME: [facility]    Bed time: 2100  Sleep latency: 30 minutes  Number of times awakens during the night: 4  Wake time: 0500  Estimated total sleep time at night: 5-6 hours  Caffeine intake: 0 cups of coffee, 2 cups of tea, and 0 sodas per day  Alcohol intake: 0 drinks per week  Nap time: daily    Sleepiness with Driving: none     [place] - 5    PMHx, FH, SH reviewed and pertinent changes are: Started magnesium oxide 400 mg.    REVIEW OF SYSTEMS:   Negative for chest pain, SOA, fever, chills, cough, N/V/D, abdominal pain.    Smoking:none    Bacilio Shaffer  reports that she has never smoked. She has never used smokeless tobacco.      Exam:  Vitals:    21 1024   BP: 116/87   Pulse: 82   SpO2: 98%           21  1024   Weight: 98 kg (216 lb)     Body mass index is 37.06 kg/m². Patient's Body mass index is 37.06 kg/m².  indicating that she is morbidly obese (BMI > 40 or > 35 with obesity - related health condition). Obesity-related health conditions include the following: obstructive sleep apnea, hypertension and dyslipidemias. Obesity is stable. BMI is is above average; BMI management plan is completed. I recommend portion control and increasing exercise.      Gen:                No distress, conversant, pleasant, appears stated age, alert, oriented  Eyes:               Anicteric sclera, moist conjunctiva, no lid lag                           PERRL, EOMI   Heent:             NC/AT                          Oropharynx clear                          Normal hearing  Lungs:             Normal effort, non-labored breathing                          Clear to auscultation bilaterally          CV:                  Normal S1/S2, no murmur                          No lower extremity edema  ABD:               Soft, rounded, non-distended                          Normal bowel sounds                    Psych:             Appropriate affect  Neuro:             CN 2-12 appear intact    Past Medical History:   Diagnosis Date   • Carpal tunnel syndrome    • Diabetes mellitus (HCC)    • Diabetic neuropathy (HCC)    • Female stress incontinence    • History of colonoscopy 2007   • History of mammogram 05/2011   • Hyperlipidemia    • Hypertension    • Murmur, cardiac    • Sciatica of left side    • Vitamin D deficiency        Current Outpatient Medications:   •  acetaminophen (TYLENOL 8 HOUR) 650 MG 8 hr tablet, Take 650 mg by mouth Every 8 (Eight) Hours As Needed for Mild Pain ., Disp: , Rfl:   •  amLODIPine (NORVASC) 5 MG tablet, Take 1 tablet by mouth Daily., Disp: 90 tablet, Rfl: 3  •  aspirin 81 MG EC tablet, Take 81 mg by mouth Daily., Disp: , Rfl:   •  atorvastatin (LIPITOR) 40 MG tablet, Take 40 mg by mouth Every Evening., Disp: , Rfl:   •  carvedilol (COREG) 6.25 MG tablet, 25 mg 2 (Two) Times a Day., Disp: , Rfl:   •  chlorthalidone  (HYGROTON) 25 MG tablet, Take 1 tablet by mouth once daily, Disp: 30 tablet, Rfl: 6  •  ferrous sulfate 325 (65 FE) MG tablet, Daily., Disp: , Rfl:   •  furosemide (LASIX) 20 MG tablet, 20 mg As Needed., Disp: , Rfl:   •  gabapentin (NEURONTIN) 600 MG tablet, Take 300 mg by mouth Daily As Needed. prn, Disp: , Rfl:   •  magnesium oxide (MAGOX) 400 (241.3 Mg) MG tablet tablet, Take 400 mg by mouth Daily., Disp: , Rfl:   •  metFORMIN (GLUCOPHAGE) 500 MG tablet, Take 500 mg by mouth 2 (Two) Times a Day With Meals., Disp: , Rfl:   •  Multiple Vitamins-Minerals (MULTIVITAMIN WITH MINERALS) tablet tablet, Take 1 tablet by mouth Daily., Disp: , Rfl:   •  potassium chloride (K-TAB) 20 MEQ tablet controlled-release ER tablet, Take 1 tablet by mouth 2 (Two) Times a Day., Disp: 60 tablet, Rfl: 6  •  vitamin B-12 (CYANOCOBALAMIN) 1000 MCG tablet, Take 1,000 mcg by mouth Daily., Disp: , Rfl:   •  VITAMIN D PO, Take 2,000 Units by mouth Daily., Disp: , Rfl:     WBC   Date Value Ref Range Status   02/09/2021 9.15 3.40 - 10.80 10*3/mm3 Final     RBC   Date Value Ref Range Status   02/09/2021 3.82 3.77 - 5.28 10*6/mm3 Final     Hemoglobin   Date Value Ref Range Status   02/09/2021 11.9 (L) 12.0 - 15.9 g/dL Final     Hematocrit   Date Value Ref Range Status   02/09/2021 35.7 34.0 - 46.6 % Final     MCV   Date Value Ref Range Status   02/09/2021 93.5 79.0 - 97.0 fL Final     MCH   Date Value Ref Range Status   02/09/2021 31.2 26.6 - 33.0 pg Final     MCHC   Date Value Ref Range Status   02/09/2021 33.3 31.5 - 35.7 g/dL Final     RDW   Date Value Ref Range Status   02/09/2021 11.7 (L) 12.3 - 15.4 % Final     RDW-SD   Date Value Ref Range Status   02/09/2021 39.7 37.0 - 54.0 fl Final     MPV   Date Value Ref Range Status   02/09/2021 12.1 (H) 6.0 - 12.0 fL Final     Platelets   Date Value Ref Range Status   02/09/2021 279 140 - 450 10*3/mm3 Final     Neutrophil %   Date Value Ref Range Status   03/06/2020 54.0 42.7 - 76.0 % Final      Lymphocyte %   Date Value Ref Range Status   03/06/2020 33.2 19.6 - 45.3 % Final     Monocyte %   Date Value Ref Range Status   03/06/2020 8.0 5.0 - 12.0 % Final     Eosinophil %   Date Value Ref Range Status   03/06/2020 4.2 0.3 - 6.2 % Final     Basophil %   Date Value Ref Range Status   03/06/2020 0.3 0.0 - 1.5 % Final     Immature Grans %   Date Value Ref Range Status   03/06/2020 0.3 0.0 - 0.5 % Final     Neutrophils, Absolute   Date Value Ref Range Status   03/06/2020 3.70 1.70 - 7.00 10*3/mm3 Final     Lymphocytes, Absolute   Date Value Ref Range Status   03/06/2020 2.28 0.70 - 3.10 10*3/mm3 Final     Monocytes, Absolute   Date Value Ref Range Status   03/06/2020 0.55 0.10 - 0.90 10*3/mm3 Final     Eosinophils, Absolute   Date Value Ref Range Status   03/06/2020 0.29 0.00 - 0.40 10*3/mm3 Final     Basophils, Absolute   Date Value Ref Range Status   03/06/2020 0.02 0.00 - 0.20 10*3/mm3 Final     Immature Grans, Absolute   Date Value Ref Range Status   03/06/2020 0.02 0.00 - 0.05 10*3/mm3 Final     nRBC   Date Value Ref Range Status   03/06/2020 0.0 0.0 - 0.2 /100 WBC Final       Lab Results   Component Value Date    GLUCOSE 145 (H) 02/26/2021    BUN 33 (H) 02/26/2021    CREATININE 1.40 (H) 02/26/2021    EGFRIFAFRI 45 (L) 02/26/2021    BCR 23.6 02/26/2021    K 3.8 02/26/2021    CO2 28.0 02/26/2021    CALCIUM 10.4 02/26/2021    ALBUMIN 4.20 02/09/2021    AST 17 02/09/2021    ALT 16 02/09/2021       Assessment and Plan:    1. Obstructive sleep apnea - Established, stable (1)  1. Suboptimally compliant with PAP therapy due to dry mouth  2. Continue PAP as prescribed  3. Script for PAP supplies  4. Decrease pressure to 12 cm H2O for comfort  5. Encouraged different style of mask liner  6. Adjust humidifier settings  7. Return to clinic in 3 months with compliance report unless change in symptoms in interim period  2. Morbid obesity - BMI 37.1 - stable chronic illness      I spent 20 minutes caring for Bacilio on  this date of service. This time includes time spent by me in the following activities: preparing for the visit, reviewing tests, obtaining and/or reviewing a separately obtained history, performing a medically appropriate examination and/or evaluation , counseling and educating the patient/family/caregiver and documenting information in the medical record; discussing PAP therapy, PAP compliance and PAP maintenance    RTC in 3 months. Patient agrees to return sooner if changes in symptoms.        This document has been electronically signed by BRIANNA Alvarez on November 3, 2021 10:35 CDT          CC: Howie Mohamud,           No ref. provider found

## 2021-12-13 ENCOUNTER — OFFICE VISIT (OUTPATIENT)
Dept: CARDIOLOGY | Facility: CLINIC | Age: 71
End: 2021-12-13

## 2021-12-13 VITALS
OXYGEN SATURATION: 98 % | WEIGHT: 211 LBS | HEART RATE: 81 BPM | TEMPERATURE: 97.3 F | BODY MASS INDEX: 36.02 KG/M2 | DIASTOLIC BLOOD PRESSURE: 90 MMHG | SYSTOLIC BLOOD PRESSURE: 138 MMHG | HEIGHT: 64 IN

## 2021-12-13 DIAGNOSIS — I83.93 VARICOSE VEINS OF BOTH LOWER EXTREMITIES, UNSPECIFIED WHETHER COMPLICATED: ICD-10-CM

## 2021-12-13 DIAGNOSIS — I27.20 PULMONARY HYPERTENSION (HCC): Primary | ICD-10-CM

## 2021-12-13 DIAGNOSIS — R60.0 LOCALIZED EDEMA: ICD-10-CM

## 2021-12-13 PROCEDURE — 93000 ELECTROCARDIOGRAM COMPLETE: CPT | Performed by: INTERNAL MEDICINE

## 2021-12-13 PROCEDURE — 99214 OFFICE O/P EST MOD 30 MIN: CPT | Performed by: INTERNAL MEDICINE

## 2021-12-13 RX ORDER — AMLODIPINE BESYLATE 10 MG/1
10 TABLET ORAL DAILY
Qty: 90 TABLET | Refills: 3 | Status: ON HOLD | OUTPATIENT
Start: 2021-12-13 | End: 2022-09-26

## 2021-12-13 NOTE — PROGRESS NOTES
[facility] Cardiology  OFFICE NOTE    Cardiovascular Medicine  Bryant Ramirez M.D., [facility], FSCAI, RPVI         No referring provider defined for this encounter.    Thank you for asking me to see Bacilio Shaffer for shortness of breath.    History of Present Illness  This is a 71 y.o. female with:    1.PAH  A. PFTs 2020, restrictive  -FEV1/FVC 99% of predicted  -FVC 62% of predicted  -TLC 76% of predicted  -DLCO 45% of predicted  2. MR  3. TR  4.AI  5.  Risks: HTN, HLD, DM2 , Obese, Snores    Bacilio Shaffer is a 71 y.o. female who presents for consultation today.  Patient was previously established with Dr. Saravia, for moderate mitral regurgitation otherwise mild aortic and tricuspid regurgitation.  She was also found to have pulmonary pressures in the low 40s in the setting of moderate mitral regurgitation.    She initially preferred noninvasive management.  She did not originally want to see a sleep physician.  it was felt that her pulmonary pressures were likely from her worsening mitral regurgitation, so she was recommended to undergo ongoing surveillance.  She was agreeable to having PFTs performed.  These were performed in 2020 and were consistent with restrictive lung physiology.  Patient's FEV1/FVC was 99% of predicted.  The patient's FVC was low at 62% of predicted with TLC 76% of predicted.  The DLCO was also low at 45% of predicted.  The patient has not had any CT imaging.  She did have a chest x-ray which did not show any evidence of ILD.     Patient also had a stress echo in January 2020 which was an EF of 51 to 55% with no evidence of induced ischemia.  Heart rate was at 73% predicted though.    Patient went a right heart cath I mean PA pressures about 25, cardiac output and cardiac index normal, pulmonary capillary wedge pressure is 11.  NM was 3.8 Wood minutes.  She has not started on any specific therapies.    She has chronic shortness of breath NYHA class II which is at  baseline.  Denying orthopnea PND.      Review of Systems - ROS  Constitution: Negative for weakness, weight gain and weight loss.   HENT: Negative for congestion.    Eyes: Negative for blurred vision.   Cardiovascular: As mentioned above  Respiratory: Negative for cough and hemoptysis.    Endocrine: Negative for polydipsia and polyuria.   Hematologic/Lymphatic: Negative for bleeding problem. Does not bruise/bleed easily.   Skin: Negative for flushing.   Musculoskeletal: Negative for neck pain and stiffness.   Gastrointestinal: Negative for abdominal pain, diarrhea, jaundice, melena, nausea and vomiting.   Genitourinary: Negative for dysuria and hematuria.   Neurological: Negative for dizziness, focal weakness and numbness.   Psychiatric/Behavioral: Negative for altered mental status and depression.          All other systems were reviewed and were negative.    family history includes Diabetes in an other family member; Heart disease in an other family member; Hypertension in an other family member; Stroke in an other family member.     reports that she has never smoked. She has never used smokeless tobacco. Drug use questions deferred to the physician. She reports that she does not drink alcohol.    Allergies   Allergen Reactions   • Atenolol Nausea And Vomiting   • Lortab [Hydrocodone-Acetaminophen] Mental Status Change         Current Outpatient Medications:   •  acetaminophen (TYLENOL 8 HOUR) 650 MG 8 hr tablet, Take 650 mg by mouth Every 8 (Eight) Hours As Needed for Mild Pain ., Disp: , Rfl:   •  amLODIPine (NORVASC) 5 MG tablet, Take 1 tablet by mouth Daily., Disp: 90 tablet, Rfl: 3  •  aspirin 81 MG EC tablet, Take 81 mg by mouth Daily., Disp: , Rfl:   •  atorvastatin (LIPITOR) 40 MG tablet, Take 40 mg by mouth Every Evening., Disp: , Rfl:   •  carvedilol (COREG) 6.25 MG tablet, 6.25 mg 2 (Two) Times a Day., Disp: , Rfl:   •  chlorthalidone (HYGROTON) 25 MG tablet, Take 1 tablet by mouth once daily, Disp: 30  "tablet, Rfl: 6  •  ferrous sulfate 325 (65 FE) MG tablet, Daily., Disp: , Rfl:   •  furosemide (LASIX) 20 MG tablet, 20 mg Every Other Day., Disp: , Rfl:   •  magnesium oxide (MAGOX) 400 (241.3 Mg) MG tablet tablet, Take 400 mg by mouth Daily., Disp: , Rfl:   •  metFORMIN (GLUCOPHAGE) 500 MG tablet, Take 500 mg by mouth 2 (Two) Times a Day With Meals., Disp: , Rfl:   •  Multiple Vitamins-Minerals (MULTIVITAMIN WITH MINERALS) tablet tablet, Take 1 tablet by mouth Daily., Disp: , Rfl:   •  potassium chloride (K-TAB) 20 MEQ tablet controlled-release ER tablet, Take 1 tablet by mouth 2 (Two) Times a Day., Disp: 60 tablet, Rfl: 6  •  vitamin B-12 (CYANOCOBALAMIN) 1000 MCG tablet, Take 1,000 mcg by mouth Daily., Disp: , Rfl:   •  VITAMIN D PO, Take 2,000 Units by mouth Daily., Disp: , Rfl:     Physical Exam:  Vitals:    12/13/21 1043   BP: 138/90   BP Location: Left arm   Patient Position: Sitting   Cuff Size: Adult   Pulse: 81   Temp: 97.3 °F (36.3 °C)   SpO2: 98%   Weight: 95.7 kg (211 lb)   Height: 162.6 cm (64\")   PainSc:   7   PainLoc: Knee     Current Pain Level: none  Pulse Ox: Normal  on room air  General: alert, appears stated age and cooperative     Body Habitus: well-nourished    HEENT: Head: Normocephalic, no lesions, without obvious abnormality. No arcus senilis, xanthelasma or xanthomas.    Neuro: alert, oriented x3  Pulses: 2+ and symmetric  JVP: Volume/Pulsation: Normal.  Normal waveforms.   Appropriate inspiratory decrease.  No Kussmaul's. No Juma's.   Carotid Exam: no bruit normal pulsation bilaterally   Carotid Volume: normal.     Respirations: no increased work of breathing   Chest:  Normal    Pulmonary:Normal   Precordium: Normal impulses. P2 is not palpable.  RV Heave: absent  LV Heave: absent  Jumping [place]:  normal size and placement  Palpable S4: absent.  Heart rate: normal    Heart Rhythm: regular     Heart Sounds: S1: normal  S2: normal  S3: absent   S4: absent  Opening Snap: absent    Pericardial " Rub:  Absent: .    Abdomen:   Appearance: normal .  Palpation: Soft, non-tender to palpation, bowel sounds positive in all four quadrants; no guarding or rebound tenderness  Extremity: no edema.   LE Skin: no rashes  LE Hair:  normal  LE Pulses: well perfused with normal pulses in the distal extremities  Pallor on elevation: Absent. Rubor on dependency: None      DATA REVIEWED:     EKG. I personally reviewed and interpreted the EKG.  Normal sinus rhythm with sinus arrhythmia.    ECG/EMG Results (all)     None        ---------------------------------------------------  TTE/BIRDIE:  Results for orders placed in visit on 02/09/21    Adult Transthoracic Echo Complete W/ Cont if Necessary Per Protocol    Interpretation Summary  · Left ventricular wall thickness is consistent with mild concentric hypertrophy.  · Left ventricular ejection fraction appears to be 51 - 55%.  · Left ventricular diastolic function was normal.  · Estimated right ventricular systolic pressure from tricuspid regurgitation is normal (<35 mmHg).            --------------------------------------------------------------------------------------------------  LABS:     The CVD Risk score (EUSEBIO'Agostino, et al., 2008) failed to calculate for the following reasons:    The patient has a prior MI, stroke, CHF, or peripheral vascular disease diagnosis         Lab Results   Component Value Date    GLUCOSE 145 (H) 02/26/2021    BUN 33 (H) 02/26/2021    CREATININE 1.40 (H) 02/26/2021    EGFRIFAFRI 45 (L) 02/26/2021    BCR 23.6 02/26/2021    K 3.8 02/26/2021    CO2 28.0 02/26/2021    CALCIUM 10.4 02/26/2021    ALBUMIN 4.20 02/09/2021    AST 17 02/09/2021    ALT 16 02/09/2021     Lab Results   Component Value Date    WBC 9.15 02/09/2021    HGB 11.9 (L) 02/09/2021    HCT 35.7 02/09/2021    MCV 93.5 02/09/2021     02/09/2021     Lab Results   Component Value Date    CHOL 137 02/09/2021    CHLPL 197 12/15/2015    TRIG 78 02/09/2021    HDL 49 02/09/2021    LDL 73  02/09/2021     Lab Results   Component Value Date    TSH 2.080 03/05/2020     Lab Results   Component Value Date    TROPONINT <0.010 03/06/2020     Lab Results   Component Value Date    HGBA1C 6.0 (H) 12/15/2015     No results found for: DDIMER  Lab Results   Component Value Date    ALT 16 02/09/2021     Lab Results   Component Value Date    HGBA1C 6.0 (H) 12/15/2015    HGBA1C 6.3 (H) 08/03/2015    HGBA1C 6.4 (H) 03/17/2015     Lab Results   Component Value Date    MICROALBUR <1.2 01/10/2020    CREATININE 1.40 (H) 02/26/2021     No results found for: IRON, TIBC, FERRITIN  No results found for: INR, PROTIME    Assessment/Plan     1.  Shortness of breath: Stable  Multifactorial from obesity, restrictive disease on PFTs and borderline elevated pulmonary pressures in setting of moderate mitral regurgitation.  Also has significant diastolic dysfunction and aortic regurgitation  Optimal blood pressure control.    2.  Moderate mitral regurgitation/mild aortic regurgitation:  Optimal blood pressure control.  Continue to monitor.  Repeat echocardiogram in April 2021 with mild MR.  AI was mild    3.  Hypertension:  On carvedilol 6.25 mg, chlorthalidone 25 mg, Lasix 20 mg as needed, amlodipine 10 mg which patient is currently not taking.  In the setting of multi valvular disease and significant diastolic dysfunction we started her back on 5 amlodipine for optimal blood pressure control.  Call blood pressure is improved, diastolic is still elevated will uptitrate amlodipine to 10 mg.    Varicose veins, will get venous ultrasound to assess for DVT and reflux.      Prevention:  Patient's Body mass index is 36.22 kg/m². indicating that she is obese (BMI >30). Obesity-related health conditions include the following: hypertension and coronary heart disease. Obesity is newly identified. BMI is is above average; BMI management plan is completed. We discussed portion control and increasing exercise..      Bacilio Shaffer  reports that  she has never smoked. She has never used smokeless tobacco..        This document has been electronically signed by Bryant Ramirez MD on December 13, 2021 10:52 CST

## 2021-12-22 LAB
QT INTERVAL: 392 MS
QTC INTERVAL: 455 MS

## 2022-09-26 ENCOUNTER — HOSPITAL ENCOUNTER (INPATIENT)
Facility: HOSPITAL | Age: 72
LOS: 3 days | Discharge: HOME | End: 2022-09-29
Attending: HOSPITALIST | Admitting: HOSPITALIST

## 2022-09-26 DIAGNOSIS — I21.4 NSTEMI (NON-ST ELEVATED MYOCARDIAL INFARCTION): Primary | ICD-10-CM

## 2022-09-26 LAB
ANION GAP SERPL CALCULATED.3IONS-SCNC: 14 MMOL/L (ref 5–15)
APTT PPP: 47.4 SECONDS (ref 20–40.3)
BASOPHILS # BLD AUTO: 0.05 10*3/MM3 (ref 0–0.2)
BASOPHILS NFR BLD AUTO: 0.5 % (ref 0–1.5)
BUN SERPL-MCNC: 20 MG/DL (ref 8–23)
BUN/CREAT SERPL: 16.1 (ref 7–25)
CALCIUM SPEC-SCNC: 10 MG/DL (ref 8.6–10.5)
CHLORIDE SERPL-SCNC: 96 MMOL/L (ref 98–107)
CO2 SERPL-SCNC: 26 MMOL/L (ref 22–29)
CREAT SERPL-MCNC: 1.24 MG/DL (ref 0.57–1)
DEPRECATED RDW RBC AUTO: 41.5 FL (ref 37–54)
EGFRCR SERPLBLD CKD-EPI 2021: 46 ML/MIN/1.73
EOSINOPHIL # BLD AUTO: 0.07 10*3/MM3 (ref 0–0.4)
EOSINOPHIL NFR BLD AUTO: 0.8 % (ref 0.3–6.2)
ERYTHROCYTE [DISTWIDTH] IN BLOOD BY AUTOMATED COUNT: 12.7 % (ref 12.3–15.4)
GLUCOSE BLDC GLUCOMTR-MCNC: 144 MG/DL (ref 70–130)
GLUCOSE SERPL-MCNC: 165 MG/DL (ref 65–99)
HCT VFR BLD AUTO: 31.5 % (ref 34–46.6)
HGB BLD-MCNC: 10.4 G/DL (ref 12–15.9)
HOLD SPECIMEN: NORMAL
IMM GRANULOCYTES # BLD AUTO: 0.08 10*3/MM3 (ref 0–0.05)
IMM GRANULOCYTES NFR BLD AUTO: 0.9 % (ref 0–0.5)
INR PPP: 1.12 (ref 0.8–1.2)
LYMPHOCYTES # BLD AUTO: 2.36 10*3/MM3 (ref 0.7–3.1)
LYMPHOCYTES NFR BLD AUTO: 25.7 % (ref 19.6–45.3)
MCH RBC QN AUTO: 29.4 PG (ref 26.6–33)
MCHC RBC AUTO-ENTMCNC: 33 G/DL (ref 31.5–35.7)
MCV RBC AUTO: 89 FL (ref 79–97)
MONOCYTES # BLD AUTO: 0.53 10*3/MM3 (ref 0.1–0.9)
MONOCYTES NFR BLD AUTO: 5.8 % (ref 5–12)
NEUTROPHILS NFR BLD AUTO: 6.09 10*3/MM3 (ref 1.7–7)
NEUTROPHILS NFR BLD AUTO: 66.3 % (ref 42.7–76)
NRBC BLD AUTO-RTO: 0 /100 WBC (ref 0–0.2)
PLATELET # BLD AUTO: 248 10*3/MM3 (ref 140–450)
PMV BLD AUTO: 11.7 FL (ref 6–12)
POTASSIUM SERPL-SCNC: 2.8 MMOL/L (ref 3.5–5.2)
PROTHROMBIN TIME: 14.4 SECONDS (ref 11.1–15.3)
RBC # BLD AUTO: 3.54 10*6/MM3 (ref 3.77–5.28)
SODIUM SERPL-SCNC: 136 MMOL/L (ref 136–145)
TROPONIN T SERPL-MCNC: 0.8 NG/ML (ref 0–0.03)
WBC NRBC COR # BLD: 9.18 10*3/MM3 (ref 3.4–10.8)

## 2022-09-26 PROCEDURE — 93005 ELECTROCARDIOGRAM TRACING: CPT | Performed by: HOSPITALIST

## 2022-09-26 PROCEDURE — 84484 ASSAY OF TROPONIN QUANT: CPT | Performed by: HOSPITALIST

## 2022-09-26 PROCEDURE — 85025 COMPLETE CBC W/AUTO DIFF WBC: CPT | Performed by: HOSPITALIST

## 2022-09-26 PROCEDURE — 80048 BASIC METABOLIC PNL TOTAL CA: CPT | Performed by: HOSPITALIST

## 2022-09-26 PROCEDURE — 85610 PROTHROMBIN TIME: CPT | Performed by: HOSPITALIST

## 2022-09-26 PROCEDURE — 82962 GLUCOSE BLOOD TEST: CPT

## 2022-09-26 PROCEDURE — 99223 1ST HOSP IP/OBS HIGH 75: CPT | Performed by: INTERNAL MEDICINE

## 2022-09-26 PROCEDURE — 25010000002 HEPARIN (PORCINE) PER 1000 UNITS: Performed by: HOSPITALIST

## 2022-09-26 PROCEDURE — 85730 THROMBOPLASTIN TIME PARTIAL: CPT | Performed by: HOSPITALIST

## 2022-09-26 RX ORDER — MULTIPLE VITAMINS W/ MINERALS TAB 9MG-400MCG
1 TAB ORAL DAILY
Status: DISCONTINUED | OUTPATIENT
Start: 2022-09-26 | End: 2022-09-29 | Stop reason: HOSPADM

## 2022-09-26 RX ORDER — POTASSIUM CHLORIDE 750 MG/1
40 CAPSULE, EXTENDED RELEASE ORAL AS NEEDED
Status: DISCONTINUED | OUTPATIENT
Start: 2022-09-26 | End: 2022-09-29 | Stop reason: HOSPADM

## 2022-09-26 RX ORDER — SODIUM CHLORIDE 0.9 % (FLUSH) 0.9 %
10 SYRINGE (ML) INJECTION EVERY 12 HOURS SCHEDULED
Status: DISCONTINUED | OUTPATIENT
Start: 2022-09-26 | End: 2022-09-29 | Stop reason: HOSPADM

## 2022-09-26 RX ORDER — NICOTINE POLACRILEX 4 MG
15 LOZENGE BUCCAL
Status: DISCONTINUED | OUTPATIENT
Start: 2022-09-26 | End: 2022-09-29 | Stop reason: HOSPADM

## 2022-09-26 RX ORDER — LANOLIN ALCOHOL/MO/W.PET/CERES
1000 CREAM (GRAM) TOPICAL DAILY
Status: DISCONTINUED | OUTPATIENT
Start: 2022-09-26 | End: 2022-09-29 | Stop reason: HOSPADM

## 2022-09-26 RX ORDER — LISINOPRIL 5 MG/1
5 TABLET ORAL DAILY
Status: DISCONTINUED | OUTPATIENT
Start: 2022-09-26 | End: 2022-09-27

## 2022-09-26 RX ORDER — FERROUS SULFATE TAB EC 324 MG (65 MG FE EQUIVALENT) 324 (65 FE) MG
324 TABLET DELAYED RESPONSE ORAL
Status: DISCONTINUED | OUTPATIENT
Start: 2022-09-27 | End: 2022-09-29 | Stop reason: HOSPADM

## 2022-09-26 RX ORDER — CLOPIDOGREL BISULFATE 75 MG/1
75 TABLET ORAL DAILY
Status: DISCONTINUED | OUTPATIENT
Start: 2022-09-26 | End: 2022-09-26

## 2022-09-26 RX ORDER — POTASSIUM CHLORIDE 1.5 G/1.77G
40 POWDER, FOR SOLUTION ORAL AS NEEDED
Status: DISCONTINUED | OUTPATIENT
Start: 2022-09-26 | End: 2022-09-29 | Stop reason: HOSPADM

## 2022-09-26 RX ORDER — DEXTROSE MONOHYDRATE 25 G/50ML
25 INJECTION, SOLUTION INTRAVENOUS
Status: DISCONTINUED | OUTPATIENT
Start: 2022-09-26 | End: 2022-09-29 | Stop reason: HOSPADM

## 2022-09-26 RX ORDER — POTASSIUM CHLORIDE 7.45 MG/ML
10 INJECTION INTRAVENOUS
Status: DISCONTINUED | OUTPATIENT
Start: 2022-09-26 | End: 2022-09-29 | Stop reason: HOSPADM

## 2022-09-26 RX ORDER — HEPARIN SODIUM 5000 [USP'U]/ML
4000 INJECTION, SOLUTION INTRAVENOUS; SUBCUTANEOUS AS NEEDED
Status: DISCONTINUED | OUTPATIENT
Start: 2022-09-26 | End: 2022-09-27

## 2022-09-26 RX ORDER — AMLODIPINE BESYLATE 10 MG/1
10 TABLET ORAL DAILY
Status: DISCONTINUED | OUTPATIENT
Start: 2022-09-26 | End: 2022-09-27

## 2022-09-26 RX ORDER — ACETAMINOPHEN 325 MG/1
650 TABLET ORAL EVERY 8 HOURS PRN
Status: DISCONTINUED | OUTPATIENT
Start: 2022-09-26 | End: 2022-09-27 | Stop reason: SDUPTHER

## 2022-09-26 RX ORDER — ASPIRIN 81 MG/1
81 TABLET ORAL DAILY
Status: DISCONTINUED | OUTPATIENT
Start: 2022-09-26 | End: 2022-09-29 | Stop reason: HOSPADM

## 2022-09-26 RX ORDER — INSULIN ASPART 100 [IU]/ML
0-9 INJECTION, SOLUTION INTRAVENOUS; SUBCUTANEOUS
Status: DISCONTINUED | OUTPATIENT
Start: 2022-09-26 | End: 2022-09-29 | Stop reason: HOSPADM

## 2022-09-26 RX ORDER — HEPARIN SODIUM 10000 [USP'U]/100ML
12 INJECTION, SOLUTION INTRAVENOUS
Status: DISCONTINUED | OUTPATIENT
Start: 2022-09-26 | End: 2022-09-27

## 2022-09-26 RX ORDER — MELATONIN
2000 DAILY
Status: DISCONTINUED | OUTPATIENT
Start: 2022-09-26 | End: 2022-09-29 | Stop reason: HOSPADM

## 2022-09-26 RX ORDER — CHLORTHALIDONE 25 MG/1
25 TABLET ORAL DAILY
Status: DISCONTINUED | OUTPATIENT
Start: 2022-09-26 | End: 2022-09-27

## 2022-09-26 RX ORDER — CARVEDILOL 6.25 MG/1
6.25 TABLET ORAL 2 TIMES DAILY WITH MEALS
Status: DISCONTINUED | OUTPATIENT
Start: 2022-09-26 | End: 2022-09-27

## 2022-09-26 RX ORDER — SODIUM CHLORIDE 0.9 % (FLUSH) 0.9 %
10 SYRINGE (ML) INJECTION AS NEEDED
Status: DISCONTINUED | OUTPATIENT
Start: 2022-09-26 | End: 2022-09-29 | Stop reason: HOSPADM

## 2022-09-26 RX ORDER — ATORVASTATIN CALCIUM 40 MG/1
40 TABLET, FILM COATED ORAL EVERY EVENING
Status: DISCONTINUED | OUTPATIENT
Start: 2022-09-26 | End: 2022-09-29 | Stop reason: HOSPADM

## 2022-09-26 RX ORDER — HEPARIN SODIUM 5000 [USP'U]/ML
30 INJECTION, SOLUTION INTRAVENOUS; SUBCUTANEOUS AS NEEDED
Status: DISCONTINUED | OUTPATIENT
Start: 2022-09-26 | End: 2022-09-27

## 2022-09-26 RX ADMIN — Medication 1 TABLET: at 17:50

## 2022-09-26 RX ADMIN — ASPIRIN 81 MG: 81 TABLET, FILM COATED ORAL at 17:52

## 2022-09-26 RX ADMIN — MAGNESIUM OXIDE TAB 400 MG (241.3 MG ELEMENTAL MG) 400 MG: 400 (241.3 MG) TAB at 17:51

## 2022-09-26 RX ADMIN — Medication 10 ML: at 20:59

## 2022-09-26 RX ADMIN — Medication 2000 UNITS: at 17:52

## 2022-09-26 RX ADMIN — CYANOCOBALAMIN TAB 1000 MCG 1000 MCG: 1000 TAB at 17:52

## 2022-09-26 RX ADMIN — HEPARIN SODIUM 2700 UNITS: 5000 INJECTION INTRAVENOUS; SUBCUTANEOUS at 18:16

## 2022-09-26 RX ADMIN — ATORVASTATIN CALCIUM 40 MG: 40 TABLET, FILM COATED ORAL at 17:50

## 2022-09-26 RX ADMIN — HEPARIN SODIUM 12 UNITS/KG/HR: 10000 INJECTION, SOLUTION INTRAVENOUS at 18:06

## 2022-09-26 RX ADMIN — LISINOPRIL 5 MG: 5 TABLET ORAL at 17:52

## 2022-09-26 RX ADMIN — CARVEDILOL 6.25 MG: 6.25 TABLET, FILM COATED ORAL at 17:51

## 2022-09-26 RX ADMIN — CHLORTHALIDONE 25 MG: 25 TABLET ORAL at 18:05

## 2022-09-26 RX ADMIN — POTASSIUM CHLORIDE 40 MEQ: 750 CAPSULE, EXTENDED RELEASE ORAL at 20:59

## 2022-09-27 ENCOUNTER — APPOINTMENT (OUTPATIENT)
Dept: CARDIOLOGY | Facility: HOSPITAL | Age: 72
End: 2022-09-27

## 2022-09-27 LAB
ANION GAP SERPL CALCULATED.3IONS-SCNC: 10 MMOL/L (ref 5–15)
APTT PPP: 105.3 SECONDS (ref 20–40.3)
APTT PPP: 98.6 SECONDS (ref 20–40.3)
BASOPHILS # BLD AUTO: 0.03 10*3/MM3 (ref 0–0.2)
BASOPHILS NFR BLD AUTO: 0.3 % (ref 0–1.5)
BH CV ECHO MEAS - ACS: 1.69 CM
BH CV ECHO MEAS - AI P1/2T: 499.3 MSEC
BH CV ECHO MEAS - AO MAX PG: 8.6 MMHG
BH CV ECHO MEAS - AO MEAN PG: 5.1 MMHG
BH CV ECHO MEAS - AO ROOT DIAM: 2.8 CM
BH CV ECHO MEAS - AO V2 MAX: 144.2 CM/SEC
BH CV ECHO MEAS - AO V2 VTI: 20.6 CM
BH CV ECHO MEAS - AVA(I,D): 1.69 CM2
BH CV ECHO MEAS - EDV(CUBED): 56 ML
BH CV ECHO MEAS - EDV(MOD-SP2): 71.7 ML
BH CV ECHO MEAS - EDV(MOD-SP4): 54.9 ML
BH CV ECHO MEAS - EF(MOD-SP2): 46.4 %
BH CV ECHO MEAS - EF(MOD-SP4): 43.7 %
BH CV ECHO MEAS - ESV(CUBED): 28.8 ML
BH CV ECHO MEAS - ESV(MOD-SP2): 38.4 ML
BH CV ECHO MEAS - ESV(MOD-SP4): 30.9 ML
BH CV ECHO MEAS - FS: 19.8 %
BH CV ECHO MEAS - IVS/LVPW: 0.94 CM
BH CV ECHO MEAS - IVSD: 1.72 CM
BH CV ECHO MEAS - LA DIMENSION: 4.4 CM
BH CV ECHO MEAS - LV DIASTOLIC VOL/BSA (35-75): 28.2 CM2
BH CV ECHO MEAS - LV MASS(C)D: 289.7 GRAMS
BH CV ECHO MEAS - LV MAX PG: 3.6 MMHG
BH CV ECHO MEAS - LV MEAN PG: 1.89 MMHG
BH CV ECHO MEAS - LV SYSTOLIC VOL/BSA (12-30): 15.9 CM2
BH CV ECHO MEAS - LV V1 MAX: 94.1 CM/SEC
BH CV ECHO MEAS - LV V1 VTI: 13.5 CM
BH CV ECHO MEAS - LVIDD: 3.8 CM
BH CV ECHO MEAS - LVIDS: 3.1 CM
BH CV ECHO MEAS - LVOT AREA: 2.6 CM2
BH CV ECHO MEAS - LVOT DIAM: 1.81 CM
BH CV ECHO MEAS - LVPWD: 1.84 CM
BH CV ECHO MEAS - MR MAX PG: 51 MMHG
BH CV ECHO MEAS - MR MAX VEL: 356.3 CM/SEC
BH CV ECHO MEAS - MV DEC SLOPE: 625 CM/SEC2
BH CV ECHO MEAS - MV MAX PG: 4.3 MMHG
BH CV ECHO MEAS - MV MEAN PG: 1.47 MMHG
BH CV ECHO MEAS - MV P1/2T: 45.8 MSEC
BH CV ECHO MEAS - MV V2 VTI: 18.3 CM
BH CV ECHO MEAS - MVA(P1/2T): 4.8 CM2
BH CV ECHO MEAS - MVA(VTI): 1.9 CM2
BH CV ECHO MEAS - PA V2 MAX: 110.6 CM/SEC
BH CV ECHO MEAS - RAP SYSTOLE: 3 MMHG
BH CV ECHO MEAS - RVDD: 2.39 CM
BH CV ECHO MEAS - RVSP: 28.9 MMHG
BH CV ECHO MEAS - SI(MOD-SP2): 17.1 ML/M2
BH CV ECHO MEAS - SI(MOD-SP4): 12.3 ML/M2
BH CV ECHO MEAS - SV(LVOT): 34.8 ML
BH CV ECHO MEAS - SV(MOD-SP2): 33.3 ML
BH CV ECHO MEAS - SV(MOD-SP4): 24 ML
BH CV ECHO MEAS - TAPSE (>1.6): 1.12 CM
BH CV ECHO MEAS - TR MAX PG: 25.9 MMHG
BH CV ECHO MEAS - TR MAX VEL: 253.6 CM/SEC
BUN SERPL-MCNC: 21 MG/DL (ref 8–23)
BUN/CREAT SERPL: 16.5 (ref 7–25)
CALCIUM SPEC-SCNC: 9.9 MG/DL (ref 8.6–10.5)
CHLORIDE SERPL-SCNC: 102 MMOL/L (ref 98–107)
CO2 SERPL-SCNC: 27 MMOL/L (ref 22–29)
CREAT SERPL-MCNC: 1.27 MG/DL (ref 0.57–1)
DEPRECATED RDW RBC AUTO: 41.4 FL (ref 37–54)
EGFRCR SERPLBLD CKD-EPI 2021: 44.7 ML/MIN/1.73
EOSINOPHIL # BLD AUTO: 0.08 10*3/MM3 (ref 0–0.4)
EOSINOPHIL NFR BLD AUTO: 0.7 % (ref 0.3–6.2)
ERYTHROCYTE [DISTWIDTH] IN BLOOD BY AUTOMATED COUNT: 12.9 % (ref 12.3–15.4)
GLUCOSE SERPL-MCNC: 134 MG/DL (ref 65–99)
HCT VFR BLD AUTO: 29.5 % (ref 34–46.6)
HGB BLD-MCNC: 9.8 G/DL (ref 12–15.9)
IMM GRANULOCYTES # BLD AUTO: 0.07 10*3/MM3 (ref 0–0.05)
IMM GRANULOCYTES NFR BLD AUTO: 0.7 % (ref 0–0.5)
LEFT ATRIUM VOLUME INDEX: 40.7 ML/M2
LYMPHOCYTES # BLD AUTO: 2.47 10*3/MM3 (ref 0.7–3.1)
LYMPHOCYTES NFR BLD AUTO: 23.1 % (ref 19.6–45.3)
MAXIMAL PREDICTED HEART RATE: 147 BPM
MCH RBC QN AUTO: 29.5 PG (ref 26.6–33)
MCHC RBC AUTO-ENTMCNC: 33.2 G/DL (ref 31.5–35.7)
MCV RBC AUTO: 88.9 FL (ref 79–97)
MONOCYTES # BLD AUTO: 0.74 10*3/MM3 (ref 0.1–0.9)
MONOCYTES NFR BLD AUTO: 6.9 % (ref 5–12)
NEUTROPHILS NFR BLD AUTO: 68.3 % (ref 42.7–76)
NEUTROPHILS NFR BLD AUTO: 7.28 10*3/MM3 (ref 1.7–7)
NRBC BLD AUTO-RTO: 0 /100 WBC (ref 0–0.2)
PLATELET # BLD AUTO: 327 10*3/MM3 (ref 140–450)
PMV BLD AUTO: 10.6 FL (ref 6–12)
POTASSIUM SERPL-SCNC: 4.2 MMOL/L (ref 3.5–5.2)
RBC # BLD AUTO: 3.32 10*6/MM3 (ref 3.77–5.28)
SODIUM SERPL-SCNC: 139 MMOL/L (ref 136–145)
STRESS TARGET HR: 125 BPM
TROPONIN T SERPL-MCNC: 1.74 NG/ML (ref 0–0.03)
WBC NRBC COR # BLD: 10.67 10*3/MM3 (ref 3.4–10.8)

## 2022-09-27 PROCEDURE — 85730 THROMBOPLASTIN TIME PARTIAL: CPT | Performed by: HOSPITALIST

## 2022-09-27 PROCEDURE — C1725 CATH, TRANSLUMIN NON-LASER: HCPCS | Performed by: INTERNAL MEDICINE

## 2022-09-27 PROCEDURE — 25010000002 HEPARIN (PORCINE) PER 1000 UNITS: Performed by: HOSPITALIST

## 2022-09-27 PROCEDURE — C1874 STENT, COATED/COV W/DEL SYS: HCPCS | Performed by: INTERNAL MEDICINE

## 2022-09-27 PROCEDURE — 25010000002 PHENYLEPHRINE 10 MG/ML SOLUTION: Performed by: INTERNAL MEDICINE

## 2022-09-27 PROCEDURE — 82962 GLUCOSE BLOOD TEST: CPT

## 2022-09-27 PROCEDURE — 99233 SBSQ HOSP IP/OBS HIGH 50: CPT | Performed by: INTERNAL MEDICINE

## 2022-09-27 PROCEDURE — 25010000002 FENTANYL CITRATE (PF) 50 MCG/ML SOLUTION: Performed by: INTERNAL MEDICINE

## 2022-09-27 PROCEDURE — C9600 PERC DRUG-EL COR STENT SING: HCPCS | Performed by: INTERNAL MEDICINE

## 2022-09-27 PROCEDURE — 027034Z DILATION OF CORONARY ARTERY, ONE ARTERY WITH DRUG-ELUTING INTRALUMINAL DEVICE, PERCUTANEOUS APPROACH: ICD-10-PCS | Performed by: INTERNAL MEDICINE

## 2022-09-27 PROCEDURE — 85025 COMPLETE CBC W/AUTO DIFF WBC: CPT | Performed by: HOSPITALIST

## 2022-09-27 PROCEDURE — 93306 TTE W/DOPPLER COMPLETE: CPT

## 2022-09-27 PROCEDURE — 25010000002 MIDAZOLAM PER 1 MG: Performed by: INTERNAL MEDICINE

## 2022-09-27 PROCEDURE — 84484 ASSAY OF TROPONIN QUANT: CPT | Performed by: INTERNAL MEDICINE

## 2022-09-27 PROCEDURE — 93306 TTE W/DOPPLER COMPLETE: CPT | Performed by: INTERNAL MEDICINE

## 2022-09-27 PROCEDURE — 93458 L HRT ARTERY/VENTRICLE ANGIO: CPT | Performed by: INTERNAL MEDICINE

## 2022-09-27 PROCEDURE — 0 IOPAMIDOL PER 1 ML: Performed by: INTERNAL MEDICINE

## 2022-09-27 PROCEDURE — C1769 GUIDE WIRE: HCPCS | Performed by: INTERNAL MEDICINE

## 2022-09-27 PROCEDURE — 92928 PRQ TCAT PLMT NTRAC ST 1 LES: CPT | Performed by: INTERNAL MEDICINE

## 2022-09-27 PROCEDURE — 25010000002 HEPARIN (PORCINE) PER 1000 UNITS: Performed by: INTERNAL MEDICINE

## 2022-09-27 PROCEDURE — C1887 CATHETER, GUIDING: HCPCS | Performed by: INTERNAL MEDICINE

## 2022-09-27 PROCEDURE — C1894 INTRO/SHEATH, NON-LASER: HCPCS | Performed by: INTERNAL MEDICINE

## 2022-09-27 PROCEDURE — B2111ZZ FLUOROSCOPY OF MULTIPLE CORONARY ARTERIES USING LOW OSMOLAR CONTRAST: ICD-10-PCS | Performed by: INTERNAL MEDICINE

## 2022-09-27 PROCEDURE — 80048 BASIC METABOLIC PNL TOTAL CA: CPT | Performed by: HOSPITALIST

## 2022-09-27 PROCEDURE — 25010000002 ALBUMIN HUMAN 5% PER 50 ML: Performed by: HOSPITALIST

## 2022-09-27 PROCEDURE — P9041 ALBUMIN (HUMAN),5%, 50ML: HCPCS | Performed by: HOSPITALIST

## 2022-09-27 PROCEDURE — 4A023N7 MEASUREMENT OF CARDIAC SAMPLING AND PRESSURE, LEFT HEART, PERCUTANEOUS APPROACH: ICD-10-PCS | Performed by: INTERNAL MEDICINE

## 2022-09-27 DEVICE — STNT CORNRY RESOLUTE ONYX RX 2X18MM: Type: IMPLANTABLE DEVICE | Status: FUNCTIONAL

## 2022-09-27 RX ORDER — PHENYLEPHRINE HYDROCHLORIDE 10 MG/ML
INJECTION INTRAVENOUS AS NEEDED
Status: DISCONTINUED | OUTPATIENT
Start: 2022-09-27 | End: 2022-09-27 | Stop reason: HOSPADM

## 2022-09-27 RX ORDER — ACETAMINOPHEN 325 MG/1
650 TABLET ORAL EVERY 4 HOURS PRN
Status: DISCONTINUED | OUTPATIENT
Start: 2022-09-27 | End: 2022-09-29 | Stop reason: HOSPADM

## 2022-09-27 RX ORDER — HEPARIN SODIUM 1000 [USP'U]/ML
INJECTION, SOLUTION INTRAVENOUS; SUBCUTANEOUS AS NEEDED
Status: DISCONTINUED | OUTPATIENT
Start: 2022-09-27 | End: 2022-09-27 | Stop reason: HOSPADM

## 2022-09-27 RX ORDER — HEPARIN SODIUM 10000 [USP'U]/100ML
12 INJECTION, SOLUTION INTRAVENOUS
Status: DISCONTINUED | OUTPATIENT
Start: 2022-09-27 | End: 2022-09-28

## 2022-09-27 RX ORDER — LIDOCAINE HYDROCHLORIDE 20 MG/ML
INJECTION, SOLUTION INFILTRATION; PERINEURAL AS NEEDED
Status: DISCONTINUED | OUTPATIENT
Start: 2022-09-27 | End: 2022-09-27 | Stop reason: HOSPADM

## 2022-09-27 RX ORDER — HEPARIN SODIUM 5000 [USP'U]/ML
4000 INJECTION, SOLUTION INTRAVENOUS; SUBCUTANEOUS AS NEEDED
Status: DISCONTINUED | OUTPATIENT
Start: 2022-09-27 | End: 2022-09-28

## 2022-09-27 RX ORDER — ASPIRIN 81 MG/1
TABLET, CHEWABLE ORAL AS NEEDED
Status: DISCONTINUED | OUTPATIENT
Start: 2022-09-27 | End: 2022-09-27 | Stop reason: HOSPADM

## 2022-09-27 RX ORDER — CLOPIDOGREL BISULFATE 75 MG/1
75 TABLET ORAL DAILY
Status: DISCONTINUED | OUTPATIENT
Start: 2022-09-28 | End: 2022-09-29 | Stop reason: HOSPADM

## 2022-09-27 RX ORDER — FENTANYL CITRATE 50 UG/ML
INJECTION, SOLUTION INTRAMUSCULAR; INTRAVENOUS AS NEEDED
Status: DISCONTINUED | OUTPATIENT
Start: 2022-09-27 | End: 2022-09-27 | Stop reason: HOSPADM

## 2022-09-27 RX ORDER — CLOPIDOGREL BISULFATE 75 MG/1
TABLET ORAL AS NEEDED
Status: DISCONTINUED | OUTPATIENT
Start: 2022-09-27 | End: 2022-09-27 | Stop reason: HOSPADM

## 2022-09-27 RX ORDER — HEPARIN SODIUM 5000 [USP'U]/ML
30 INJECTION, SOLUTION INTRAVENOUS; SUBCUTANEOUS AS NEEDED
Status: DISCONTINUED | OUTPATIENT
Start: 2022-09-27 | End: 2022-09-28

## 2022-09-27 RX ORDER — ALBUMIN, HUMAN INJ 5% 5 %
25 SOLUTION INTRAVENOUS ONCE
Status: COMPLETED | OUTPATIENT
Start: 2022-09-27 | End: 2022-09-27

## 2022-09-27 RX ORDER — SODIUM CHLORIDE 9 MG/ML
125 INJECTION, SOLUTION INTRAVENOUS CONTINUOUS
Status: ACTIVE | OUTPATIENT
Start: 2022-09-27 | End: 2022-09-27

## 2022-09-27 RX ORDER — MIDAZOLAM HYDROCHLORIDE 1 MG/ML
INJECTION INTRAMUSCULAR; INTRAVENOUS AS NEEDED
Status: DISCONTINUED | OUTPATIENT
Start: 2022-09-27 | End: 2022-09-27 | Stop reason: HOSPADM

## 2022-09-27 RX ORDER — CARVEDILOL 3.12 MG/1
3.12 TABLET ORAL 2 TIMES DAILY WITH MEALS
Status: DISCONTINUED | OUTPATIENT
Start: 2022-09-27 | End: 2022-09-28

## 2022-09-27 RX ADMIN — Medication 2000 UNITS: at 08:00

## 2022-09-27 RX ADMIN — ATORVASTATIN CALCIUM 40 MG: 40 TABLET, FILM COATED ORAL at 19:17

## 2022-09-27 RX ADMIN — FERROUS SULFATE TAB EC 324 MG (65 MG FE EQUIVALENT) 324 MG: 324 (65 FE) TABLET DELAYED RESPONSE at 08:00

## 2022-09-27 RX ADMIN — CYANOCOBALAMIN TAB 1000 MCG 1000 MCG: 1000 TAB at 08:01

## 2022-09-27 RX ADMIN — MAGNESIUM OXIDE TAB 400 MG (241.3 MG ELEMENTAL MG) 400 MG: 400 (241.3 MG) TAB at 08:01

## 2022-09-27 RX ADMIN — Medication 10 ML: at 08:02

## 2022-09-27 RX ADMIN — CARVEDILOL 6.25 MG: 6.25 TABLET, FILM COATED ORAL at 08:01

## 2022-09-27 RX ADMIN — LISINOPRIL 5 MG: 5 TABLET ORAL at 08:00

## 2022-09-27 RX ADMIN — POTASSIUM CHLORIDE 40 MEQ: 750 CAPSULE, EXTENDED RELEASE ORAL at 01:01

## 2022-09-27 RX ADMIN — SODIUM CHLORIDE 125 ML/HR: 9 INJECTION, SOLUTION INTRAVENOUS at 14:14

## 2022-09-27 RX ADMIN — CHLORTHALIDONE 25 MG: 25 TABLET ORAL at 08:00

## 2022-09-27 RX ADMIN — ASPIRIN 81 MG: 81 TABLET, FILM COATED ORAL at 08:01

## 2022-09-27 RX ADMIN — HEPARIN SODIUM 10 UNITS/KG/HR: 10000 INJECTION, SOLUTION INTRAVENOUS at 10:03

## 2022-09-27 RX ADMIN — Medication 1 TABLET: at 08:01

## 2022-09-27 RX ADMIN — ALBUMIN (HUMAN) 25 G: 12.5 INJECTION, SOLUTION INTRAVENOUS at 19:17

## 2022-09-27 RX ADMIN — POTASSIUM CHLORIDE 40 MEQ: 750 CAPSULE, EXTENDED RELEASE ORAL at 05:13

## 2022-09-28 LAB
ANION GAP SERPL CALCULATED.3IONS-SCNC: 11 MMOL/L (ref 5–15)
APTT PPP: 50.7 SECONDS (ref 20–40.3)
APTT PPP: 89.3 SECONDS (ref 20–40.3)
BUN SERPL-MCNC: 24 MG/DL (ref 8–23)
BUN/CREAT SERPL: 17 (ref 7–25)
CALCIUM SPEC-SCNC: 9.3 MG/DL (ref 8.6–10.5)
CHLORIDE SERPL-SCNC: 105 MMOL/L (ref 98–107)
CO2 SERPL-SCNC: 23 MMOL/L (ref 22–29)
CREAT SERPL-MCNC: 1.41 MG/DL (ref 0.57–1)
DEPRECATED RDW RBC AUTO: 43.2 FL (ref 37–54)
EGFRCR SERPLBLD CKD-EPI 2021: 39.5 ML/MIN/1.73
ERYTHROCYTE [DISTWIDTH] IN BLOOD BY AUTOMATED COUNT: 13.2 % (ref 12.3–15.4)
GLUCOSE BLDC GLUCOMTR-MCNC: 112 MG/DL (ref 70–130)
GLUCOSE SERPL-MCNC: 143 MG/DL (ref 65–99)
HCT VFR BLD AUTO: 26.3 % (ref 34–46.6)
HGB BLD-MCNC: 8.5 G/DL (ref 12–15.9)
MCH RBC QN AUTO: 29.3 PG (ref 26.6–33)
MCHC RBC AUTO-ENTMCNC: 32.3 G/DL (ref 31.5–35.7)
MCV RBC AUTO: 90.7 FL (ref 79–97)
PLATELET # BLD AUTO: 288 10*3/MM3 (ref 140–450)
PMV BLD AUTO: 11.2 FL (ref 6–12)
POTASSIUM SERPL-SCNC: 4.1 MMOL/L (ref 3.5–5.2)
RBC # BLD AUTO: 2.9 10*6/MM3 (ref 3.77–5.28)
SODIUM SERPL-SCNC: 139 MMOL/L (ref 136–145)
TROPONIN T SERPL-MCNC: 2.25 NG/ML (ref 0–0.03)
WBC NRBC COR # BLD: 9.11 10*3/MM3 (ref 3.4–10.8)

## 2022-09-28 PROCEDURE — 85730 THROMBOPLASTIN TIME PARTIAL: CPT | Performed by: INTERNAL MEDICINE

## 2022-09-28 PROCEDURE — 93005 ELECTROCARDIOGRAM TRACING: CPT | Performed by: INTERNAL MEDICINE

## 2022-09-28 PROCEDURE — 25010000002 HEPARIN (PORCINE) PER 1000 UNITS: Performed by: INTERNAL MEDICINE

## 2022-09-28 PROCEDURE — 85027 COMPLETE CBC AUTOMATED: CPT | Performed by: INTERNAL MEDICINE

## 2022-09-28 PROCEDURE — 99233 SBSQ HOSP IP/OBS HIGH 50: CPT | Performed by: INTERNAL MEDICINE

## 2022-09-28 PROCEDURE — 84484 ASSAY OF TROPONIN QUANT: CPT | Performed by: INTERNAL MEDICINE

## 2022-09-28 PROCEDURE — 82962 GLUCOSE BLOOD TEST: CPT

## 2022-09-28 PROCEDURE — 80048 BASIC METABOLIC PNL TOTAL CA: CPT | Performed by: INTERNAL MEDICINE

## 2022-09-28 RX ORDER — AMIODARONE HYDROCHLORIDE 200 MG/1
200 TABLET ORAL 2 TIMES DAILY WITH MEALS
Status: DISCONTINUED | OUTPATIENT
Start: 2022-09-28 | End: 2022-09-29

## 2022-09-28 RX ORDER — NOREPINEPHRINE BIT/0.9 % NACL 8 MG/250ML
.02-.3 INFUSION BOTTLE (ML) INTRAVENOUS
Status: DISCONTINUED | OUTPATIENT
Start: 2022-09-28 | End: 2022-09-29 | Stop reason: HOSPADM

## 2022-09-28 RX ADMIN — MAGNESIUM OXIDE TAB 400 MG (241.3 MG ELEMENTAL MG) 400 MG: 400 (241.3 MG) TAB at 08:46

## 2022-09-28 RX ADMIN — Medication 10 ML: at 08:47

## 2022-09-28 RX ADMIN — ATORVASTATIN CALCIUM 40 MG: 40 TABLET, FILM COATED ORAL at 17:23

## 2022-09-28 RX ADMIN — AMIODARONE HYDROCHLORIDE 200 MG: 200 TABLET ORAL at 17:23

## 2022-09-28 RX ADMIN — Medication 1 TABLET: at 08:46

## 2022-09-28 RX ADMIN — CLOPIDOGREL BISULFATE 75 MG: 75 TABLET ORAL at 08:46

## 2022-09-28 RX ADMIN — CYANOCOBALAMIN TAB 1000 MCG 1000 MCG: 1000 TAB at 08:46

## 2022-09-28 RX ADMIN — FERROUS SULFATE TAB EC 324 MG (65 MG FE EQUIVALENT) 324 MG: 324 (65 FE) TABLET DELAYED RESPONSE at 08:46

## 2022-09-28 RX ADMIN — Medication 10 ML: at 19:21

## 2022-09-28 RX ADMIN — Medication 2000 UNITS: at 08:46

## 2022-09-28 RX ADMIN — ASPIRIN 81 MG: 81 TABLET, FILM COATED ORAL at 08:46

## 2022-09-28 RX ADMIN — Medication 0.02 MCG/KG/MIN: at 01:20

## 2022-09-28 RX ADMIN — HEPARIN SODIUM 12 UNITS/KG/HR: 10000 INJECTION, SOLUTION INTRAVENOUS at 06:40

## 2022-09-28 RX ADMIN — APIXABAN 5 MG: 5 TABLET, FILM COATED ORAL at 19:21

## 2022-09-28 RX ADMIN — HEPARIN SODIUM 10 UNITS/KG/HR: 10000 INJECTION, SOLUTION INTRAVENOUS at 00:29

## 2022-09-28 RX ADMIN — HEPARIN SODIUM 2700 UNITS: 5000 INJECTION, SOLUTION INTRAVENOUS; SUBCUTANEOUS at 06:42

## 2022-09-29 ENCOUNTER — READMISSION MANAGEMENT (OUTPATIENT)
Dept: CALL CENTER | Facility: HOSPITAL | Age: 72
End: 2022-09-29

## 2022-09-29 VITALS
TEMPERATURE: 97 F | HEIGHT: 64 IN | RESPIRATION RATE: 16 BRPM | OXYGEN SATURATION: 99 % | SYSTOLIC BLOOD PRESSURE: 110 MMHG | HEART RATE: 84 BPM | DIASTOLIC BLOOD PRESSURE: 68 MMHG | WEIGHT: 195.11 LBS | BODY MASS INDEX: 33.31 KG/M2

## 2022-09-29 LAB
ANION GAP SERPL CALCULATED.3IONS-SCNC: 10 MMOL/L (ref 5–15)
BUN SERPL-MCNC: 18 MG/DL (ref 8–23)
BUN/CREAT SERPL: 15 (ref 7–25)
CALCIUM SPEC-SCNC: 9.6 MG/DL (ref 8.6–10.5)
CHLORIDE SERPL-SCNC: 102 MMOL/L (ref 98–107)
CO2 SERPL-SCNC: 23 MMOL/L (ref 22–29)
CREAT SERPL-MCNC: 1.2 MG/DL (ref 0.57–1)
DEPRECATED RDW RBC AUTO: 43.2 FL (ref 37–54)
EGFRCR SERPLBLD CKD-EPI 2021: 47.9 ML/MIN/1.73
ERYTHROCYTE [DISTWIDTH] IN BLOOD BY AUTOMATED COUNT: 13.2 % (ref 12.3–15.4)
GLUCOSE BLDC GLUCOMTR-MCNC: 115 MG/DL (ref 70–130)
GLUCOSE BLDC GLUCOMTR-MCNC: 129 MG/DL (ref 70–130)
GLUCOSE SERPL-MCNC: 122 MG/DL (ref 65–99)
HCT VFR BLD AUTO: 26.6 % (ref 34–46.6)
HGB BLD-MCNC: 8.6 G/DL (ref 12–15.9)
MCH RBC QN AUTO: 29 PG (ref 26.6–33)
MCHC RBC AUTO-ENTMCNC: 32.3 G/DL (ref 31.5–35.7)
MCV RBC AUTO: 89.6 FL (ref 79–97)
PLATELET # BLD AUTO: 280 10*3/MM3 (ref 140–450)
PMV BLD AUTO: 10.8 FL (ref 6–12)
POTASSIUM SERPL-SCNC: 3.8 MMOL/L (ref 3.5–5.2)
RBC # BLD AUTO: 2.97 10*6/MM3 (ref 3.77–5.28)
SODIUM SERPL-SCNC: 135 MMOL/L (ref 136–145)
TROPONIN T SERPL-MCNC: 1.99 NG/ML (ref 0–0.03)
WBC NRBC COR # BLD: 8.38 10*3/MM3 (ref 3.4–10.8)

## 2022-09-29 PROCEDURE — 84484 ASSAY OF TROPONIN QUANT: CPT | Performed by: INTERNAL MEDICINE

## 2022-09-29 PROCEDURE — 99233 SBSQ HOSP IP/OBS HIGH 50: CPT | Performed by: INTERNAL MEDICINE

## 2022-09-29 PROCEDURE — 80048 BASIC METABOLIC PNL TOTAL CA: CPT | Performed by: INTERNAL MEDICINE

## 2022-09-29 PROCEDURE — 85027 COMPLETE CBC AUTOMATED: CPT | Performed by: INTERNAL MEDICINE

## 2022-09-29 RX ORDER — LANCETS 33 GAUGE
1 EACH MISCELLANEOUS AS NEEDED
Qty: 100 EACH | Refills: 12 | Status: SHIPPED | OUTPATIENT
Start: 2022-09-29

## 2022-09-29 RX ORDER — AMIODARONE HYDROCHLORIDE 200 MG/1
200 TABLET ORAL 2 TIMES DAILY WITH MEALS
Qty: 12 TABLET | Refills: 0 | Status: SHIPPED | OUTPATIENT
Start: 2022-09-29 | End: 2022-09-29

## 2022-09-29 RX ORDER — AMIODARONE HYDROCHLORIDE 200 MG/1
200 TABLET ORAL
Status: DISCONTINUED | OUTPATIENT
Start: 2022-10-06 | End: 2022-09-29 | Stop reason: HOSPADM

## 2022-09-29 RX ORDER — CLOPIDOGREL BISULFATE 75 MG/1
75 TABLET ORAL DAILY
Qty: 30 TABLET | Refills: 0 | Status: SHIPPED | OUTPATIENT
Start: 2022-09-30 | End: 2022-10-25 | Stop reason: SDUPTHER

## 2022-09-29 RX ORDER — BLOOD-GLUCOSE METER
1 EACH MISCELLANEOUS
Qty: 1 EACH | Refills: 0 | Status: SHIPPED | OUTPATIENT
Start: 2022-09-29

## 2022-09-29 RX ORDER — PANTOPRAZOLE SODIUM 40 MG/1
40 TABLET, DELAYED RELEASE ORAL
Status: DISCONTINUED | OUTPATIENT
Start: 2022-09-29 | End: 2022-09-29 | Stop reason: HOSPADM

## 2022-09-29 RX ORDER — PANTOPRAZOLE SODIUM 40 MG/1
40 TABLET, DELAYED RELEASE ORAL DAILY
Qty: 90 TABLET | Refills: 1 | Status: SHIPPED | OUTPATIENT
Start: 2022-09-29

## 2022-09-29 RX ORDER — AMIODARONE HYDROCHLORIDE 200 MG/1
200 TABLET ORAL
Qty: 42 TABLET | Refills: 0 | Status: SHIPPED | OUTPATIENT
Start: 2022-10-06 | End: 2022-10-25 | Stop reason: SDUPTHER

## 2022-09-29 RX ORDER — CLOPIDOGREL BISULFATE 75 MG/1
75 TABLET ORAL DAILY
Qty: 30 TABLET | Refills: 0 | Status: SHIPPED | OUTPATIENT
Start: 2022-09-30 | End: 2022-09-29 | Stop reason: SDUPTHER

## 2022-09-29 RX ORDER — AMIODARONE HYDROCHLORIDE 200 MG/1
200 TABLET ORAL 2 TIMES DAILY WITH MEALS
Status: DISCONTINUED | OUTPATIENT
Start: 2022-09-29 | End: 2022-09-29 | Stop reason: HOSPADM

## 2022-09-29 RX ADMIN — Medication 1 TABLET: at 08:01

## 2022-09-29 RX ADMIN — CYANOCOBALAMIN TAB 1000 MCG 1000 MCG: 1000 TAB at 08:01

## 2022-09-29 RX ADMIN — Medication 2000 UNITS: at 08:01

## 2022-09-29 RX ADMIN — METOPROLOL TARTRATE 12.5 MG: 25 TABLET, FILM COATED ORAL at 09:12

## 2022-09-29 RX ADMIN — MAGNESIUM OXIDE TAB 400 MG (241.3 MG ELEMENTAL MG) 400 MG: 400 (241.3 MG) TAB at 08:02

## 2022-09-29 RX ADMIN — ASPIRIN 81 MG: 81 TABLET, FILM COATED ORAL at 08:01

## 2022-09-29 RX ADMIN — APIXABAN 5 MG: 5 TABLET, FILM COATED ORAL at 08:01

## 2022-09-29 RX ADMIN — AMIODARONE HYDROCHLORIDE 200 MG: 200 TABLET ORAL at 08:01

## 2022-09-29 RX ADMIN — Medication 10 ML: at 08:16

## 2022-09-29 RX ADMIN — CLOPIDOGREL BISULFATE 75 MG: 75 TABLET ORAL at 08:02

## 2022-09-29 RX ADMIN — FERROUS SULFATE TAB EC 324 MG (65 MG FE EQUIVALENT) 324 MG: 324 (65 FE) TABLET DELAYED RESPONSE at 08:01

## 2022-09-30 ENCOUNTER — DOCUMENTATION (OUTPATIENT)
Dept: CARDIAC REHAB | Facility: HOSPITAL | Age: 72
End: 2022-09-30

## 2022-09-30 NOTE — OUTREACH NOTE
Prep Survey    Flowsheet Row Responses   [facility] facility patient discharged from? [place]   Is LACE score < 7 ? No   Emergency Room discharge w/ pulse ox? No   Eligibility Readm Mgmt   Discharge diagnosis NSTEMI (   Does the patient have one of the following disease processes/diagnoses(primary or secondary)? Acute MI (STEMI,NSTEMI)   Does the patient have Home health ordered? No   Is there a DME ordered? No   Prep survey completed? Yes          LIBERTY SOTO - Registered Nurse

## 2022-10-04 LAB
QT INTERVAL: 360 MS
QT INTERVAL: 368 MS
QT INTERVAL: 384 MS
QTC INTERVAL: 452 MS
QTC INTERVAL: 455 MS
QTC INTERVAL: 459 MS

## 2022-10-05 ENCOUNTER — DOCUMENTATION (OUTPATIENT)
Dept: CARDIAC REHAB | Facility: HOSPITAL | Age: 72
End: 2022-10-05

## 2022-10-05 NOTE — PROGRESS NOTES
Calls and request info be sent to [facility] for attendance there. Done

## 2022-10-11 ENCOUNTER — READMISSION MANAGEMENT (OUTPATIENT)
Dept: CALL CENTER | Facility: HOSPITAL | Age: 72
End: 2022-10-11

## 2022-10-11 NOTE — OUTREACH NOTE
AMI Week 2 Survey    Flowsheet Row Responses   [facility] facility patient discharged from? [place]   Does the patient have one of the following disease processes/diagnoses(primary or secondary)? Acute MI (STEMI,NSTEMI)   Week 2 attempt successful? No   Unsuccessful attempts Attempt 1          JESSE MORA - Licensed Nurse

## 2022-10-18 ENCOUNTER — READMISSION MANAGEMENT (OUTPATIENT)
Dept: CALL CENTER | Facility: HOSPITAL | Age: 72
End: 2022-10-18

## 2022-10-18 NOTE — OUTREACH NOTE
AMI Week 3 Survey    Flowsheet Row Responses   [facility] facility patient discharged from? [place]   Does the patient have one of the following disease processes/diagnoses(primary or secondary)? Acute MI (STEMI,NSTEMI)   Week 3 attempt successful? No   Unsuccessful attempts Attempt 1          ZULEIKA YOUNG - Registered Nurse

## 2022-10-19 ENCOUNTER — OFFICE VISIT (OUTPATIENT)
Dept: CARDIOLOGY | Facility: CLINIC | Age: 72
End: 2022-10-19

## 2022-10-19 VITALS
BODY MASS INDEX: 34.52 KG/M2 | OXYGEN SATURATION: 99 % | HEART RATE: 103 BPM | HEIGHT: 64 IN | WEIGHT: 202.2 LBS | DIASTOLIC BLOOD PRESSURE: 90 MMHG | SYSTOLIC BLOOD PRESSURE: 140 MMHG

## 2022-10-19 DIAGNOSIS — I10 HYPERTENSION, ESSENTIAL: Primary | ICD-10-CM

## 2022-10-19 PROCEDURE — 93000 ELECTROCARDIOGRAM COMPLETE: CPT | Performed by: INTERNAL MEDICINE

## 2022-10-19 PROCEDURE — 99214 OFFICE O/P EST MOD 30 MIN: CPT | Performed by: INTERNAL MEDICINE

## 2022-10-21 ENCOUNTER — TELEPHONE (OUTPATIENT)
Dept: CARDIOLOGY | Facility: CLINIC | Age: 72
End: 2022-10-21

## 2022-10-21 NOTE — TELEPHONE ENCOUNTER
----- Message from Alma Abernathy sent at 10/21/2022  8:12 AM CDT -----  [email or web address] CALLING-needs rx's    amiodarone (PACERONE) 200 MG tablet    Furosemide 20 mg (not on medicine list)      Also needs a rx sent to [facility] ([address])    E-fax [phone]    Patient states she will not be out of medication until Wednesday of next week. I will speak with dr. Ramirez regarding the furosemide and then refills will be sent in for her.

## 2022-10-25 ENCOUNTER — READMISSION MANAGEMENT (OUTPATIENT)
Dept: CALL CENTER | Facility: HOSPITAL | Age: 72
End: 2022-10-25

## 2022-10-25 RX ORDER — CLOPIDOGREL BISULFATE 75 MG/1
75 TABLET ORAL DAILY
Qty: 90 TABLET | Refills: 0 | Status: SHIPPED | OUTPATIENT
Start: 2022-10-25 | End: 2023-02-27 | Stop reason: SDUPTHER

## 2022-10-25 RX ORDER — FUROSEMIDE 20 MG/1
20 TABLET ORAL DAILY PRN
Qty: 90 TABLET | Refills: 0 | Status: SHIPPED | OUTPATIENT
Start: 2022-10-25 | End: 2022-11-15 | Stop reason: SDUPTHER

## 2022-10-25 RX ORDER — FUROSEMIDE 20 MG/1
20 TABLET ORAL DAILY PRN
Qty: 30 TABLET | Refills: 1 | Status: SHIPPED | OUTPATIENT
Start: 2022-10-25 | End: 2022-10-25 | Stop reason: SDUPTHER

## 2022-10-25 RX ORDER — AMIODARONE HYDROCHLORIDE 200 MG/1
200 TABLET ORAL
Qty: 30 TABLET | Refills: 1 | Status: SHIPPED | OUTPATIENT
Start: 2022-10-25 | End: 2022-10-25 | Stop reason: SDUPTHER

## 2022-10-25 RX ORDER — CLOPIDOGREL BISULFATE 75 MG/1
75 TABLET ORAL DAILY
Qty: 30 TABLET | Refills: 0 | Status: SHIPPED | OUTPATIENT
Start: 2022-10-25 | End: 2022-10-25 | Stop reason: SDUPTHER

## 2022-10-25 RX ORDER — AMIODARONE HYDROCHLORIDE 200 MG/1
200 TABLET ORAL
Qty: 90 TABLET | Refills: 0 | Status: SHIPPED | OUTPATIENT
Start: 2022-10-25 | End: 2022-11-14

## 2022-10-25 NOTE — OUTREACH NOTE
AMI Week 4 Survey    Flowsheet Row Responses   [facility] patient discharged from? [place]   Does the patient have one of the following disease processes/diagnoses(primary or secondary)? Acute MI (STEMI,NSTEMI)   Week 4 attempt successful? Yes   Call start time 1003   Call end time 1006   Discharge diagnosis NSTEMI (   Is patient permission given to speak with other caregiver? Yes   List who call center can speak with dtr   Person spoke with today (if not patient) and relationship dtr   Medication alerts for this patient waiting for refills and Cardio has changed some meds per dtr.    Meds reviewed with patient/caregiver? Yes   Is the patient having any side effects they believe may be caused by any medication additions or changes? No   Is the patient taking all medications as directed (includes completed medication regime)? Yes   Has the patient kept scheduled appointments due by today? Yes   Is the patient still receiving Home Health Services? Yes   Home health comments having HH for PT   Psychosocial issues? No   Comments Pt still having SOA with exertion. Plans to have cardioversion. But otherwise doing well.   What is the patient's perception of their health status since discharge? Improving   Nursing interventions Nurse provided patient education   Is the patient/caregiver able to teach back signs and symptoms of when to call for help immediately: Sudden chest discomfort, Sudden discomfort in arms, back, neck or jaw, Shortness of breath at any time   Nursing interventions Nurse provided patient education, Advised patient to call provider   Is the patient/caregiver able to teach back lifestyle changes to help prevent MIs Regular exercise as approved by provider   Is the patient/caregiver able to teach back ways to prevent a second heart attack: Take medications, Follow up with MD   Is the patient/caregiver able to teach back the hierarchy of who to call/visit for symptoms/problems? PCP, Specialist,  Home health nurse, Urgent Care, ED, 911 Yes   Week 4 call completed? Yes   Would the patient like one additional call? No   Graduated Yes   Is the patient interested in additional calls from an ambulatory ?  NOTE:  applies to high risk patients requiring additional follow-up. No   Did the patient feel the follow up calls were helpful during their recovery period? Yes          ALISSA KILGORE - Registered Nurse

## 2022-10-26 LAB
QT INTERVAL: 352 MS
QTC INTERVAL: 461 MS

## 2022-11-02 ENCOUNTER — TELEPHONE (OUTPATIENT)
Dept: CARDIOLOGY | Facility: CLINIC | Age: 72
End: 2022-11-02

## 2022-11-02 NOTE — TELEPHONE ENCOUNTER
Attempted to contact patient. Voicemail left on her phone. I did attempt to call her daughter as well.

## 2022-11-02 NOTE — TELEPHONE ENCOUNTER
[facility] cardiac rehab contacted the office stating ms. Shaffer is having afib with rvr on exertion. Strips reviewed by dr. Ramirez. He will increase metoprolol tartrate to 25 mg two times daily. The patients daughter verbalized understanding. Message regarding the patients fatigue level was forwarded to dr. Ramirez.

## 2022-11-04 ENCOUNTER — PREP FOR SURGERY (OUTPATIENT)
Dept: OTHER | Facility: HOSPITAL | Age: 72
End: 2022-11-04

## 2022-11-04 DIAGNOSIS — I48.91 A-FIB: Primary | ICD-10-CM

## 2022-11-07 ENCOUNTER — TELEPHONE (OUTPATIENT)
Dept: CARDIOLOGY | Facility: CLINIC | Age: 72
End: 2022-11-07

## 2022-11-07 NOTE — TELEPHONE ENCOUNTER
Patient scheduled for a cardioversion on nov 15. They were instructed the patient should continue eliquis with no interruption and to continue the day of procedure. She will hold metoprolol and metformin the morning of procedure. All other medications to be taken with small sips of water that morning (including amiodarone). Nothing to eat or drink after midnight. Patient will need a . Surgery scheduler contacted for anesthesia. All other instructions were discussed with the patients daughter.

## 2022-11-09 ENCOUNTER — HOSPITAL ENCOUNTER (EMERGENCY)
Facility: HOSPITAL | Age: 72
Discharge: HOME | End: 2022-11-09
Attending: STUDENT IN AN ORGANIZED HEALTH CARE EDUCATION/TRAINING PROGRAM | Admitting: STUDENT IN AN ORGANIZED HEALTH CARE EDUCATION/TRAINING PROGRAM

## 2022-11-09 ENCOUNTER — TELEPHONE (OUTPATIENT)
Dept: CARDIOLOGY | Facility: CLINIC | Age: 72
End: 2022-11-09

## 2022-11-09 ENCOUNTER — APPOINTMENT (OUTPATIENT)
Dept: GENERAL RADIOLOGY | Facility: HOSPITAL | Age: 72
End: 2022-11-09

## 2022-11-09 VITALS
HEIGHT: 64 IN | DIASTOLIC BLOOD PRESSURE: 74 MMHG | HEART RATE: 80 BPM | BODY MASS INDEX: 33.46 KG/M2 | RESPIRATION RATE: 20 BRPM | TEMPERATURE: 97.7 F | SYSTOLIC BLOOD PRESSURE: 129 MMHG | OXYGEN SATURATION: 99 % | WEIGHT: 196 LBS

## 2022-11-09 DIAGNOSIS — R06.02 SOB (SHORTNESS OF BREATH): Primary | ICD-10-CM

## 2022-11-09 LAB
ALBUMIN SERPL-MCNC: 4.1 G/DL (ref 3.5–5.2)
ALBUMIN/GLOB SERPL: 1.3 G/DL
ALP SERPL-CCNC: 64 U/L (ref 39–117)
ALT SERPL W P-5'-P-CCNC: 20 U/L (ref 1–33)
ANION GAP SERPL CALCULATED.3IONS-SCNC: 14 MMOL/L (ref 5–15)
AST SERPL-CCNC: 36 U/L (ref 1–32)
BASOPHILS # BLD AUTO: 0.02 10*3/MM3 (ref 0–0.2)
BASOPHILS NFR BLD AUTO: 0.3 % (ref 0–1.5)
BILIRUB SERPL-MCNC: 0.6 MG/DL (ref 0–1.2)
BUN SERPL-MCNC: 20 MG/DL (ref 8–23)
BUN/CREAT SERPL: 13.7 (ref 7–25)
CALCIUM SPEC-SCNC: 10.1 MG/DL (ref 8.6–10.5)
CHLORIDE SERPL-SCNC: 102 MMOL/L (ref 98–107)
CO2 SERPL-SCNC: 24 MMOL/L (ref 22–29)
CREAT SERPL-MCNC: 1.46 MG/DL (ref 0.57–1)
DEPRECATED RDW RBC AUTO: 49.7 FL (ref 37–54)
EGFRCR SERPLBLD CKD-EPI 2021: 37.9 ML/MIN/1.73
EOSINOPHIL # BLD AUTO: 0.11 10*3/MM3 (ref 0–0.4)
EOSINOPHIL NFR BLD AUTO: 1.5 % (ref 0.3–6.2)
ERYTHROCYTE [DISTWIDTH] IN BLOOD BY AUTOMATED COUNT: 15.2 % (ref 12.3–15.4)
GLOBULIN UR ELPH-MCNC: 3.2 GM/DL
GLUCOSE SERPL-MCNC: 116 MG/DL (ref 65–99)
HCT VFR BLD AUTO: 29.8 % (ref 34–46.6)
HGB BLD-MCNC: 9.2 G/DL (ref 12–15.9)
HOLD SPECIMEN: NORMAL
HOLD SPECIMEN: NORMAL
IMM GRANULOCYTES # BLD AUTO: 0.03 10*3/MM3 (ref 0–0.05)
IMM GRANULOCYTES NFR BLD AUTO: 0.4 % (ref 0–0.5)
LYMPHOCYTES # BLD AUTO: 1.62 10*3/MM3 (ref 0.7–3.1)
LYMPHOCYTES NFR BLD AUTO: 22.5 % (ref 19.6–45.3)
MCH RBC QN AUTO: 28.3 PG (ref 26.6–33)
MCHC RBC AUTO-ENTMCNC: 30.9 G/DL (ref 31.5–35.7)
MCV RBC AUTO: 91.7 FL (ref 79–97)
MONOCYTES # BLD AUTO: 0.56 10*3/MM3 (ref 0.1–0.9)
MONOCYTES NFR BLD AUTO: 7.8 % (ref 5–12)
NEUTROPHILS NFR BLD AUTO: 4.86 10*3/MM3 (ref 1.7–7)
NEUTROPHILS NFR BLD AUTO: 67.5 % (ref 42.7–76)
NRBC BLD AUTO-RTO: 0 /100 WBC (ref 0–0.2)
NT-PROBNP SERPL-MCNC: 4325 PG/ML (ref 0–900)
PLATELET # BLD AUTO: 356 10*3/MM3 (ref 140–450)
PMV BLD AUTO: 10.9 FL (ref 6–12)
POTASSIUM SERPL-SCNC: 3.7 MMOL/L (ref 3.5–5.2)
PROT SERPL-MCNC: 7.3 G/DL (ref 6–8.5)
QT INTERVAL: 384 MS
QTC INTERVAL: 480 MS
RBC # BLD AUTO: 3.25 10*6/MM3 (ref 3.77–5.28)
SODIUM SERPL-SCNC: 140 MMOL/L (ref 136–145)
TROPONIN T SERPL-MCNC: 0.04 NG/ML (ref 0–0.03)
TROPONIN T SERPL-MCNC: 0.05 NG/ML (ref 0–0.03)
WBC NRBC COR # BLD: 7.2 10*3/MM3 (ref 3.4–10.8)
WHOLE BLOOD HOLD COAG: NORMAL
WHOLE BLOOD HOLD SPECIMEN: NORMAL

## 2022-11-09 PROCEDURE — 84484 ASSAY OF TROPONIN QUANT: CPT | Performed by: STUDENT IN AN ORGANIZED HEALTH CARE EDUCATION/TRAINING PROGRAM

## 2022-11-09 PROCEDURE — 80053 COMPREHEN METABOLIC PANEL: CPT | Performed by: STUDENT IN AN ORGANIZED HEALTH CARE EDUCATION/TRAINING PROGRAM

## 2022-11-09 PROCEDURE — 85025 COMPLETE CBC W/AUTO DIFF WBC: CPT | Performed by: STUDENT IN AN ORGANIZED HEALTH CARE EDUCATION/TRAINING PROGRAM

## 2022-11-09 PROCEDURE — 93005 ELECTROCARDIOGRAM TRACING: CPT | Performed by: STUDENT IN AN ORGANIZED HEALTH CARE EDUCATION/TRAINING PROGRAM

## 2022-11-09 PROCEDURE — 93010 ELECTROCARDIOGRAM REPORT: CPT | Performed by: INTERNAL MEDICINE

## 2022-11-09 PROCEDURE — 71046 X-RAY EXAM CHEST 2 VIEWS: CPT

## 2022-11-09 PROCEDURE — 83880 ASSAY OF NATRIURETIC PEPTIDE: CPT | Performed by: STUDENT IN AN ORGANIZED HEALTH CARE EDUCATION/TRAINING PROGRAM

## 2022-11-09 PROCEDURE — 36415 COLL VENOUS BLD VENIPUNCTURE: CPT

## 2022-11-09 PROCEDURE — 99284 EMERGENCY DEPT VISIT MOD MDM: CPT

## 2022-11-09 RX ORDER — SODIUM CHLORIDE 0.9 % (FLUSH) 0.9 %
10 SYRINGE (ML) INJECTION AS NEEDED
Status: DISCONTINUED | OUTPATIENT
Start: 2022-11-09 | End: 2022-11-09 | Stop reason: HOSPADM

## 2022-11-09 NOTE — TELEPHONE ENCOUNTER
Patient is currently in the er. Dr. Ramirez messaged regarding this.    Kassy Sinha RegSched Rep [place], Donna L, MA  Bacilio Shaffer's daughter called stating she is having a procedure done on 11/15/22 but she is real weak,  real short of breath, and having a hard time getting out of bed. Advised patient to go to the ER. Return number is [phone]. Thanks.

## 2022-11-09 NOTE — ED PROVIDER NOTES
Subjective   History of Present Illness  Pt is a 73 yo female with a past medical history of CHF secondary to Afib who presents for SOB. Symptoms began approximately one week ago following a heart attack, which occurred in September 2022. Shortness of breath is  Exacerbated by ambulation. SOB is not accompanied by chest pain, cough, or leg edema.     Review of Systems   Constitutional: Negative for chills and fever.   HENT: Negative for drooling and sneezing.    Eyes: Negative for redness.   Respiratory: Positive for shortness of breath. Negative for apnea, cough, chest tightness and wheezing.    Cardiovascular: Negative for chest pain, palpitations and leg swelling.   Gastrointestinal: Positive for diarrhea. Negative for nausea and vomiting.   Genitourinary: Negative for flank pain.   Musculoskeletal: Negative for myalgias.   Skin: Negative for color change and pallor.   Neurological: Negative for dizziness, seizures, weakness, light-headedness and numbness.   Psychiatric/Behavioral: Negative for confusion.       Past Medical History:   Diagnosis Date   • Carpal tunnel syndrome    • Diabetes mellitus (HCC)    • Diabetic neuropathy (HCC)    • Female stress incontinence    • History of colonoscopy 2007   • History of mammogram 05/2011   • Hyperlipidemia    • Hypertension    • Murmur, cardiac    • Sciatica of left side    • Vitamin D deficiency        Allergies   Allergen Reactions   • Atenolol Nausea And Vomiting   • Lortab [Hydrocodone-Acetaminophen] Mental Status Change       Past Surgical History:   Procedure Laterality Date   • CARDIAC CATHETERIZATION N/A 2/23/2021    Procedure: Right Heart Cath;  Surgeon: Maximiliano Saravia MD PhD;  Location: [facility] INVASIVE LOCATION;  Service: Cardiology;  Laterality: N/A;   • CARDIAC CATHETERIZATION N/A 9/27/2022    Procedure: Left Heart Cath;  Surgeon: Bryant Ramirez MD;  Location: [facility] INVASIVE LOCATION;  Service: Cardiology;  Laterality: N/A;   • CARPAL  "TUNNEL RELEASE  08/22/2007   • HYSTERECTOMY     • INJECTION OF MEDICATION  12/08/2015    kenalog 6   • INJECTION OF MEDICATION  02/12/2015    toradol 3       Family History   Problem Relation Age of Onset   • Diabetes Other    • Heart disease Other    • Hypertension Other    • Stroke Other        Social History     Socioeconomic History   • Marital status: Single   Tobacco Use   • Smoking status: Never   • Smokeless tobacco: Never   Vaping Use   • Vaping Use: Never used   Substance and Sexual Activity   • Alcohol use: No   • Drug use: Defer   • Sexual activity: Defer           Objective    Vitals:    11/09/22 0946 11/09/22 1130 11/09/22 1153   BP: 113/70 133/69 129/73   BP Location: Right arm     Patient Position: Sitting     Pulse: 106 86 88   Resp: 20  20   Temp: 97.7 °F (36.5 °C)     TempSrc: Oral     SpO2: 100% 100% 96%   Weight: 88.9 kg (196 lb)     Height: 162.6 cm (64\")         Physical Exam  Vitals and nursing note reviewed.   Constitutional:       General: She is not in acute distress.     Appearance: She is well-developed. She is not ill-appearing, toxic-appearing or diaphoretic.   HENT:      Head: Normocephalic and atraumatic.      Right Ear: External ear normal.      Left Ear: External ear normal.      Mouth/Throat:      Mouth: Mucous membranes are moist.      Pharynx: Oropharynx is clear.   Eyes:      Conjunctiva/sclera: Conjunctivae normal.   Cardiovascular:      Rate and Rhythm: Normal rate. Rhythm irregular.      Heart sounds: No murmur heard.  Pulmonary:      Effort: Pulmonary effort is normal. No accessory muscle usage or respiratory distress.      Breath sounds: Normal breath sounds. No wheezing.   Chest:      Chest wall: No tenderness.   Abdominal:      General: Bowel sounds are normal.      Palpations: Abdomen is soft.      Tenderness: There is no abdominal tenderness (deep palpation).   Skin:     General: Skin is warm and dry.      Capillary Refill: Capillary refill takes less than 2 seconds. "   Neurological:      Mental Status: She is alert and oriented to person, place, and time.   Psychiatric:         Mood and Affect: Mood normal.         Behavior: Behavior normal.         ECG 12 Lead      Date/Time: 11/9/2022 12:18 PM  Performed by: Jorden Velez MD  Authorized by: Jorden Velez MD   Interpreted by physician  Rhythm: atrial fibrillation  Rate: normal  BPM: 94  QRS axis: normal  ST segment elevation noted on lead: none.  Clinical impression: abnormal ECG and low voltage                 ED Course         Results for orders placed or performed during the hospital encounter of 11/09/22   Comprehensive Metabolic Panel    Specimen: Blood   Result Value Ref Range    Glucose 116 (H) 65 - 99 mg/dL    BUN 20 8 - 23 mg/dL    Creatinine 1.46 (H) 0.57 - 1.00 mg/dL    Sodium 140 136 - 145 mmol/L    Potassium 3.7 3.5 - 5.2 mmol/L    Chloride 102 98 - 107 mmol/L    CO2 24.0 22.0 - 29.0 mmol/L    Calcium 10.1 8.6 - 10.5 mg/dL    Total Protein 7.3 6.0 - 8.5 g/dL    Albumin 4.10 3.50 - 5.20 g/dL    ALT (SGPT) 20 1 - 33 U/L    AST (SGOT) 36 (H) 1 - 32 U/L    Alkaline Phosphatase 64 39 - 117 U/L    Total Bilirubin 0.6 0.0 - 1.2 mg/dL    Globulin 3.2 gm/dL    A/G Ratio 1.3 g/dL    BUN/Creatinine Ratio 13.7 7.0 - 25.0    Anion Gap 14.0 5.0 - 15.0 mmol/L    eGFR 37.9 (L) >60.0 mL/min/1.73   BNP    Specimen: Blood   Result Value Ref Range    proBNP 4,325.0 (H) 0.0 - 900.0 pg/mL   Troponin    Specimen: Blood   Result Value Ref Range    Troponin T 0.050 (C) 0.000 - 0.030 ng/mL   CBC Auto Differential    Specimen: Blood   Result Value Ref Range    WBC 7.20 3.40 - 10.80 10*3/mm3    RBC 3.25 (L) 3.77 - 5.28 10*6/mm3    Hemoglobin 9.2 (L) 12.0 - 15.9 g/dL    Hematocrit 29.8 (L) 34.0 - 46.6 %    MCV 91.7 79.0 - 97.0 fL    MCH 28.3 26.6 - 33.0 pg    MCHC 30.9 (L) 31.5 - 35.7 g/dL    RDW 15.2 12.3 - 15.4 %    RDW-SD 49.7 37.0 - 54.0 fl    MPV 10.9 6.0 - 12.0 fL    Platelets 356 140 - 450 10*3/mm3    Neutrophil % 67.5 42.7 -  76.0 %    Lymphocyte % 22.5 19.6 - 45.3 %    Monocyte % 7.8 5.0 - 12.0 %    Eosinophil % 1.5 0.3 - 6.2 %    Basophil % 0.3 0.0 - 1.5 %    Immature Grans % 0.4 0.0 - 0.5 %    Neutrophils, Absolute 4.86 1.70 - 7.00 10*3/mm3    Lymphocytes, Absolute 1.62 0.70 - 3.10 10*3/mm3    Monocytes, Absolute 0.56 0.10 - 0.90 10*3/mm3    Eosinophils, Absolute 0.11 0.00 - 0.40 10*3/mm3    Basophils, Absolute 0.02 0.00 - 0.20 10*3/mm3    Immature Grans, Absolute 0.03 0.00 - 0.05 10*3/mm3    nRBC 0.0 0.0 - 0.2 /100 WBC   Troponin    Specimen: Blood   Result Value Ref Range    Troponin T 0.044 (C) 0.000 - 0.030 ng/mL   ECG 12 Lead Dyspnea   Result Value Ref Range    QT Interval 384 ms    QTC Interval 480 ms   Green Top (Gel)   Result Value Ref Range    Extra Tube Hold for add-ons.    Lavender Top   Result Value Ref Range    Extra Tube hold for add-on    Gold Top - SST   Result Value Ref Range    Extra Tube Hold for add-ons.    Light Blue Top   Result Value Ref Range    Extra Tube Hold for add-ons.      XR Chest 2 View   Final Result   Cardiomegaly without evidence for acute   cardiopulmonary process.      Electronically signed by:  Richard Britt MD  11/9/2022 10:48 AM   CST Workstation: [id number]           [facility]  Number of Diagnoses or Management Options  SOB (shortness of breath): new and requires workup  Diagnosis management comments: Vital signs are stable, afebrile.  Lab significant for downward trending troponin x2.  BNP is 4300.  Labs otherwise are unremarkable.  Chest x-ray shows cardiomegaly but no acute findings.  Patient satting well on room air.  Recommend follow-up with her PCP.  Return precautions given.  Patient states understanding and is agreeable to the plan.       Amount and/or Complexity of Data Reviewed  Decide to obtain previous medical records or to obtain history from someone other than the patient: yes        Final diagnoses:   SOB (shortness of breath)       ED Disposition  ED Disposition     ED Disposition    Discharge    Condition   Stable    Comment   --             Howie Mohamud, DO  [address]  [facility] [address]  [phone]    Schedule an appointment as soon as possible for a visit in 2 days  ER follow up         Medication List      No changes were made to your prescriptions during this visit.          Jorden Velez MD  11/09/22 6914

## 2022-11-09 NOTE — ED NOTES
Patient is at an increased risk for falls. Fall light activated, yellow falls bracelet and yellow non-skid socks placed on patient. Call light within reach and patient instructed to call for assistance. Side rails up x2, bed alarm activated, and gait belt readily accessible.

## 2022-11-13 ENCOUNTER — HOSPITAL ENCOUNTER (EMERGENCY)
Facility: HOSPITAL | Age: 72
Discharge: HOME | End: 2022-11-13
Attending: FAMILY MEDICINE | Admitting: FAMILY MEDICINE

## 2022-11-13 ENCOUNTER — APPOINTMENT (OUTPATIENT)
Dept: ULTRASOUND IMAGING | Facility: HOSPITAL | Age: 72
End: 2022-11-13

## 2022-11-13 ENCOUNTER — APPOINTMENT (OUTPATIENT)
Dept: GENERAL RADIOLOGY | Facility: HOSPITAL | Age: 72
End: 2022-11-13

## 2022-11-13 VITALS
HEIGHT: 64 IN | TEMPERATURE: 97.8 F | HEART RATE: 91 BPM | WEIGHT: 196 LBS | SYSTOLIC BLOOD PRESSURE: 134 MMHG | DIASTOLIC BLOOD PRESSURE: 89 MMHG | OXYGEN SATURATION: 94 % | RESPIRATION RATE: 18 BRPM | BODY MASS INDEX: 33.46 KG/M2

## 2022-11-13 DIAGNOSIS — M77.31 CALCANEAL SPUR OF RIGHT FOOT: ICD-10-CM

## 2022-11-13 DIAGNOSIS — M25.571 ACUTE RIGHT ANKLE PAIN: Primary | ICD-10-CM

## 2022-11-13 LAB
ALBUMIN SERPL-MCNC: 4.2 G/DL (ref 3.5–5.2)
ALBUMIN/GLOB SERPL: 1.4 G/DL
ALP SERPL-CCNC: 62 U/L (ref 39–117)
ALT SERPL W P-5'-P-CCNC: 15 U/L (ref 1–33)
ANION GAP SERPL CALCULATED.3IONS-SCNC: 15 MMOL/L (ref 5–15)
AST SERPL-CCNC: 19 U/L (ref 1–32)
BASOPHILS # BLD AUTO: 0.02 10*3/MM3 (ref 0–0.2)
BASOPHILS NFR BLD AUTO: 0.3 % (ref 0–1.5)
BILIRUB SERPL-MCNC: 0.8 MG/DL (ref 0–1.2)
BUN SERPL-MCNC: 17 MG/DL (ref 8–23)
BUN/CREAT SERPL: 13.7 (ref 7–25)
CALCIUM SPEC-SCNC: 10.1 MG/DL (ref 8.6–10.5)
CHLORIDE SERPL-SCNC: 101 MMOL/L (ref 98–107)
CO2 SERPL-SCNC: 25 MMOL/L (ref 22–29)
CREAT SERPL-MCNC: 1.24 MG/DL (ref 0.57–1)
DEPRECATED RDW RBC AUTO: 51.5 FL (ref 37–54)
EGFRCR SERPLBLD CKD-EPI 2021: 46 ML/MIN/1.73
EOSINOPHIL # BLD AUTO: 0.03 10*3/MM3 (ref 0–0.4)
EOSINOPHIL NFR BLD AUTO: 0.4 % (ref 0.3–6.2)
ERYTHROCYTE [DISTWIDTH] IN BLOOD BY AUTOMATED COUNT: 15.3 % (ref 12.3–15.4)
GLOBULIN UR ELPH-MCNC: 3 GM/DL
GLUCOSE SERPL-MCNC: 110 MG/DL (ref 65–99)
HCT VFR BLD AUTO: 28.8 % (ref 34–46.6)
HGB BLD-MCNC: 8.9 G/DL (ref 12–15.9)
HOLD SPECIMEN: NORMAL
IMM GRANULOCYTES # BLD AUTO: 0.03 10*3/MM3 (ref 0–0.05)
IMM GRANULOCYTES NFR BLD AUTO: 0.4 % (ref 0–0.5)
LYMPHOCYTES # BLD AUTO: 1.16 10*3/MM3 (ref 0.7–3.1)
LYMPHOCYTES NFR BLD AUTO: 15 % (ref 19.6–45.3)
MCH RBC QN AUTO: 28.7 PG (ref 26.6–33)
MCHC RBC AUTO-ENTMCNC: 30.9 G/DL (ref 31.5–35.7)
MCV RBC AUTO: 92.9 FL (ref 79–97)
MONOCYTES # BLD AUTO: 0.62 10*3/MM3 (ref 0.1–0.9)
MONOCYTES NFR BLD AUTO: 8 % (ref 5–12)
NEUTROPHILS NFR BLD AUTO: 5.89 10*3/MM3 (ref 1.7–7)
NEUTROPHILS NFR BLD AUTO: 75.9 % (ref 42.7–76)
NRBC BLD AUTO-RTO: 0 /100 WBC (ref 0–0.2)
NT-PROBNP SERPL-MCNC: 4583 PG/ML (ref 0–900)
PLATELET # BLD AUTO: 328 10*3/MM3 (ref 140–450)
PMV BLD AUTO: 10.8 FL (ref 6–12)
POTASSIUM SERPL-SCNC: 3.7 MMOL/L (ref 3.5–5.2)
PROT SERPL-MCNC: 7.2 G/DL (ref 6–8.5)
RBC # BLD AUTO: 3.1 10*6/MM3 (ref 3.77–5.28)
SODIUM SERPL-SCNC: 141 MMOL/L (ref 136–145)
TROPONIN T SERPL-MCNC: 0.04 NG/ML (ref 0–0.03)
WBC NRBC COR # BLD: 7.75 10*3/MM3 (ref 3.4–10.8)
WHOLE BLOOD HOLD COAG: NORMAL

## 2022-11-13 PROCEDURE — 83880 ASSAY OF NATRIURETIC PEPTIDE: CPT | Performed by: FAMILY MEDICINE

## 2022-11-13 PROCEDURE — 25010000002 MORPHINE PER 10 MG: Performed by: FAMILY MEDICINE

## 2022-11-13 PROCEDURE — 96374 THER/PROPH/DIAG INJ IV PUSH: CPT

## 2022-11-13 PROCEDURE — 71045 X-RAY EXAM CHEST 1 VIEW: CPT

## 2022-11-13 PROCEDURE — 84484 ASSAY OF TROPONIN QUANT: CPT | Performed by: FAMILY MEDICINE

## 2022-11-13 PROCEDURE — 25010000002 ONDANSETRON PER 1 MG: Performed by: FAMILY MEDICINE

## 2022-11-13 PROCEDURE — 96375 TX/PRO/DX INJ NEW DRUG ADDON: CPT

## 2022-11-13 PROCEDURE — 80053 COMPREHEN METABOLIC PANEL: CPT | Performed by: FAMILY MEDICINE

## 2022-11-13 PROCEDURE — 85025 COMPLETE CBC W/AUTO DIFF WBC: CPT | Performed by: FAMILY MEDICINE

## 2022-11-13 PROCEDURE — 99284 EMERGENCY DEPT VISIT MOD MDM: CPT

## 2022-11-13 PROCEDURE — 93971 EXTREMITY STUDY: CPT

## 2022-11-13 PROCEDURE — 73610 X-RAY EXAM OF ANKLE: CPT

## 2022-11-13 PROCEDURE — 25010000002 FUROSEMIDE PER 20 MG

## 2022-11-13 RX ORDER — FUROSEMIDE 10 MG/ML
20 INJECTION INTRAMUSCULAR; INTRAVENOUS ONCE
Status: COMPLETED | OUTPATIENT
Start: 2022-11-13 | End: 2022-11-13

## 2022-11-13 RX ORDER — MORPHINE SULFATE 2 MG/ML
2 INJECTION, SOLUTION INTRAMUSCULAR; INTRAVENOUS ONCE
Status: COMPLETED | OUTPATIENT
Start: 2022-11-13 | End: 2022-11-13

## 2022-11-13 RX ORDER — ONDANSETRON 2 MG/ML
4 INJECTION INTRAMUSCULAR; INTRAVENOUS ONCE
Status: COMPLETED | OUTPATIENT
Start: 2022-11-13 | End: 2022-11-13

## 2022-11-13 RX ORDER — SODIUM CHLORIDE 0.9 % (FLUSH) 0.9 %
10 SYRINGE (ML) INJECTION AS NEEDED
Status: DISCONTINUED | OUTPATIENT
Start: 2022-11-13 | End: 2022-11-13 | Stop reason: HOSPADM

## 2022-11-13 RX ADMIN — FUROSEMIDE 20 MG: 10 INJECTION, SOLUTION INTRAMUSCULAR; INTRAVENOUS at 12:52

## 2022-11-13 RX ADMIN — MORPHINE SULFATE 2 MG: 2 INJECTION, SOLUTION INTRAMUSCULAR; INTRAVENOUS at 11:05

## 2022-11-13 RX ADMIN — ONDANSETRON 4 MG: 2 INJECTION INTRAMUSCULAR; INTRAVENOUS at 11:05

## 2022-11-13 NOTE — DISCHARGE INSTRUCTIONS
Home to rest. Use ace wrap or neoprene sleeve to right ankle and foot for comfort. Follow up with Dr. Ramirez on Tuesday as scheduled. Follow up with orthopedics if no improvement in right ankle and foot pain in 2-3 days. Use tylenol as needed for pain. May use ice to ankle off and on today.

## 2022-11-13 NOTE — ED PROVIDER NOTES
Subjective   History of Present Illness  72 year old female presents to ER with complaint of swelling in her lower extremities, worse in the right ankle, that started Friday. She denies injury. Has a history significant for CHF, MI and stent on 9/27, and afib. She reports that she is always short of breath, no worse today. She reports medication compliance. She was taken off of her blood pressure medication after her MI and started on plavix and eliquis. Family at bedside is concerned about blood clot. She denies fever. Reports that her right foot was red yesterday. She denies tobacco, ETOH, or illicit drug use.         Review of Systems   Constitutional: Positive for fever.   HENT: Negative.    Eyes: Negative.    Respiratory: Positive for shortness of breath.    Cardiovascular: Positive for leg swelling. Negative for chest pain.   Gastrointestinal: Negative.    Endocrine: Negative.    Genitourinary: Negative.    Musculoskeletal: Positive for joint swelling.   Skin: Negative.    Allergic/Immunologic: Negative.    Neurological: Negative.    Hematological: Negative.    Psychiatric/Behavioral: Negative.        Past Medical History:   Diagnosis Date   • Carpal tunnel syndrome    • Diabetes mellitus (HCC)    • Diabetic neuropathy (HCC)    • Female stress incontinence    • History of colonoscopy 2007   • History of mammogram 05/2011   • Hyperlipidemia    • Hypertension    • Murmur, cardiac    • Sciatica of left side    • Vitamin D deficiency        Allergies   Allergen Reactions   • Atenolol Nausea And Vomiting   • Lortab [Hydrocodone-Acetaminophen] Mental Status Change       Past Surgical History:   Procedure Laterality Date   • CARDIAC CATHETERIZATION N/A 2/23/2021    Procedure: Right Heart Cath;  Surgeon: Maximiliano Saravia MD PhD;  Location: [facility] INVASIVE LOCATION;  Service: Cardiology;  Laterality: N/A;   • CARDIAC CATHETERIZATION N/A 9/27/2022    Procedure: Left Heart Cath;  Surgeon: Bryant Ramirez MD;   "Location: [facility] INVASIVE LOCATION;  Service: Cardiology;  Laterality: N/A;   • CARPAL TUNNEL RELEASE  08/22/2007   • HYSTERECTOMY     • INJECTION OF MEDICATION  12/08/2015    kenalog 6   • INJECTION OF MEDICATION  02/12/2015    toradol 3       Family History   Problem Relation Age of Onset   • Diabetes Other    • Heart disease Other    • Hypertension Other    • Stroke Other        Social History     Socioeconomic History   • Marital status: Single   Tobacco Use   • Smoking status: Never   • Smokeless tobacco: Never   Vaping Use   • Vaping Use: Never used   Substance and Sexual Activity   • Alcohol use: No   • Drug use: Defer   • Sexual activity: Defer           Objective    /83   Pulse 95   Temp 97.8 °F (36.6 °C) (Oral)   Resp 20   Ht 162.6 cm (64\")   Wt 88.9 kg (196 lb)   SpO2 97%   BMI 33.64 kg/m²     Physical Exam  Vitals and nursing note reviewed.   Constitutional:       General: She is not in acute distress.     Appearance: Normal appearance. She is not ill-appearing, toxic-appearing or diaphoretic.   HENT:      Head: Normocephalic.      Right Ear: External ear normal.      Left Ear: External ear normal.      Nose: Nose normal.      Mouth/Throat:      Mouth: Mucous membranes are moist.   Eyes:      Pupils: Pupils are equal, round, and reactive to light.   Cardiovascular:      Rate and Rhythm: Normal rate and regular rhythm.      Pulses: Normal pulses.      Heart sounds: Normal heart sounds.   Pulmonary:      Effort: Pulmonary effort is normal.      Breath sounds: Normal breath sounds.   Abdominal:      General: Bowel sounds are normal.      Palpations: Abdomen is soft.   Musculoskeletal:         General: Swelling and tenderness present. No signs of injury. Normal range of motion.      Cervical back: Normal range of motion.      Right lower leg: Edema present.      Left lower leg: Edema present.      Comments: Mild bilateral edema noted in lower extremities. Swelling and tenderness at post tib " lateral right ankle. No erythema appreciated. Increased pain with dorsoflexion and extension. Negative Homans sign.   Skin:     General: Skin is warm and dry.      Capillary Refill: Capillary refill takes less than 2 seconds.   Neurological:      Mental Status: She is alert and oriented to person, place, and time.   Psychiatric:         Mood and Affect: Mood normal.         Behavior: Behavior normal.         Thought Content: Thought content normal.         Judgment: Judgment normal.         Procedures           ED Course  ED Course as of 11/13/22 1246   Sun Nov 13, 2022   1237 Spoke with patient and family at bedside about her results and plan for discharge with follow up with her cardiologist and orthopedics. She verbalizes understanding and is agreeable with plan. [HS]      ED Course User Index  [HS] Natalia Eaton APRN      Results for orders placed or performed during the hospital encounter of 11/13/22   Comprehensive Metabolic Panel    Specimen: Blood   Result Value Ref Range    Glucose 110 (H) 65 - 99 mg/dL    BUN 17 8 - 23 mg/dL    Creatinine 1.24 (H) 0.57 - 1.00 mg/dL    Sodium 141 136 - 145 mmol/L    Potassium 3.7 3.5 - 5.2 mmol/L    Chloride 101 98 - 107 mmol/L    CO2 25.0 22.0 - 29.0 mmol/L    Calcium 10.1 8.6 - 10.5 mg/dL    Total Protein 7.2 6.0 - 8.5 g/dL    Albumin 4.20 3.50 - 5.20 g/dL    ALT (SGPT) 15 1 - 33 U/L    AST (SGOT) 19 1 - 32 U/L    Alkaline Phosphatase 62 39 - 117 U/L    Total Bilirubin 0.8 0.0 - 1.2 mg/dL    Globulin 3.0 gm/dL    A/G Ratio 1.4 g/dL    BUN/Creatinine Ratio 13.7 7.0 - 25.0    Anion Gap 15.0 5.0 - 15.0 mmol/L    eGFR 46.0 (L) >60.0 mL/min/1.73   BNP    Specimen: Blood   Result Value Ref Range    proBNP 4,583.0 (H) 0.0 - 900.0 pg/mL   Troponin    Specimen: Blood   Result Value Ref Range    Troponin T 0.036 (C) 0.000 - 0.030 ng/mL   CBC Auto Differential    Specimen: Blood   Result Value Ref Range    WBC 7.75 3.40 - 10.80 10*3/mm3    RBC 3.10 (L) 3.77 - 5.28 10*6/mm3     Hemoglobin 8.9 (L) 12.0 - 15.9 g/dL    Hematocrit 28.8 (L) 34.0 - 46.6 %    MCV 92.9 79.0 - 97.0 fL    MCH 28.7 26.6 - 33.0 pg    MCHC 30.9 (L) 31.5 - 35.7 g/dL    RDW 15.3 12.3 - 15.4 %    RDW-SD 51.5 37.0 - 54.0 fl    MPV 10.8 6.0 - 12.0 fL    Platelets 328 140 - 450 10*3/mm3    Neutrophil % 75.9 42.7 - 76.0 %    Lymphocyte % 15.0 (L) 19.6 - 45.3 %    Monocyte % 8.0 5.0 - 12.0 %    Eosinophil % 0.4 0.3 - 6.2 %    Basophil % 0.3 0.0 - 1.5 %    Immature Grans % 0.4 0.0 - 0.5 %    Neutrophils, Absolute 5.89 1.70 - 7.00 10*3/mm3    Lymphocytes, Absolute 1.16 0.70 - 3.10 10*3/mm3    Monocytes, Absolute 0.62 0.10 - 0.90 10*3/mm3    Eosinophils, Absolute 0.03 0.00 - 0.40 10*3/mm3    Basophils, Absolute 0.02 0.00 - 0.20 10*3/mm3    Immature Grans, Absolute 0.03 0.00 - 0.05 10*3/mm3    nRBC 0.0 0.0 - 0.2 /100 WBC   Gold Top - SST   Result Value Ref Range    Extra Tube Hold for add-ons.    Light Blue Top   Result Value Ref Range    Extra Tube Hold for add-ons.      XR Ankle 3+ View Right    Result Date: 11/13/2022  Three view right ankle HISTORY: Pain and swelling. No known injury. AP, lateral and oblique views obtained. COMPARISON: None FINDINGS: No fracture or dislocation. Small spur medial malleolus. Moderate-sized calcaneal spur at the insertion of the Achilles tendon. No other osseous or articular abnormality.     CONCLUSION: Small spur medial malleolus. Moderate-sized calcaneal spur at the insertion of the Achilles tendon. [id number] Electronically signed by:  Mahendra Srinivasan MD  11/13/2022 12:08 PM [facility] Workstation: [id number]    XR Chest 1 View    Result Date: 11/13/2022  PORTABLE CHEST HISTORY: Lower extremity edema. Shortness of breath. Portable AP upright film of the chest was obtained at 11:17 AM. COMPARISON: November 9, 2022. FINDINGS: EKG leads. Discoid atelectasis right lung base. Perhaps very small right pleural effusion. Stable minimal cardiomegaly. The pulmonary vasculature is not increased. No pneumothorax. No acute  osseous abnormality. Degenerative changes are present in the thoracic spine.     CONCLUSION: Discoid atelectasis right lung base. Perhaps very small right pleural effusion. Stable minimal cardiomegaly. [id number] Electronically signed by:  Mahendra Srinivasan MD  11/13/2022 12:10 PM [facility] Workstation: [id number]    US Venous Doppler Lower Extremity Right (duplex)    Result Date: 11/13/2022  PROCEDURE: Right lower extremity venous duplex COMPARISON: None HISTORY: Right lower extremity pain and swelling FINDINGS: Realtime grayscale and color-flow imaging was performed of the right lower extremity. The deep veins demonstrate normal compressibility, respiratory variation and augmentation with distal compression. No thrombus is identified.     CONCLUSION: No DVT Electronically signed by:  Andrew Rockwell MD  11/13/2022 11:20 AM CST Workstation: [id number]                                           [facility]    Final diagnoses:   Acute right ankle pain   Calcaneal spur of right foot       ED Disposition  ED Disposition     ED Disposition   Discharge    Condition   Stable    Comment   --             Howie Mohamud, DO  [address] [id number] [phone]      ER follow up, As needed    Bryant Ramirez MD  [address]  [facility] [id number] [phone]      ER follow up on Tuesday as scheduled    Robles Edouard MD  [address]  [address]tr[address] [facility] [address]  [place] [id number] [phone]      ER follow up if no improvement in symptoms         Medication List      No changes were made to your prescriptions during this visit.          Natalia Eaton, APRN  11/13/22 1246

## 2022-11-14 RX ORDER — AMIODARONE HYDROCHLORIDE 200 MG/1
200 TABLET ORAL DAILY
COMMUNITY
End: 2023-01-20 | Stop reason: SDUPTHER

## 2022-11-14 NOTE — PAT
Pre op info obtained from pt via phone interview.  Pt states she was instructed on meds for morning of surgery by MD/office.  I did instruct her to hold OTC supplements per anes protocol, understanding verbalized.

## 2022-11-15 ENCOUNTER — ANESTHESIA (OUTPATIENT)
Dept: CARDIOLOGY | Facility: HOSPITAL | Age: 72
End: 2022-11-15

## 2022-11-15 ENCOUNTER — HOSPITAL ENCOUNTER (OUTPATIENT)
Dept: CARDIOLOGY | Facility: HOSPITAL | Age: 72
Discharge: HOME | End: 2022-11-15
Attending: INTERNAL MEDICINE | Admitting: INTERNAL MEDICINE

## 2022-11-15 ENCOUNTER — TELEPHONE (OUTPATIENT)
Dept: CARDIOLOGY | Facility: CLINIC | Age: 72
End: 2022-11-15

## 2022-11-15 ENCOUNTER — HOSPITAL ENCOUNTER (OUTPATIENT)
Dept: CARDIOLOGY | Facility: HOSPITAL | Age: 72
Discharge: HOME | End: 2022-11-15

## 2022-11-15 ENCOUNTER — ANESTHESIA EVENT (OUTPATIENT)
Dept: CARDIOLOGY | Facility: HOSPITAL | Age: 72
End: 2022-11-15

## 2022-11-15 ENCOUNTER — APPOINTMENT (OUTPATIENT)
Dept: CARDIOLOGY | Facility: HOSPITAL | Age: 72
End: 2022-11-15

## 2022-11-15 VITALS — BODY MASS INDEX: 33.61 KG/M2 | HEIGHT: 64 IN | WEIGHT: 196.87 LBS

## 2022-11-15 VITALS
BODY MASS INDEX: 33.61 KG/M2 | HEART RATE: 77 BPM | WEIGHT: 196.87 LBS | DIASTOLIC BLOOD PRESSURE: 67 MMHG | HEIGHT: 64 IN | TEMPERATURE: 96.8 F | RESPIRATION RATE: 16 BRPM | SYSTOLIC BLOOD PRESSURE: 133 MMHG | OXYGEN SATURATION: 98 %

## 2022-11-15 DIAGNOSIS — I48.0 PAROXYSMAL ATRIAL FIBRILLATION: Primary | ICD-10-CM

## 2022-11-15 DIAGNOSIS — I48.91 ATRIAL FIBRILLATION, UNSPECIFIED TYPE: ICD-10-CM

## 2022-11-15 LAB
ANION GAP SERPL CALCULATED.3IONS-SCNC: 12 MMOL/L (ref 5–15)
BASOPHILS # BLD AUTO: 0.02 10*3/MM3 (ref 0–0.2)
BASOPHILS NFR BLD AUTO: 0.3 % (ref 0–1.5)
BH CV ECHO LEFT VENTRICLE GLOBAL LONGITUDINAL STRAIN: -7.6 %
BH CV ECHO MEAS - ACS: 1.74 CM
BH CV ECHO MEAS - AI P1/2T: 513.3 MSEC
BH CV ECHO MEAS - AO MAX PG: 7.2 MMHG
BH CV ECHO MEAS - AO MEAN PG: 4.2 MMHG
BH CV ECHO MEAS - AO ROOT DIAM: 2.9 CM
BH CV ECHO MEAS - AO V2 MAX: 134.5 CM/SEC
BH CV ECHO MEAS - AO V2 VTI: 24.4 CM
BH CV ECHO MEAS - AVA(I,D): 1.65 CM2
BH CV ECHO MEAS - EDV(CUBED): 68.7 ML
BH CV ECHO MEAS - EDV(MOD-SP2): 46.5 ML
BH CV ECHO MEAS - EDV(MOD-SP4): 56.4 ML
BH CV ECHO MEAS - EF(MOD-SP2): 45.8 %
BH CV ECHO MEAS - EF(MOD-SP4): 49.3 %
BH CV ECHO MEAS - ESV(CUBED): 30.8 ML
BH CV ECHO MEAS - ESV(MOD-SP2): 25.2 ML
BH CV ECHO MEAS - ESV(MOD-SP4): 28.6 ML
BH CV ECHO MEAS - FS: 23.4 %
BH CV ECHO MEAS - IVS/LVPW: 0.95 CM
BH CV ECHO MEAS - IVSD: 1.74 CM
BH CV ECHO MEAS - LA DIMENSION: 4.6 CM
BH CV ECHO MEAS - LAT PEAK E' VEL: 4.9 CM/SEC
BH CV ECHO MEAS - LV DIASTOLIC VOL/BSA (35-75): 29.1 CM2
BH CV ECHO MEAS - LV MASS(C)D: 317.5 GRAMS
BH CV ECHO MEAS - LV MAX PG: 3.5 MMHG
BH CV ECHO MEAS - LV MEAN PG: 1.77 MMHG
BH CV ECHO MEAS - LV SYSTOLIC VOL/BSA (12-30): 14.7 CM2
BH CV ECHO MEAS - LV V1 MAX: 93.1 CM/SEC
BH CV ECHO MEAS - LV V1 VTI: 15.9 CM
BH CV ECHO MEAS - LVIDD: 4.1 CM
BH CV ECHO MEAS - LVIDS: 3.1 CM
BH CV ECHO MEAS - LVOT AREA: 2.5 CM2
BH CV ECHO MEAS - LVOT DIAM: 1.8 CM
BH CV ECHO MEAS - LVPWD: 1.83 CM
BH CV ECHO MEAS - MED PEAK E' VEL: 3.4 CM/SEC
BH CV ECHO MEAS - MR MAX PG: 65.9 MMHG
BH CV ECHO MEAS - MR MAX VEL: 406 CM/SEC
BH CV ECHO MEAS - MV A MAX VEL: 26.2 CM/SEC
BH CV ECHO MEAS - MV DEC SLOPE: 838.2 CM/SEC2
BH CV ECHO MEAS - MV E MAX VEL: 110.8 CM/SEC
BH CV ECHO MEAS - MV E/A: 4.2
BH CV ECHO MEAS - MV MAX PG: 6.1 MMHG
BH CV ECHO MEAS - MV MEAN PG: 1.54 MMHG
BH CV ECHO MEAS - MV P1/2T: 38.8 MSEC
BH CV ECHO MEAS - MV V2 VTI: 25 CM
BH CV ECHO MEAS - MVA(P1/2T): 5.7 CM2
BH CV ECHO MEAS - MVA(VTI): 1.61 CM2
BH CV ECHO MEAS - PA V2 MAX: 93.4 CM/SEC
BH CV ECHO MEAS - RAP SYSTOLE: 8 MMHG
BH CV ECHO MEAS - RVDD: 2.45 CM
BH CV ECHO MEAS - RVSP: 52.4 MMHG
BH CV ECHO MEAS - SI(MOD-SP2): 11 ML/M2
BH CV ECHO MEAS - SI(MOD-SP4): 14.3 ML/M2
BH CV ECHO MEAS - SV(LVOT): 40.2 ML
BH CV ECHO MEAS - SV(MOD-SP2): 21.3 ML
BH CV ECHO MEAS - SV(MOD-SP4): 27.8 ML
BH CV ECHO MEAS - TAPSE (>1.6): 1.88 CM
BH CV ECHO MEAS - TR MAX PG: 44.4 MMHG
BH CV ECHO MEAS - TR MAX VEL: 333.3 CM/SEC
BH CV ECHO MEASUREMENTS AVERAGE E/E' RATIO: 26.7
BUN SERPL-MCNC: 22 MG/DL (ref 8–23)
BUN/CREAT SERPL: 13.8 (ref 7–25)
CALCIUM SPEC-SCNC: 9.8 MG/DL (ref 8.6–10.5)
CHLORIDE SERPL-SCNC: 102 MMOL/L (ref 98–107)
CO2 SERPL-SCNC: 26 MMOL/L (ref 22–29)
CREAT SERPL-MCNC: 1.59 MG/DL (ref 0.57–1)
DEPRECATED RDW RBC AUTO: 50.7 FL (ref 37–54)
EGFRCR SERPLBLD CKD-EPI 2021: 34.2 ML/MIN/1.73
EOSINOPHIL # BLD AUTO: 0.07 10*3/MM3 (ref 0–0.4)
EOSINOPHIL NFR BLD AUTO: 1 % (ref 0.3–6.2)
ERYTHROCYTE [DISTWIDTH] IN BLOOD BY AUTOMATED COUNT: 15.2 % (ref 12.3–15.4)
GLUCOSE BLDC GLUCOMTR-MCNC: 110 MG/DL (ref 70–130)
GLUCOSE SERPL-MCNC: 105 MG/DL (ref 65–99)
HCT VFR BLD AUTO: 27.5 % (ref 34–46.6)
HGB BLD-MCNC: 8.4 G/DL (ref 12–15.9)
IMM GRANULOCYTES # BLD AUTO: 0.02 10*3/MM3 (ref 0–0.05)
IMM GRANULOCYTES NFR BLD AUTO: 0.3 % (ref 0–0.5)
INR PPP: 2.15 (ref 0.8–1.2)
LEFT ATRIUM VOLUME INDEX: 35.8 ML/M2
LYMPHOCYTES # BLD AUTO: 1.47 10*3/MM3 (ref 0.7–3.1)
LYMPHOCYTES NFR BLD AUTO: 21.5 % (ref 19.6–45.3)
MAXIMAL PREDICTED HEART RATE: 147 BPM
MAXIMAL PREDICTED HEART RATE: 147 BPM
MCH RBC QN AUTO: 28.2 PG (ref 26.6–33)
MCHC RBC AUTO-ENTMCNC: 30.5 G/DL (ref 31.5–35.7)
MCV RBC AUTO: 92.3 FL (ref 79–97)
MONOCYTES # BLD AUTO: 0.52 10*3/MM3 (ref 0.1–0.9)
MONOCYTES NFR BLD AUTO: 7.6 % (ref 5–12)
NEUTROPHILS NFR BLD AUTO: 4.74 10*3/MM3 (ref 1.7–7)
NEUTROPHILS NFR BLD AUTO: 69.3 % (ref 42.7–76)
NRBC BLD AUTO-RTO: 0 /100 WBC (ref 0–0.2)
PLATELET # BLD AUTO: 342 10*3/MM3 (ref 140–450)
PMV BLD AUTO: 10.6 FL (ref 6–12)
POTASSIUM SERPL-SCNC: 3.5 MMOL/L (ref 3.5–5.2)
PROTHROMBIN TIME: 24 SECONDS (ref 11.1–15.3)
QT INTERVAL: 436 MS
QTC INTERVAL: 515 MS
RBC # BLD AUTO: 2.98 10*6/MM3 (ref 3.77–5.28)
SODIUM SERPL-SCNC: 140 MMOL/L (ref 136–145)
STRESS TARGET HR: 125 BPM
STRESS TARGET HR: 125 BPM
WBC NRBC COR # BLD: 6.84 10*3/MM3 (ref 3.4–10.8)

## 2022-11-15 PROCEDURE — 85610 PROTHROMBIN TIME: CPT | Performed by: INTERNAL MEDICINE

## 2022-11-15 PROCEDURE — 93356 MYOCRD STRAIN IMG SPCKL TRCK: CPT | Performed by: INTERNAL MEDICINE

## 2022-11-15 PROCEDURE — 93306 TTE W/DOPPLER COMPLETE: CPT | Performed by: INTERNAL MEDICINE

## 2022-11-15 PROCEDURE — 80048 BASIC METABOLIC PNL TOTAL CA: CPT | Performed by: INTERNAL MEDICINE

## 2022-11-15 PROCEDURE — 92960 CARDIOVERSION ELECTRIC EXT: CPT

## 2022-11-15 PROCEDURE — 85025 COMPLETE CBC W/AUTO DIFF WBC: CPT | Performed by: INTERNAL MEDICINE

## 2022-11-15 PROCEDURE — 93356 MYOCRD STRAIN IMG SPCKL TRCK: CPT

## 2022-11-15 PROCEDURE — 92960 CARDIOVERSION ELECTRIC EXT: CPT | Performed by: INTERNAL MEDICINE

## 2022-11-15 PROCEDURE — 25010000002 PROPOFOL 10 MG/ML EMULSION: Performed by: NURSE ANESTHETIST, CERTIFIED REGISTERED

## 2022-11-15 PROCEDURE — 93306 TTE W/DOPPLER COMPLETE: CPT

## 2022-11-15 PROCEDURE — 93005 ELECTROCARDIOGRAM TRACING: CPT | Performed by: INTERNAL MEDICINE

## 2022-11-15 PROCEDURE — 82962 GLUCOSE BLOOD TEST: CPT

## 2022-11-15 RX ORDER — PROPOFOL 10 MG/ML
VIAL (ML) INTRAVENOUS AS NEEDED
Status: DISCONTINUED | OUTPATIENT
Start: 2022-11-15 | End: 2022-11-15 | Stop reason: SURG

## 2022-11-15 RX ORDER — FUROSEMIDE 40 MG/1
40 TABLET ORAL 2 TIMES DAILY
Qty: 60 TABLET | Refills: 3 | Status: SHIPPED | OUTPATIENT
Start: 2022-11-15

## 2022-11-15 RX ORDER — SODIUM CHLORIDE 9 MG/ML
1000 INJECTION, SOLUTION INTRAVENOUS CONTINUOUS
Status: DISCONTINUED | OUTPATIENT
Start: 2022-11-15 | End: 2022-11-16 | Stop reason: HOSPADM

## 2022-11-15 RX ADMIN — PROPOFOL 100 MG: 10 INJECTION, EMULSION INTRAVENOUS at 08:18

## 2022-11-15 RX ADMIN — SODIUM CHLORIDE 1000 ML: 9 INJECTION, SOLUTION INTRAVENOUS at 07:05

## 2022-11-15 NOTE — INTERVAL H&P NOTE
Patient is here for electrocardioversion for atrial fibrillation.  Has been compliant with her Eliquis without missing any dosages.  Risks of cardioversion including risks of anesthesia, need for temporary pacemaker, skin injury etc. discussed with the patient.  Patient reported understanding.  H&P reviewed. The patient was examined and there are no changes to the H&P.

## 2022-11-15 NOTE — ANESTHESIA POSTPROCEDURE EVALUATION
Patient: Bacilio Shaffer    Procedure Summary     Date: 11/15/22 Room / Location: [facility] CATH LAB    Anesthesia Start: 0815 Anesthesia Stop: 0825    Procedure: CARDIOVERSION EXTERNAL IN CARDIOLOGY DEPARTMENT Diagnosis:       Atrial fibrillation, unspecified type (HCC)      (afib)    Scheduled Providers: Bryant Ramirez MD Provider: Maximus Mckeon CRNA    Anesthesia Type: MAC ASA Status: 4          Anesthesia Type: MAC    Vitals  Vitals Value Taken Time   /61 11/15/22 0822   Temp     Pulse 84 11/15/22 0822   Resp     SpO2 95 % 11/15/22 0822           Post Anesthesia Care and Evaluation    Patient location during evaluation: bedside  Patient participation: complete - patient participated  Level of consciousness: sleepy but conscious  Pain management: adequate    Airway patency: patent  Anesthetic complications: No anesthetic complications  PONV Status: none  Cardiovascular status: acceptable  Respiratory status: acceptable  Hydration status: acceptable    Comments: --------------------            11/15/22              0827    --------------------   BP:       105/58     Pulse:      84       Resp:        16        Temp:                SpO2:      95%      --------------------

## 2022-11-15 NOTE — ANESTHESIA PREPROCEDURE EVALUATION
Anesthesia Evaluation     Patient summary reviewed and Nursing notes reviewed   no history of anesthetic complications:  NPO Solid Status: > 8 hours  NPO Liquid Status: > 2 hours           Airway   Mallampati: III  TM distance: >3 FB  Neck ROM: full  Small opening and Possible difficult intubation  Dental - normal exam         Pulmonary     breath sounds clear to auscultation  (+) shortness of breath, sleep apnea, decreased breath sounds,   (-) rhonchi, wheezes, not a smoker, pulmonary embolism  Cardiovascular   Exercise tolerance: poor (<4 METS)    ECG reviewed  PT is on anticoagulation therapy  Patient on routine beta blocker and Beta blocker given within 24 hours of surgery  Rhythm: irregular  Rate: abnormal    (+) hypertension 2 medications or greater, valvular problems/murmurs TI, past MI  <3 months, CAD, cardiac stents Drug eluting stent within the past 12 months dysrhythmias Atrial Fib, CHF , hyperlipidemia,   (-) pacemaker, angina, murmur, JVD, peripheral edema, CABG, DVT    ROS comment: EKG 11/9/2022:  Atrial fibrillation  Low voltage QRS  Left anterior fascicular block  Septal infarct , age undetermined  Abnormal ECG  When compared with ECG of 19-OCT-2022 10:44,  No significant change was found    Left heart cath 9/27/2022:  Conclusion:  1.  One-vessel obstructive coronary artery disease involving 90% stenosis in a small caliber OM 2.  Patient underwent successful PCI for this lesion without complications  2.  Mildly elevated left-sided filling pressures    TTE 9/27/2022:  ? Left ventricular ejection fraction appears to be 51 - 55%. Left ventricular systolic function is low normal.  ? Left ventricular wall thickness is consistent with moderate concentric hypertrophy.  ? Estimated right ventricular systolic pressure from tricuspid regurgitation is normal (<35 mmHg).  ? Left atrial volume is severely increased.  ? Left ventricular diastolic function was indeterminate.      Neuro/Psych  (-) seizures, TIA,  CVA  GI/Hepatic/Renal/Endo    (+) obesity,  GERD,  renal disease CRI, diabetes mellitus,   (-) morbid obesity    Musculoskeletal     Abdominal   (+) obese,    Substance History      OB/GYN          Other        ROS/Med Hx Other: 10/19/2022:  Patient underwent cardiac cath for an NSTEMI last month.  It showed plaque rupture and a small caliber OM for which she underwent successful PCI.  Has been on aspirin/Plavix and Eliquis.  Is recovering well.  Chest pain has resolved but continues to have shortness of breath.  No palpitations.    Eliquis & plavix taken this AM.                      Anesthesia Plan    ASA 4     MAC     (Hgb redrawn this AM after 6.9 reading. )  intravenous induction     Anesthetic plan, risks, benefits, and alternatives have been provided, discussed and informed consent has been obtained with: patient and child.  Pre-procedure education provided  Use of blood products discussed with patient and child  Consented to blood products.   Plan discussed with CRNA.        CODE STATUS:

## 2022-11-15 NOTE — TELEPHONE ENCOUNTER
Patient given an appt to see kierra rodriguez Monday at 9:30 am. Bmp will need to be drawn that day as well.

## 2022-11-15 NOTE — TELEPHONE ENCOUNTER
----- Message from Priscila Gimenez sent at 11/15/2022  1:01 PM CST -----  Regarding: callback  Contact: [phone]  Edie Liu needs you to call her about Bacilio Shaffer's medications. She has multiple questions about the changes that were made. Return number is [phone]. Thanks     Patient instructed to take lasix 40 mg two times daily and amiodarone 200 mg one daily per dr. Ramirez.

## 2022-11-21 ENCOUNTER — OFFICE VISIT (OUTPATIENT)
Dept: CARDIOLOGY | Facility: CLINIC | Age: 72
End: 2022-11-21

## 2022-11-21 VITALS
HEART RATE: 90 BPM | BODY MASS INDEX: 33.36 KG/M2 | DIASTOLIC BLOOD PRESSURE: 80 MMHG | WEIGHT: 195.4 LBS | SYSTOLIC BLOOD PRESSURE: 132 MMHG | OXYGEN SATURATION: 99 % | HEIGHT: 64 IN

## 2022-11-21 DIAGNOSIS — I35.1 NONRHEUMATIC AORTIC VALVE INSUFFICIENCY: ICD-10-CM

## 2022-11-21 DIAGNOSIS — I34.0 NON-RHEUMATIC MITRAL REGURGITATION: ICD-10-CM

## 2022-11-21 DIAGNOSIS — I48.0 PAROXYSMAL ATRIAL FIBRILLATION: ICD-10-CM

## 2022-11-21 DIAGNOSIS — R06.02 SHORTNESS OF BREATH: ICD-10-CM

## 2022-11-21 DIAGNOSIS — I25.10 CORONARY ARTERY DISEASE INVOLVING NATIVE CORONARY ARTERY OF NATIVE HEART WITHOUT ANGINA PECTORIS: Primary | ICD-10-CM

## 2022-11-21 DIAGNOSIS — I10 HYPERTENSION, ESSENTIAL: ICD-10-CM

## 2022-11-21 PROCEDURE — 99214 OFFICE O/P EST MOD 30 MIN: CPT | Performed by: NURSE PRACTITIONER

## 2022-11-21 PROCEDURE — 93000 ELECTROCARDIOGRAM COMPLETE: CPT | Performed by: INTERNAL MEDICINE

## 2022-11-21 NOTE — PROGRESS NOTES
Hypertension, essential      History of Present Illness  The below section was copied from Dr. Ramirez's note to provide continuum of care  1.PAH  -A. PFTs 2020, restrictive  -FEV1/FVC 99% of predicted  -FVC 62% of predicted  -TLC 76% of predicted  -DLCO 45% of predicted  2. MR  3. TR  4. AI  5.  Risks: HTN, HLD, DM2 , Obese, Snores  6.  Coronary artery disease     Bacilio Shaffer is a 72 y.o. female who presents for consultation today.  Patient was previously established with Dr. Saravia, for moderate mitral regurgitation otherwise mild aortic and tricuspid regurgitation.  She was also found to have pulmonary pressures in the low 40s in the setting of moderate mitral regurgitation.     She initially preferred noninvasive management.  She did not originally want to see a sleep physician.  it was felt that her pulmonary pressures were likely from her worsening mitral regurgitation, so she was recommended to undergo ongoing surveillance.  She was agreeable to having PFTs performed.  These were performed in 2020 and were consistent with restrictive lung physiology.  Patient's FEV1/FVC was 99% of predicted.  The patient's FVC was low at 62% of predicted with TLC 76% of predicted.  The DLCO was also low at 45% of predicted.  The patient has not had any CT imaging.  She did have a chest x-ray which did not show any evidence of ILD.     Patient also had a stress echo in January 2020 which was an EF of 51 to 55% with no evidence of induced ischemia.  Heart rate was at 73% predicted though.     Patient went a right heart cath I mean PA pressures about 25, cardiac output and cardiac index normal, pulmonary capillary wedge pressure is 11.  IL was 3.8 Wood minutes.  She has not started on any specific therapies.     She has chronic shortness of breath NYHA class II which is at baseline.  Denying orthopnea PND.     10/19/2022:  Patient underwent cardiac cath for an NSTEMI last month.  It showed plaque rupture and a small  caliber OM for which she underwent successful PCI.  Has been on aspirin/Plavix and Eliquis.  Is recovering well.  Chest pain has resolved but continues to have shortness of breath.  No palpitations.     Today, she presents with daughters for follow-up regarding cardioversion. She is currently taking Lasix 80 mg daily, metoprolol 12.5 mg twice daily, and amiodarone 200 mg daily.  She complains of some shortness of breath but reports that she is able to do her daily activities around the house.  Complains of some ankle edema.  Denies PND, orthopnea, dizziness, fluttering in chest, or syncope.    Past Medical History:   Diagnosis Date   • Carpal tunnel syndrome    • Coronary artery disease     1 stent.   is followed by    • Diabetes mellitus (HCC)    • Diabetic neuropathy (HCC)    • Female stress incontinence    • GERD (gastroesophageal reflux disease)    • History of colonoscopy 2007   • History of mammogram 05/2011   • Hyperlipidemia    • Hypertension    • Murmur, cardiac    • Sciatica of left side    • Stage 3 chronic kidney disease (HCC)    • Vitamin D deficiency      Past Surgical History:   Procedure Laterality Date   • CARDIAC CATHETERIZATION N/A 02/23/2021    Procedure: Right Heart Cath;  Surgeon: Maximiliano Saravia MD PhD;  Location: [facility] INVASIVE LOCATION;  Service: Cardiology;  Laterality: N/A;   • CARDIAC CATHETERIZATION N/A 09/27/2022    Procedure: Left Heart Cath;  Surgeon: Bryant Ramirez MD;  Location: [facility] INVASIVE LOCATION;  Service: Cardiology;  Laterality: N/A;   • CARPAL TUNNEL RELEASE Bilateral    • HYSTERECTOMY       Social History     Socioeconomic History   • Marital status: Single   Tobacco Use   • Smoking status: Never   • Smokeless tobacco: Never   Vaping Use   • Vaping Use: Never used   Substance and Sexual Activity   • Alcohol use: No   • Drug use: Never   • Sexual activity: Defer     Family History   Problem Relation Age of Onset   • Diabetes Other    • Heart  disease Other    • Hypertension Other    • Stroke Other        ALLERGIES:  Allergies   Allergen Reactions   • Atenolol Nausea And Vomiting   • Lortab [Hydrocodone-Acetaminophen] Mental Status Change       Review of Systems   Constitutional: Negative for fever, malaise/fatigue and night sweats.   HENT: Negative.    Eyes: Negative.    Cardiovascular: Positive for dyspnea on exertion (Improving) and leg swelling. Negative for irregular heartbeat, orthopnea, paroxysmal nocturnal dyspnea and syncope.   Respiratory: Positive for shortness of breath. Negative for cough.    Endocrine: Negative.    Hematologic/Lymphatic: Negative for adenopathy and bleeding problem.   Skin: Negative.    Musculoskeletal: Negative.    Gastrointestinal: Negative for abdominal pain, diarrhea, nausea and vomiting.   Neurological: Negative for dizziness, focal weakness, light-headedness and weakness.   Psychiatric/Behavioral: Negative.        Current Outpatient Medications   Medication Sig Dispense Refill   • furosemide (LASIX) 40 MG tablet Take 1 tablet by mouth 2 (Two) Times a Day. 60 tablet 3   • metoprolol tartrate (LOPRESSOR) 25 MG tablet Take one-half tablet by mouth 2 (Two) Times a Day. 90 tablet 1     No current facility-administered medications for this visit.       OBJECTIVE:    Physical Exam:   Vitals reviewed.   Constitutional:       Appearance: Healthy appearance. Well-developed, well-groomed, overweight and not in distress.   Eyes:      General: Lids are normal.      Conjunctiva/sclera: Conjunctivae normal.   HENT:      Head: Normocephalic and atraumatic.   Neck:      Thyroid: No thyromegaly.      Trachea: Trachea normal.      Comments: Minimal JVD  Pulmonary:      Effort: Pulmonary effort is normal.      Breath sounds: Normal breath sounds and air entry. No decreased air movement. No wheezing. No rhonchi.   Cardiovascular:      PMI at left midclavicular line. Normal rate. Sinus arrhythmia Normal S1 with normal intensity. Normal S2  "with normal intensity.      Murmurs: There is no murmur.      No gallop.   Edema:     Peripheral edema absent.   Skin:     General: Skin is warm and dry.      Capillary Refill: Capillary refill takes less than 2 seconds.      Coloration: Skin is not pale.   Neurological:      Mental Status: Alert, oriented to person, place, and time and oriented to person, place and time.      Gait: Gait is intact.   Psychiatric:         Attention and Perception: Attention normal.         Mood and Affect: Mood normal.         Speech: Speech normal.       Vitals:    11/21/22 0917   BP: 132/80   BP Location: Left arm   Patient Position: Sitting   Cuff Size: Adult   Pulse: 90   SpO2: 99%   Weight: 88.6 kg (195 lb 6.4 oz)   Height: 162.6 cm (64.02\")       DATA REVIEWED:   Results for orders placed during the hospital encounter of 11/15/22    Adult Transthoracic Echo Complete w/ Color, Spectral and Contrast if Necessary Per Protocol    Interpretation Summary  •  Left ventricular systolic function is mildly decreased. Left ventricular ejection fraction appears to be 46 - 50%.  •  Left ventricular diastolic function is consistent with (grade III w/high LAP) reversible restrictive pattern.  •  Left ventricular wall thickness is consistent with moderate concentric hypertrophy.  •  Left atrial volume is moderately increased.  •  Moderate mitral valve regurgitation is present.  •  Moderate tricuspid valve regurgitation is present.  •  Estimated right ventricular systolic pressure from tricuspid regurgitation is moderately elevated (45-55 mmHg).  •  Moderate pulmonary hypertension is present.      XR Chest 2 View    Result Date: 11/9/2022  Cardiomegaly without evidence for acute cardiopulmonary process. Electronically signed by:  Richard Britt MD  11/9/2022 10:48 AM [facility] Workstation: [id number]    XR Ankle 3+ View Right    Result Date: 11/13/2022  CONCLUSION: Small spur medial malleolus. Moderate-sized calcaneal spur at the insertion of the " Achilles tendon. [id number] Electronically signed by:  Mahendra Srinivasan MD  11/13/2022 12:08 PM CST Workstation: [id number]    XR Chest 1 View    Result Date: 11/13/2022  CONCLUSION: Discoid atelectasis right lung base. Perhaps very small right pleural effusion. Stable minimal cardiomegaly. [id number] Electronically signed by:  Mahendra Srinivasan MD  11/13/2022 12:10 PM CST Workstation: [id number]    US Venous Doppler Lower Extremity Right (duplex)    Result Date: 11/13/2022  CONCLUSION: No DVT Electronically signed by:  Andrew Rockwell MD  11/13/2022 11:20 AM CST Workstation: [id number]    CXR:     CT:     Labs: BMP, CBC, LIPID, TSH  Lab Results   Component Value Date    GLUCOSE 105 (H) 11/15/2022    CALCIUM 9.8 11/15/2022     11/15/2022    K 3.5 11/15/2022    CO2 26.0 11/15/2022     11/15/2022    BUN 22 11/15/2022    CREATININE 1.59 (H) 11/15/2022    EGFRIFAFRI 45 (L) 02/26/2021    BCR 13.8 11/15/2022    ANIONGAP 12.0 11/15/2022     Lab Results   Component Value Date    WBC 6.84 11/15/2022    HGB 8.4 (L) 11/15/2022    HCT 27.5 (L) 11/15/2022    MCV 92.3 11/15/2022     11/15/2022     Lab Results   Component Value Date    CHOL 137 02/09/2021    CHOL 142 01/10/2020    CHOL 173 12/06/2017     Lab Results   Component Value Date    TRIG 78 02/09/2021    TRIG 76 01/10/2020    TRIG 100 12/06/2017     Lab Results   Component Value Date    HDL 49 02/09/2021    HDL 49 01/10/2020    HDL 58 (L) 12/06/2017     No components found for: LDLCALC  Lab Results   Component Value Date    LDL 73 02/09/2021    LDL 78 01/10/2020    LDL 90 12/06/2017     No results found for: HDLLDLRATIO  No components found for: CHOLHDL  Lab Results   Component Value Date    TSH 2.080 03/05/2020     Lab Results   Component Value Date    PROBNP 4,583.0 (H) 11/13/2022     EKG:     Results for orders placed during the hospital encounter of 09/26/22     Adult Transthoracic Echo Complete w/ Color, Spectral and Contrast if Necessary Per Protocol     Interpretation  Summary  · Left ventricular ejection fraction appears to be 51 - 55%. Left ventricular systolic function is low normal.  · Left ventricular wall thickness is consistent with moderate concentric hypertrophy.  · Estimated right ventricular systolic pressure from tricuspid regurgitation is normal (<35 mmHg).  · Left atrial volume is severely increased.  · Left ventricular diastolic function was indeterminate.    The following portions of the patient's history were reviewed and updated as appropriate: allergies, current medications, past family history, past medical history, past social history, past surgical history and problem list.  Old records reviewed and pertinent information is included in the above objective data.     ASSESSMENT/PLAN:     Diagnosis Plan   1. Coronary artery disease involving native coronary artery of native heart without angina pectoris        2. Paroxysmal atrial fibrillation (HCC)        3. Shortness of breath        4. Nonrheumatic aortic valve insufficiency        5. Non-rheumatic mitral regurgitation        6. Hypertension, essential  ECG 12 Lead          1.  Coronary artery disease.  Denies chest pain.  Reports that shortness of breath has improved since cardioversion.  Status post PCI to small caliber OM for NSTEMI.    Continue Plavix  Continue metoprolol     2.  Atrial fibrillation, paroxysmal.  PE finding today with sinus arrhythmia.  EKG obtained and confirmed sinus rhythm with sinus arrhythmia.  Denies dizziness or fluttering in chest. SOA has improved slightly. [id number] score 4.  Denies bleeding or melena.     Continue amiodarone and metoprolol.    Continue Eliquis 5 mg twice daily    3.  Shortness of breath.  Improved.  No signs of fluid overload.  Vital signs stable.  Blood pressure normotensive.    Multifactorial from obesity, restrictive disease on PFTs and borderline elevated pulmonary pressures in setting of moderate mitral regurgitation.  Also has significant diastolic  dysfunction and aortic regurgitation  Optimal blood pressure control.    Decrease lasix to 40 mg daily  Encouraged to weigh daily. If weight gain >3 lbs in one day to take extra dose of lasix.  Encouraged to watch dietary sodium during the holidays.    Encouraged to wear compression stockings when up on feet to help ankle edema    4./5.   Nonrheumatic moderate mitral regurgitation/mild aortic regurgitation.  Pressure is normotensive today.  She is on metoprolol 12.5 mg twice daily.  She has been on Lasix 80 mg daily.    Continue diuretics but at decreased dose.  Instructed to take 40 mg daily.  Negative PE findings of volume overload.  Repeat echocardiogram in April 2021 with mild MR.  AI was mild     6.  Hypertension.  Normotensive today     Continue metoprolol      I spent 29 minutes caring for Bacilio on this date of service. This time includes time spent by me in the following activities: preparing for the visit, reviewing tests, obtaining and/or reviewing a separately obtained history, performing a medically appropriate examination and/or evaluation, counseling and educating the patient/family/caregiver and documenting information in the medical record  Return in about 3 months (around 2/21/2023) for Recheck.  with Dr. Ramirez      This document has been electronically signed by BRIANNA Peralta on November 21, 2022 09:33 CST   Electronically signed by BRIANNA Peralta, 11/21/22, 9:33 AM CST.

## 2022-11-22 LAB
QT INTERVAL: 432 MS
QTC INTERVAL: 510 MS

## 2023-01-20 RX ORDER — AMIODARONE HYDROCHLORIDE 200 MG/1
TABLET ORAL
Qty: 90 TABLET | Refills: 1 | Status: SHIPPED | OUTPATIENT
Start: 2023-01-20 | End: 2023-02-13

## 2023-02-13 ENCOUNTER — OFFICE VISIT (OUTPATIENT)
Dept: CARDIOLOGY | Facility: CLINIC | Age: 73
End: 2023-02-13
Payer: MEDICARE

## 2023-02-13 VITALS
HEIGHT: 64 IN | BODY MASS INDEX: 33.22 KG/M2 | OXYGEN SATURATION: 99 % | WEIGHT: 194.6 LBS | DIASTOLIC BLOOD PRESSURE: 84 MMHG | HEART RATE: 72 BPM | SYSTOLIC BLOOD PRESSURE: 140 MMHG

## 2023-02-13 DIAGNOSIS — I48.91 ATRIAL FIBRILLATION, UNSPECIFIED TYPE: Primary | ICD-10-CM

## 2023-02-13 PROCEDURE — 93000 ELECTROCARDIOGRAM COMPLETE: CPT | Performed by: INTERNAL MEDICINE

## 2023-02-13 PROCEDURE — 99214 OFFICE O/P EST MOD 30 MIN: CPT | Performed by: INTERNAL MEDICINE

## 2023-02-13 RX ORDER — LISINOPRIL 5 MG/1
5 TABLET ORAL DAILY
Qty: 30 TABLET | Refills: 11 | Status: SHIPPED | OUTPATIENT
Start: 2023-02-13 | End: 2023-02-14 | Stop reason: SDUPTHER

## 2023-02-13 NOTE — PROGRESS NOTES
[facility] Cardiology  OFFICE NOTE    Cardiovascular Medicine  Bryant Ramirez M.D., Merged with [facility], FSCAI, RPVI         No referring provider defined for this encounter.    Thank you for asking me to see Bacilio Shaffer for shortness of breath.    This is a 72 y.o. female with:    1.PAH  A. PFTs 2020, restrictive  -FEV1/FVC 99% of predicted  -FVC 62% of predicted  -TLC 76% of predicted  -DLCO 45% of predicted  2. MR  3. TR  4.AI  5.  Risks: HTN, HLD, DM2 , Obese, Snores  6.  Coronary artery disease    Bacilio Shaffer is a 72 y.o. female who presents for consultation today.  Patient was previously established with Dr. Saravia, for moderate mitral regurgitation otherwise mild aortic and tricuspid regurgitation.  She was also found to have pulmonary pressures in the low 40s in the setting of moderate mitral regurgitation.    She initially preferred noninvasive management.  She did not originally want to see a sleep physician.  it was felt that her pulmonary pressures were likely from her worsening mitral regurgitation, so she was recommended to undergo ongoing surveillance.  She was agreeable to having PFTs performed.  These were performed in 2020 and were consistent with restrictive lung physiology.  Patient's FEV1/FVC was 99% of predicted.  The patient's FVC was low at 62% of predicted with TLC 76% of predicted.  The DLCO was also low at 45% of predicted.  The patient has not had any CT imaging.  She did have a chest x-ray which did not show any evidence of ILD.     Patient also had a stress echo in January 2020 which was an EF of 51 to 55% with no evidence of induced ischemia.  Heart rate was at 73% predicted though.    Patient went a right heart cath I mean PA pressures about 25, cardiac output and cardiac index normal, pulmonary capillary wedge pressure is 11.  NY was 3.8 Wood minutes.  She has not started on any specific therapies.    She has chronic shortness of breath NYHA class II which is at  baseline.  Denying orthopnea PND.    10/19/2022:  Patient underwent cardiac cath for an NSTEMI last month.  It showed plaque rupture and a small caliber OM for which she underwent successful PCI.  Has been on aspirin/Plavix and Eliquis.  Is recovering well.  Chest pain has resolved but continues to have shortness of breath.  No palpitations.  She also had new onset A-fib at the time.    02/13/2023:  Underwent cardioversion since last visit.  Has been on amiodarone.  On Eliquis for anticoagulation.  Reports significant improvement in shortness of breath.  Denying any chest pain.  Denying any bleeding problems.  Lower extremity swelling has resolved.  Still has some lack of energy and easy fatigability.      Review of Systems - ROS  Constitution: Negative for weakness, weight gain and weight loss.   HENT: Negative for congestion.    Eyes: Negative for blurred vision.   Cardiovascular: As mentioned above  Respiratory: Negative for cough and hemoptysis.    Endocrine: Negative for polydipsia and polyuria.   Hematologic/Lymphatic: Negative for bleeding problem. Does not bruise/bleed easily.   Skin: Negative for flushing.   Musculoskeletal: Negative for neck pain and stiffness.   Gastrointestinal: Negative for abdominal pain, diarrhea, jaundice, melena, nausea and vomiting.   Genitourinary: Negative for dysuria and hematuria.   Neurological: Negative for dizziness, focal weakness and numbness.   Psychiatric/Behavioral: Negative for altered mental status and depression.     I reviewed the ROS as documented here and confirmed the accuracy of it with the patient today. 2/13/2023        All other systems were reviewed and were negative.    family history includes Diabetes in an other family member; Heart disease in an other family member; Hypertension in an other family member; Stroke in an other family member.     reports that she has never smoked. She has never used smokeless tobacco. She reports that she does not drink  alcohol and does not use drugs.    Allergies   Allergen Reactions   • Atenolol Nausea And Vomiting   • Lortab [Hydrocodone-Acetaminophen] Mental Status Change         Current Outpatient Medications:   •  acetaminophen (TYLENOL) 650 MG 8 hr tablet, Take 650 mg by mouth Every 8 (Eight) Hours As Needed for Mild Pain ., Disp: , Rfl:   •  amiodarone (PACERONE) 200 MG tablet, TAKE 1 TABLET TWICE A DAY  WITH MEALS FOR 12 DOSES,   THEN TAKE 1 TABLET DAILY, Disp: 90 tablet, Rfl: 1  •  apixaban (ELIQUIS) 5 MG tablet tablet, Take 1 tablet by mouth Every 12 (Twelve) Hours., Disp: 180 tablet, Rfl: 0  •  atorvastatin (LIPITOR) 40 MG tablet, Take 40 mg by mouth Every Evening., Disp: , Rfl:   •  Blood Glucose Monitoring Suppl (ONE TOUCH ULTRA 2) w/Device kit, Use as instructed to test blood sugar 3 (three) times a day before meals., Disp: 1 each, Rfl: 0  •  clopidogrel (PLAVIX) 75 MG tablet, Take 1 tablet by mouth Daily., Disp: 90 tablet, Rfl: 0  •  ferrous sulfate 325 (65 FE) MG tablet, Take 1 tablet by mouth Daily., Disp: , Rfl:   •  furosemide (LASIX) 40 MG tablet, Take 1 tablet by mouth 2 (Two) Times a Day. (Patient taking differently: Take 40 mg by mouth Daily.), Disp: 60 tablet, Rfl: 3  •  glucose blood (FREESTYLE LITE) test strip, Use as instructed to test blood sugar 3 (three) times a day before meals.  **One Touch**, Disp: 100 each, Rfl: 12  •  Lancets (OneTouch Delica Plus Cinhox28E) misc, Use as instructed to test blood sugar 3 (three) times a day before meals., Disp: 100 each, Rfl: 12  •  magnesium oxide (MAGOX) 400 (241.3 Mg) MG tablet tablet, Take 1 tablet by mouth Daily., Disp: , Rfl:   •  metFORMIN (GLUCOPHAGE) 500 MG tablet, Take 500 mg by mouth 2 (Two) Times a Day With Meals., Disp: , Rfl:   •  metoprolol tartrate (LOPRESSOR) 25 MG tablet, Take one-half tablet by mouth 2 (Two) Times a Day., Disp: 90 tablet, Rfl: 1  •  Multiple Vitamins-Minerals (MULTIVITAMIN WITH MINERALS) tablet tablet, Take 1 tablet by mouth  "Daily., Disp: , Rfl:   •  pantoprazole (Protonix) 40 MG EC tablet, Take 1 tablet by mouth Daily., Disp: 90 tablet, Rfl: 1  •  vitamin B-12 (CYANOCOBALAMIN) 1000 MCG tablet, Take 1,000 mcg by mouth Daily., Disp: , Rfl:   •  VITAMIN D PO, Take 2,000 Units by mouth Daily., Disp: , Rfl:     Physical Exam:  Vitals:    02/13/23 0942   BP: 140/84   BP Location: Left arm   Patient Position: Sitting   Cuff Size: Adult   SpO2: 99%   Weight: 88.3 kg (194 lb 9.6 oz)   Height: 162.6 cm (64\")   PainSc: 0-No pain     Current Pain Level: none  Pulse Ox: Normal  on room air  General: alert, appears stated age and cooperative     Body Habitus: well-nourished    HEENT: Head: Normocephalic, no lesions, without obvious abnormality. No arcus senilis, xanthelasma or xanthomas.    Neuro: alert, oriented x3  Pulses: 2+ and symmetric  JVP: Volume/Pulsation: Normal.  Normal waveforms.   Appropriate inspiratory decrease.  No Kussmaul's. No Juma's.   Carotid Exam: no bruit normal pulsation bilaterally   Carotid Volume: normal.     Respirations: no increased work of breathing   Chest:  Normal    Pulmonary:Normal   Precordium: Normal impulses. P2 is not palpable.  RV Heave: absent  LV Heave: absent  [place]:  normal size and placement  Palpable S4: absent.  Heart rate: normal    Heart Rhythm: Irregular     Heart Sounds: S1: normal  S2: normal  S3: absent   S4: absent  Opening Snap: absent    Pericardial Rub:  Absent: .    Abdomen:   Appearance: normal .  Palpation: Soft, non-tender to palpation, bowel sounds positive in all four quadrants; no guarding or rebound tenderness  Extremity: no edema.   LE Skin: no rashes  LE Hair:  normal  LE Pulses: well perfused with normal pulses in the distal extremities  Pallor on elevation: Absent. Rubor on dependency: None    I have reexamined the patient and the results are consistent with the previously documented exam. Bryant Ramirez MD     DATA REVIEWED:     EKG. I personally reviewed and interpreted the " EKG.  Normal sinus rhythm with sinus arrhythmia.    ECG/EMG Results (all)     None        ---------------------------------------------------  TTE/BIRDIE:  Results for orders placed during the hospital encounter of 11/15/22    Adult Transthoracic Echo Complete w/ Color, Spectral and Contrast if Necessary Per Protocol    Interpretation Summary  •  Left ventricular systolic function is mildly decreased. Left ventricular ejection fraction appears to be 46 - 50%.  •  Left ventricular diastolic function is consistent with (grade III w/high LAP) reversible restrictive pattern.  •  Left ventricular wall thickness is consistent with moderate concentric hypertrophy.  •  Left atrial volume is moderately increased.  •  Moderate mitral valve regurgitation is present.  •  Moderate tricuspid valve regurgitation is present.  •  Estimated right ventricular systolic pressure from tricuspid regurgitation is moderately elevated (45-55 mmHg).  •  Moderate pulmonary hypertension is present.            --------------------------------------------------------------------------------------------------  LABS:     The CVD Risk score (Sha et al., 2008) failed to calculate for the following reasons:    The patient has a prior MI, stroke, CHF, or peripheral vascular disease diagnosis         Lab Results   Component Value Date    GLUCOSE 105 (H) 11/15/2022    BUN 22 11/15/2022    CREATININE 1.59 (H) 11/15/2022    EGFRIFAFRI 45 (L) 02/26/2021    BCR 13.8 11/15/2022    K 3.5 11/15/2022    CO2 26.0 11/15/2022    CALCIUM 9.8 11/15/2022    ALBUMIN 4.20 11/13/2022    AST 19 11/13/2022    ALT 15 11/13/2022     Lab Results   Component Value Date    WBC 6.84 11/15/2022    HGB 8.4 (L) 11/15/2022    HCT 27.5 (L) 11/15/2022    MCV 92.3 11/15/2022     11/15/2022     Lab Results   Component Value Date    CHOL 137 02/09/2021    CHLPL 197 12/15/2015    TRIG 78 02/09/2021    HDL 49 02/09/2021    LDL 73 02/09/2021     Lab Results   Component Value  Date    TSH 2.080 03/05/2020     Lab Results   Component Value Date    TROPONINT 0.036 (C) 11/13/2022     Lab Results   Component Value Date    HGBA1C 6.0 (H) 12/15/2015     No results found for: DDIMER  Lab Results   Component Value Date    ALT 15 11/13/2022     Lab Results   Component Value Date    HGBA1C 6.0 (H) 12/15/2015    HGBA1C 6.3 (H) 08/03/2015    HGBA1C 6.4 (H) 03/17/2015     Lab Results   Component Value Date    MICROALBUR <1.2 01/10/2020    CREATININE 1.59 (H) 11/15/2022     No results found for: IRON, TIBC, FERRITIN  Lab Results   Component Value Date    INR 2.15 (H) 11/15/2022    INR 1.12 09/26/2022    PROTIME 24.0 (H) 11/15/2022    PROTIME 14.4 09/26/2022       Assessment/Plan     1.  Coronary artery disease:  Status post PCI to small caliber OM for NSTEMI. r on Plavix and Eliquis.  Continue metoprolol.  Echo with mild reduced LV systolic function.    2.  Atrial fibrillation: Resolved  New onset, rate is well controlled.  Now status post cardioversion.  [id number] score was 4.  Has been started on Eliquis for anticoagulation.   Stopping amiodarone to avoid long-term side effects.  Continue metoprolol.    1.  Shortness of breath: Stable  Multifactorial from obesity, restrictive disease on PFTs and borderline elevated pulmonary pressures in setting of moderate mitral regurgitation.  Also has anemia.  Systolic heart failure.  Also has significant diastolic dysfunction and aortic regurgitation  Optimal blood pressure control.    2.  Moderate mitral regurgitation/mild aortic regurgitation:  Optimal blood pressure control.  Continue to monitor.  Continues to have moderate MR.  AI was mild  Adding lisinopril 5 mg.  Repeat BMP in a couple of days.  Tolerate amlodipine.    3.  Hypertension:  When she was in the hospital blood pressure was borderline.  Carvedilol was switched to metoprolol.  Adding lisinopril.    Varicose veins, she did not get her venous ultrasound last visit.      Prevention:  Patient's  Body mass index is 33.4 kg/m². indicating that she is obese (BMI >30). Obesity-related health conditions include the following: hypertension and coronary heart disease. Obesity is newly identified. BMI is is above average; BMI management plan is completed. We discussed portion control and increasing exercise..      Bacilio Shaffer  reports that she has never smoked. She has never used smokeless tobacco..        This document has been electronically signed by Bryant Ramirez MD on February 13, 2023 09:45 CST

## 2023-02-14 RX ORDER — LISINOPRIL 5 MG/1
5 TABLET ORAL DAILY
Qty: 30 TABLET | Refills: 0 | Status: SHIPPED | OUTPATIENT
Start: 2023-02-14 | End: 2023-03-31

## 2023-02-15 ENCOUNTER — TELEPHONE (OUTPATIENT)
Dept: CARDIOLOGY | Facility: CLINIC | Age: 73
End: 2023-02-15
Payer: MEDICARE

## 2023-02-16 ENCOUNTER — TELEPHONE (OUTPATIENT)
Dept: CARDIOLOGY | Facility: CLINIC | Age: 73
End: 2023-02-16
Payer: MEDICARE

## 2023-02-16 NOTE — TELEPHONE ENCOUNTER
Ms. Shaffer has not picked up lisinopril yet. The patient will call when she starts as she will need lab work.

## 2023-02-20 ENCOUNTER — LAB (OUTPATIENT)
Dept: LAB | Facility: HOSPITAL | Age: 73
End: 2023-02-20
Payer: MEDICARE

## 2023-02-20 DIAGNOSIS — I48.91 ATRIAL FIBRILLATION, UNSPECIFIED TYPE: ICD-10-CM

## 2023-02-20 LAB
ANION GAP SERPL CALCULATED.3IONS-SCNC: 12 MMOL/L (ref 5–15)
BUN SERPL-MCNC: 18 MG/DL (ref 8–23)
BUN/CREAT SERPL: 13.1 (ref 7–25)
CALCIUM SPEC-SCNC: 9.8 MG/DL (ref 8.6–10.5)
CHLORIDE SERPL-SCNC: 100 MMOL/L (ref 98–107)
CO2 SERPL-SCNC: 27 MMOL/L (ref 22–29)
CREAT SERPL-MCNC: 1.37 MG/DL (ref 0.57–1)
EGFRCR SERPLBLD CKD-EPI 2021: 40.9 ML/MIN/1.73
GLUCOSE SERPL-MCNC: 111 MG/DL (ref 65–99)
POTASSIUM SERPL-SCNC: 3.2 MMOL/L (ref 3.5–5.2)
SODIUM SERPL-SCNC: 139 MMOL/L (ref 136–145)

## 2023-02-20 PROCEDURE — 80048 BASIC METABOLIC PNL TOTAL CA: CPT

## 2023-02-22 ENCOUNTER — TELEPHONE (OUTPATIENT)
Dept: CARDIOLOGY | Facility: CLINIC | Age: 73
End: 2023-02-22
Payer: MEDICARE

## 2023-02-22 LAB
QT INTERVAL: 428 MS
QTC INTERVAL: 468 MS

## 2023-02-22 RX ORDER — POTASSIUM CHLORIDE 20 MEQ/1
20 TABLET, EXTENDED RELEASE ORAL DAILY
Qty: 7 TABLET | Refills: 0 | Status: SHIPPED | OUTPATIENT
Start: 2023-02-22

## 2023-02-22 NOTE — TELEPHONE ENCOUNTER
----- Message from Bryant Ramirez MD sent at 2/21/2023  9:48 AM CST -----  Cr is stable. K is on the lower side. Lets start her on 20 KCL daily for 1 week  ----- Message -----  From: Lab, Background User  Sent: 2/20/2023   9:10 PM CST  To: Bryant Ramirez MD    Lab results discussed with the patient. She was advised potassium is on the lower side. Potassium 20 meq for seven days has been sent to her pharmacy.

## 2023-02-27 RX ORDER — CLOPIDOGREL BISULFATE 75 MG/1
75 TABLET ORAL DAILY
Qty: 14 TABLET | Refills: 0 | Status: SHIPPED | OUTPATIENT
Start: 2023-02-27 | End: 2023-02-27 | Stop reason: SDUPTHER

## 2023-02-27 RX ORDER — CLOPIDOGREL BISULFATE 75 MG/1
75 TABLET ORAL DAILY
Qty: 90 TABLET | Refills: 1 | Status: SHIPPED | OUTPATIENT
Start: 2023-02-27

## 2023-03-31 RX ORDER — LISINOPRIL 5 MG/1
TABLET ORAL
Qty: 30 TABLET | Refills: 0 | Status: SHIPPED | OUTPATIENT
Start: 2023-03-31

## 2023-04-07 RX ORDER — PANTOPRAZOLE SODIUM 40 MG/1
40 TABLET, DELAYED RELEASE ORAL DAILY
Qty: 90 TABLET | Refills: 1 | Status: SHIPPED | OUTPATIENT
Start: 2023-04-07

## 2023-04-11 RX ORDER — FUROSEMIDE 20 MG/1
TABLET ORAL
Qty: 90 TABLET | Refills: 0 | Status: SHIPPED | OUTPATIENT
Start: 2023-04-11

## 2023-05-02 ENCOUNTER — TELEPHONE (OUTPATIENT)
Dept: CARDIOLOGY | Facility: CLINIC | Age: 73
End: 2023-05-02
Payer: MEDICARE

## 2023-05-02 DIAGNOSIS — R04.0 NOSEBLEED: Primary | ICD-10-CM

## 2023-05-02 NOTE — TELEPHONE ENCOUNTER
Bryant Ramirez MD Oldham, Donna L, MA  Phone Number: [phone]     Can stay off plavix and Eliquis for now and see ENT           Previous Messages       ----- Message -----   From: Mary Kate Penn MA   Sent: 5/1/2023   2:18 PM CDT   To: Bryant Ramirez MD     She sneezed and had a terrible nosebleed. She went to er at [facility] and they packed it. She is on plavix and eliquis 5mg   ----- Message -----   From: Alma Abernathy   Sent: 5/1/2023   2:08 PM CDT   To: Mary Kate Penn MA     She left a message on the v/m this morning about her nosebleeds and being on two blood thinners    Patients daughter contacted and instructed the patient may stay off of plavix and eliquis right now. An urgent referral was placed to dr. Delaney for evaluation of the nosebleeds. They were agreeable to the appt.

## 2023-05-04 ENCOUNTER — OFFICE VISIT (OUTPATIENT)
Dept: OTOLARYNGOLOGY | Facility: CLINIC | Age: 73
End: 2023-05-04
Payer: MEDICARE

## 2023-05-04 ENCOUNTER — TELEPHONE (OUTPATIENT)
Dept: CARDIOLOGY | Facility: CLINIC | Age: 73
End: 2023-05-04
Payer: MEDICARE

## 2023-05-04 VITALS
DIASTOLIC BLOOD PRESSURE: 70 MMHG | SYSTOLIC BLOOD PRESSURE: 117 MMHG | HEIGHT: 64 IN | BODY MASS INDEX: 32.71 KG/M2 | WEIGHT: 191.6 LBS

## 2023-05-04 DIAGNOSIS — R04.0 NASAL BLEEDING: Primary | ICD-10-CM

## 2023-05-04 DIAGNOSIS — Z79.02 ANTIPLATELET OR ANTITHROMBOTIC LONG-TERM USE: ICD-10-CM

## 2023-05-04 NOTE — TELEPHONE ENCOUNTER
----- Message from Bryant Ramirez MD sent at 5/4/2023 11:42 AM CDT -----  Looks like ENT couldn't cauterize. Would recommend starting plavix for now and see if she can tolerate it. Can stop off eliquis, since in sinus rhythm  ----- Message -----  From: Andrew Delaney MD  Sent: 5/4/2023  10:44 AM CDT  To: Bryant Ramirez MD    Patient contacted and asked to take plavix but to hold aspirin and eliquis for now.

## 2023-05-25 NOTE — PROGRESS NOTES
Subjective   Bacilio Shaffer is a 72 y.o. female.       History of Present Illness   Patient was previously taking both Plavix and Eliquis.  Has a history of atrial fibrillation.  Sneezed and experienced right-sided epistaxis over the weekend.  Was seen elsewhere and the epistaxis eventually resolved.  Currently is holding her anticoagulants.  No previous nasal surgery or injury.      The following portions of the patient's history were reviewed and updated as appropriate: allergies, current medications, past family history, past medical history, past social history, past surgical history and problem list.     reports that she has never smoked. She has never used smokeless tobacco. She reports that she does not drink alcohol and does not use drugs.   Patient is not a tobacco user and has not been counseled for use of tobacco products      Review of Systems        Objective   Physical Exam  Nares: No fresh or old blood and no obvious bleeding site  Oral cavity: No masses or lesions  Pharynx no erythema exudate or mass.  No fresh or old blood.         Assessment and Plan   Diagnoses and all orders for this visit:    1. Nasal bleeding (Primary)    2. Antiplatelet or antithrombotic long-term use             Plan: Explained to the patient that there was not any specific intervention that I would recommend right now.  Bleeding after antiplatelet therapy is often not due to a large vessel and would not necessarily improve with cauterization.  In any case I cannot identify potential bleeding site that would be amenable to cauterization at this point.  Advised her to use saline nose spray 2 sprays each nostril 3 times a day for moisturization.  Told her she could discuss her anticoagulation further with Dr. Ramirez.  I told her she could be on what ever anticoagulants he felt were medically most appropriate although if she could avoid being on dual antiplatelet therapy that would probably make her risk of a recurrent bleed  lower.  I told her to call again if she is having recurrent problems.     05-25    137  |  107  |  11  ----------------------------<  148<H>  3.8   |  22  |  0.69    Ca    9.4      25 May 2023 07:30    TPro  7.7  /  Alb  3.2<L>  /  TBili  0.5  /  DBili  x   /  AST  26  /  ALT  41  /  AlkPhos  65  05-23  POCT Blood Glucose.: 148 mg/dL (05-25-23 @ 12:22)  A1C with Estimated Average Glucose Result: 8.3 % (05-24-23 @ 06:25)

## 2023-06-05 ENCOUNTER — TELEPHONE (OUTPATIENT)
Dept: CARDIOLOGY | Facility: CLINIC | Age: 73
End: 2023-06-05
Payer: MEDICARE

## 2023-06-05 NOTE — TELEPHONE ENCOUNTER
Alma Abernathy Donna L, MA  Phone Number: [phone]     She just saw Dr HANNAH Paredes and he said to get in with her cardiologist.  Fluid around her heart and left lobe.  She has an appt in August. Please advise    Ms. Shaffer was contacted and advised we have requested records from dr. Paredes. Once we have them we can work on getting her appt moved up.

## 2023-06-07 ENCOUNTER — TELEPHONE (OUTPATIENT)
Dept: CARDIOLOGY | Facility: CLINIC | Age: 73
End: 2023-06-07
Payer: MEDICARE

## 2023-06-08 ENCOUNTER — TELEPHONE (OUTPATIENT)
Dept: CARDIOLOGY | Facility: CLINIC | Age: 73
End: 2023-06-08
Payer: MEDICARE

## 2023-08-06 NOTE — PROGRESS NOTES
[facility] Cardiology  OFFICE NOTE    Cardiovascular Medicine  Bryant Ramirez M.D., [facility], FSCAI, RPVI         No referring provider defined for this encounter.    Thank you for asking me to see Bacilio Shaffer for shortness of breath.    History of Present Illness  This is a 72 y.o. female with:    1.PAH  A. PFTs 2020, restrictive  -FEV1/FVC 99% of predicted  -FVC 62% of predicted  -TLC 76% of predicted  -DLCO 45% of predicted  2. MR  3. TR  4.AI  5.  Risks: HTN, HLD, DM2 , Obese, Snores  6.  Coronary artery disease    Bacilio Shaffer is a 72 y.o. female who presents for consultation today.  Patient was previously established with Dr. Saravia, for moderate mitral regurgitation otherwise mild aortic and tricuspid regurgitation.  She was also found to have pulmonary pressures in the low 40s in the setting of moderate mitral regurgitation.    She initially preferred noninvasive management.  She did not originally want to see a sleep physician.  it was felt that her pulmonary pressures were likely from her worsening mitral regurgitation, so she was recommended to undergo ongoing surveillance.  She was agreeable to having PFTs performed.  These were performed in 2020 and were consistent with restrictive lung physiology.  Patient's FEV1/FVC was 99% of predicted.  The patient's FVC was low at 62% of predicted with TLC 76% of predicted.  The DLCO was also low at 45% of predicted.  The patient has not had any CT imaging.  She did have a chest x-ray which did not show any evidence of ILD.     Patient also had a stress echo in January 2020 which was an EF of 51 to 55% with no evidence of induced ischemia.  Heart rate was at 73% predicted though.    Patient went a right heart cath I mean PA pressures about 25, cardiac output and cardiac index normal, pulmonary capillary wedge pressure is 11.  UT was 3.8 Wood minutes.  She has not started on any specific therapies.    She has chronic shortness of breath  NYHA class II which is at baseline.  Denying orthopnea PND.    10/19/2022:  Patient underwent cardiac cath for an NSTEMI last month.  It showed plaque rupture and a small caliber OM for which she underwent successful PCI.  Has been on aspirin/Plavix and Eliquis.  Is recovering well.  Chest pain has resolved but continues to have shortness of breath.  No palpitations.    Review of Systems - ROS  Constitution: Negative for weakness, weight gain and weight loss.   HENT: Negative for congestion.    Eyes: Negative for blurred vision.   Cardiovascular: As mentioned above  Respiratory: Negative for cough and hemoptysis.    Endocrine: Negative for polydipsia and polyuria.   Hematologic/Lymphatic: Negative for bleeding problem. Does not bruise/bleed easily.   Skin: Negative for flushing.   Musculoskeletal: Negative for neck pain and stiffness.   Gastrointestinal: Negative for abdominal pain, diarrhea, jaundice, melena, nausea and vomiting.   Genitourinary: Negative for dysuria and hematuria.   Neurological: Negative for dizziness, focal weakness and numbness.   Psychiatric/Behavioral: Negative for altered mental status and depression.     I reviewed the ROS as documented here and confirmed the accuracy of it with the patient today. 10/19/2022        All other systems were reviewed and were negative.    family history includes Diabetes in an other family member; Heart disease in an other family member; Hypertension in an other family member; Stroke in an other family member.     reports that she has never smoked. She has never used smokeless tobacco. Drug use questions deferred to the physician. She reports that she does not drink alcohol.    Allergies   Allergen Reactions   • Atenolol Nausea And Vomiting   • Lortab [Hydrocodone-Acetaminophen] Mental Status Change         Current Outpatient Medications:   •  acetaminophen (TYLENOL) 650 MG 8 hr tablet, Take 650 mg by mouth Every 8 (Eight) Hours As Needed for Mild Pain ., Disp:  , Rfl:   •  amiodarone (PACERONE) 200 MG tablet, Take 1 tablet by mouth 2 (Two) Times a Day With Meals for 12 doses, then take 1 tablet by mouth Daily., Disp: 42 tablet, Rfl: 0  •  apixaban (ELIQUIS) 5 MG tablet tablet, Take 1 tablet by mouth Every 12 (Twelve) Hours., Disp: 60 tablet, Rfl: 0  •  aspirin 81 MG EC tablet, Take 81 mg by mouth Daily., Disp: , Rfl:   •  atorvastatin (LIPITOR) 40 MG tablet, Take 40 mg by mouth Every Evening., Disp: , Rfl:   •  Blood Glucose Monitoring Suppl (ONE TOUCH ULTRA 2) w/Device kit, Use as instructed to test blood sugar 3 (three) times a day before meals., Disp: 1 each, Rfl: 0  •  clopidogrel (PLAVIX) 75 MG tablet, Take 1 tablet by mouth Daily., Disp: 30 tablet, Rfl: 0  •  ferrous sulfate 325 (65 FE) MG tablet, Daily., Disp: , Rfl:   •  glucose blood (FREESTYLE LITE) test strip, Use as instructed to test blood sugar 3 (three) times a day before meals.  **One Touch**, Disp: 100 each, Rfl: 12  •  Lancets (OneTouch Delica Plus [id number]) misc, Use as instructed to test blood sugar 3 (three) times a day before meals., Disp: 100 each, Rfl: 12  •  magnesium oxide (MAGOX) 400 (241.3 Mg) MG tablet tablet, Take 1 tablet by mouth Daily., Disp: , Rfl:   •  metFORMIN (GLUCOPHAGE) 500 MG tablet, Take 500 mg by mouth 2 (Two) Times a Day With Meals., Disp: , Rfl:   •  metoprolol tartrate (LOPRESSOR) 25 MG tablet, Take one-half tablet (12.5 mg) by mouth Every 12 (Twelve) Hours., Disp: 30 tablet, Rfl: 0  •  Multiple Vitamins-Minerals (MULTIVITAMIN WITH MINERALS) tablet tablet, Take 1 tablet by mouth Daily., Disp: , Rfl:   •  pantoprazole (Protonix) 40 MG EC tablet, Take 1 tablet by mouth Daily., Disp: 90 tablet, Rfl: 1  •  vitamin B-12 (CYANOCOBALAMIN) 1000 MCG tablet, Take 1,000 mcg by mouth Daily., Disp: , Rfl:   •  VITAMIN D PO, Take 2,000 Units by mouth Daily., Disp: , Rfl:     Physical Exam:  Vitals:    10/19/22 1044   BP: 140/90   BP Location: Left arm   Patient Position: Sitting   Cuff  "Size: Adult   Pulse: 103   SpO2: 99%   Weight: 91.7 kg (202 lb 3.2 oz)   Height: 162.6 cm (64\")   PainSc: 0-No pain     Current Pain Level: none  Pulse Ox: Normal  on room air  General: alert, appears stated age and cooperative     Body Habitus: well-nourished    HEENT: Head: Normocephalic, no lesions, without obvious abnormality. No arcus senilis, xanthelasma or xanthomas.    Neuro: alert, oriented x3  Pulses: 2+ and symmetric  JVP: Volume/Pulsation: Normal.  Normal waveforms.   Appropriate inspiratory decrease.  No Kussmaul's. No Juma's.   Carotid Exam: no bruit normal pulsation bilaterally   Carotid Volume: normal.     Respirations: no increased work of breathing   Chest:  Normal    Pulmonary:Normal   Precordium: Normal impulses. P2 is not palpable.  RV Heave: absent  LV Heave: absent  Holbrook:  normal size and placement  Palpable S4: absent.  Heart rate: normal    Heart Rhythm: Irregular     Heart Sounds: S1: normal  S2: normal  S3: absent   S4: absent  Opening Snap: absent    Pericardial Rub:  Absent: .    Abdomen:   Appearance: normal .  Palpation: Soft, non-tender to palpation, bowel sounds positive in all four quadrants; no guarding or rebound tenderness  Extremity: no edema.   LE Skin: no rashes  LE Hair:  normal  LE Pulses: well perfused with normal pulses in the distal extremities  Pallor on elevation: Absent. Rubor on dependency: None    I have reexamined the patient and the results are consistent with the previously documented exam. Bryant Ramirez MD     DATA REVIEWED:     EKG. I personally reviewed and interpreted the EKG.  Normal sinus rhythm with sinus arrhythmia.    ECG/EMG Results (all)     None        ---------------------------------------------------  TTE/BIRDIE:  Results for orders placed during the hospital encounter of 09/26/22    Adult Transthoracic Echo Complete w/ Color, Spectral and Contrast if Necessary Per Protocol    Interpretation Summary  · Left ventricular ejection fraction appears to " be 51 - 55%. Left ventricular systolic function is low normal.  · Left ventricular wall thickness is consistent with moderate concentric hypertrophy.  · Estimated right ventricular systolic pressure from tricuspid regurgitation is normal (<35 mmHg).  · Left atrial volume is severely increased.  · Left ventricular diastolic function was indeterminate.            --------------------------------------------------------------------------------------------------  LABS:     The CVD Risk score (Sah, et al., 2008) failed to calculate for the following reasons:    The patient has a prior MI, stroke, CHF, or peripheral vascular disease diagnosis         Lab Results   Component Value Date    GLUCOSE 122 (H) 09/29/2022    BUN 18 09/29/2022    CREATININE 1.20 (H) 09/29/2022    EGFRIFAFRI 45 (L) 02/26/2021    BCR 15.0 09/29/2022    K 3.8 09/29/2022    CO2 23.0 09/29/2022    CALCIUM 9.6 09/29/2022    ALBUMIN 4.20 02/09/2021    AST 17 02/09/2021    ALT 16 02/09/2021     Lab Results   Component Value Date    WBC 8.38 09/29/2022    HGB 8.6 (L) 09/29/2022    HCT 26.6 (L) 09/29/2022    MCV 89.6 09/29/2022     09/29/2022     Lab Results   Component Value Date    CHOL 137 02/09/2021    CHLPL 197 12/15/2015    TRIG 78 02/09/2021    HDL 49 02/09/2021    LDL 73 02/09/2021     Lab Results   Component Value Date    TSH 2.080 03/05/2020     Lab Results   Component Value Date    TROPONINT 1.990 (C) 09/29/2022     Lab Results   Component Value Date    HGBA1C 6.0 (H) 12/15/2015     No results found for: DDIMER  Lab Results   Component Value Date    ALT 16 02/09/2021     Lab Results   Component Value Date    HGBA1C 6.0 (H) 12/15/2015    HGBA1C 6.3 (H) 08/03/2015    HGBA1C 6.4 (H) 03/17/2015     Lab Results   Component Value Date    MICROALBUR <1.2 01/10/2020    CREATININE 1.20 (H) 09/29/2022     No results found for: IRON, TIBC, FERRITIN  Lab Results   Component Value Date    INR 1.12 09/26/2022    PROTIME 14.4 09/26/2022        Assessment/Plan     1.  Coronary artery disease:  Status post PCI to small caliber OM for NSTEMI.  Continue aspirin until the 27th after which can be stopped and we will keep her on Plavix and Eliquis.  Continue metoprolol.  Echo which showed preserved LV systolic function with moderate LVH.    2.  Atrial fibrillation:  New onset, rate is well controlled.  [id number] score was 4.  Has been started on Eliquis for anticoagulation.  Continue amiodarone and metoprolol.  We will reassess her in 4 to 6 weeks and if continues to have symptoms we will plan on cardioversion.    1.  Shortness of breath: Stable  Multifactorial from obesity, restrictive disease on PFTs and borderline elevated pulmonary pressures in setting of moderate mitral regurgitation.  Also has significant diastolic dysfunction and aortic regurgitation  Optimal blood pressure control.    2.  Moderate mitral regurgitation/mild aortic regurgitation:  Optimal blood pressure control.  Continue to monitor.  Repeat echocardiogram in April 2021 with mild MR.  AI was mild    3.  Hypertension:  When she was in the hospital blood pressure was borderline.  Carvedilol was switched to metoprolol.  We will restart amlodipine 5 mg.    Varicose veins, she did not get her venous ultrasound last visit.      Prevention:  Patient's Body mass index is 34.71 kg/m². indicating that she is obese (BMI >30). Obesity-related health conditions include the following: hypertension and coronary heart disease. Obesity is newly identified. BMI is is above average; BMI management plan is completed. We discussed portion control and increasing exercise..      Bacilio Shaffer  reports that she has never smoked. She has never used smokeless tobacco..        This document has been electronically signed by Bryant Ramirez MD on October 19, 2022 11:15 CDT               show

## 2023-09-12 RX ORDER — CLOPIDOGREL BISULFATE 75 MG/1
75 TABLET ORAL DAILY
Qty: 90 TABLET | Refills: 1 | Status: SHIPPED | OUTPATIENT
Start: 2023-09-12 | End: 2023-09-17 | Stop reason: SDUPTHER

## 2023-09-14 ENCOUNTER — TELEPHONE (OUTPATIENT)
Dept: CARDIOLOGY | Facility: CLINIC | Age: 73
End: 2023-09-14
Payer: MEDICARE

## 2023-09-14 NOTE — TELEPHONE ENCOUNTER
Theodora prado APRN Oldham, Donna L, MA  Stop lisinopril for now.          Previous Messages       ----- Message -----  From: Mary Kate Penn MA  Sent: 9/14/2023   1:31 PM CDT  To: BRIANNA Roberson  Subject: FW: bp issues                                    Would you help me with this? Her daughter states her bp this morning at dr. Martinez office was 107/71.  She states the patient has been very fatigued with it being this low.  Lena said to call our office. She sees dr. Ramirez oct 4  ----- Message -----  From: Yuni Gamboa  Sent: 9/14/2023  12:00 PM CDT  To: Mary Kate Penn MA  Subject: bp issues                                        Pt daughter, Edie Liu, called and said pt saw her PCP today and he wanted her to reach out to her cardiologist in regards to her blood pressure being too low.    Pt's daughter wanted a call back to discuss.    [phone]    Thank you.     Per theodora rodriguez they were instructed to stop lisinopril for now.  They will contact the office in a few days if her bp remains low.

## 2023-09-18 RX ORDER — CLOPIDOGREL BISULFATE 75 MG/1
75 TABLET ORAL DAILY
Qty: 90 TABLET | Refills: 3 | Status: SHIPPED | OUTPATIENT
Start: 2023-09-18

## 2023-09-19 RX ORDER — FUROSEMIDE 40 MG/1
40 TABLET ORAL DAILY
Qty: 90 TABLET | Refills: 1 | Status: SHIPPED | OUTPATIENT
Start: 2023-09-19

## 2023-09-25 ENCOUNTER — APPOINTMENT (OUTPATIENT)
Dept: GENERAL RADIOLOGY | Facility: HOSPITAL | Age: 73
End: 2023-09-25
Payer: MEDICARE

## 2023-09-25 ENCOUNTER — HOSPITAL ENCOUNTER (EMERGENCY)
Facility: HOSPITAL | Age: 73
Discharge: HOME | End: 2023-09-25
Attending: STUDENT IN AN ORGANIZED HEALTH CARE EDUCATION/TRAINING PROGRAM | Admitting: STUDENT IN AN ORGANIZED HEALTH CARE EDUCATION/TRAINING PROGRAM
Payer: MEDICARE

## 2023-09-25 ENCOUNTER — APPOINTMENT (OUTPATIENT)
Dept: ULTRASOUND IMAGING | Facility: HOSPITAL | Age: 73
End: 2023-09-25
Payer: MEDICARE

## 2023-09-25 ENCOUNTER — TELEPHONE (OUTPATIENT)
Dept: CARDIOLOGY | Facility: CLINIC | Age: 73
End: 2023-09-25

## 2023-09-25 VITALS
WEIGHT: 183 LBS | SYSTOLIC BLOOD PRESSURE: 139 MMHG | DIASTOLIC BLOOD PRESSURE: 86 MMHG | HEART RATE: 78 BPM | RESPIRATION RATE: 18 BRPM | BODY MASS INDEX: 31.24 KG/M2 | TEMPERATURE: 97.9 F | HEIGHT: 64 IN | OXYGEN SATURATION: 95 %

## 2023-09-25 DIAGNOSIS — R06.02 SOB (SHORTNESS OF BREATH): Primary | ICD-10-CM

## 2023-09-25 DIAGNOSIS — M79.89 LEG SWELLING: ICD-10-CM

## 2023-09-25 LAB
ALBUMIN SERPL-MCNC: 4 G/DL (ref 3.5–5.2)
ALBUMIN/GLOB SERPL: 1.1 G/DL
ALP SERPL-CCNC: 86 U/L (ref 39–117)
ALT SERPL W P-5'-P-CCNC: 27 U/L (ref 1–33)
ANION GAP SERPL CALCULATED.3IONS-SCNC: 13 MMOL/L (ref 5–15)
AST SERPL-CCNC: 32 U/L (ref 1–32)
BASOPHILS # BLD AUTO: 0.03 10*3/MM3 (ref 0–0.2)
BASOPHILS NFR BLD AUTO: 0.4 % (ref 0–1.5)
BILIRUB SERPL-MCNC: 0.7 MG/DL (ref 0–1.2)
BUN SERPL-MCNC: 25 MG/DL (ref 8–23)
BUN/CREAT SERPL: 15.8 (ref 7–25)
CALCIUM SPEC-SCNC: 9.9 MG/DL (ref 8.6–10.5)
CHLORIDE SERPL-SCNC: 96 MMOL/L (ref 98–107)
CO2 SERPL-SCNC: 23 MMOL/L (ref 22–29)
CREAT SERPL-MCNC: 1.58 MG/DL (ref 0.57–1)
DEPRECATED RDW RBC AUTO: 49.1 FL (ref 37–54)
EGFRCR SERPLBLD CKD-EPI 2021: 34.2 ML/MIN/1.73
EOSINOPHIL # BLD AUTO: 0.1 10*3/MM3 (ref 0–0.4)
EOSINOPHIL NFR BLD AUTO: 1.2 % (ref 0.3–6.2)
ERYTHROCYTE [DISTWIDTH] IN BLOOD BY AUTOMATED COUNT: 14 % (ref 12.3–15.4)
GEN 5 2HR TROPONIN T REFLEX: 103 NG/L
GLOBULIN UR ELPH-MCNC: 3.5 GM/DL
GLUCOSE SERPL-MCNC: 111 MG/DL (ref 65–99)
HCT VFR BLD AUTO: 35.6 % (ref 34–46.6)
HGB BLD-MCNC: 11.5 G/DL (ref 12–15.9)
HOLD SPECIMEN: NORMAL
IMM GRANULOCYTES # BLD AUTO: 0.04 10*3/MM3 (ref 0–0.05)
IMM GRANULOCYTES NFR BLD AUTO: 0.5 % (ref 0–0.5)
LYMPHOCYTES # BLD AUTO: 1.68 10*3/MM3 (ref 0.7–3.1)
LYMPHOCYTES NFR BLD AUTO: 20.4 % (ref 19.6–45.3)
MCH RBC QN AUTO: 31.6 PG (ref 26.6–33)
MCHC RBC AUTO-ENTMCNC: 32.3 G/DL (ref 31.5–35.7)
MCV RBC AUTO: 97.8 FL (ref 79–97)
MONOCYTES # BLD AUTO: 0.64 10*3/MM3 (ref 0.1–0.9)
MONOCYTES NFR BLD AUTO: 7.8 % (ref 5–12)
NEUTROPHILS NFR BLD AUTO: 5.74 10*3/MM3 (ref 1.7–7)
NEUTROPHILS NFR BLD AUTO: 69.7 % (ref 42.7–76)
NRBC BLD AUTO-RTO: 0 /100 WBC (ref 0–0.2)
NT-PROBNP SERPL-MCNC: 5001 PG/ML (ref 0–900)
PLATELET # BLD AUTO: 312 10*3/MM3 (ref 140–450)
PMV BLD AUTO: 10.5 FL (ref 6–12)
POTASSIUM SERPL-SCNC: 3.9 MMOL/L (ref 3.5–5.2)
PROT SERPL-MCNC: 7.5 G/DL (ref 6–8.5)
RBC # BLD AUTO: 3.64 10*6/MM3 (ref 3.77–5.28)
SODIUM SERPL-SCNC: 132 MMOL/L (ref 136–145)
TROPONIN T DELTA: -2 NG/L
TROPONIN T SERPL HS-MCNC: 105 NG/L
WBC NRBC COR # BLD: 8.23 10*3/MM3 (ref 3.4–10.8)
WHOLE BLOOD HOLD COAG: NORMAL
WHOLE BLOOD HOLD SPECIMEN: NORMAL

## 2023-09-25 PROCEDURE — 84484 ASSAY OF TROPONIN QUANT: CPT | Performed by: STUDENT IN AN ORGANIZED HEALTH CARE EDUCATION/TRAINING PROGRAM

## 2023-09-25 PROCEDURE — 96374 THER/PROPH/DIAG INJ IV PUSH: CPT

## 2023-09-25 PROCEDURE — 71046 X-RAY EXAM CHEST 2 VIEWS: CPT

## 2023-09-25 PROCEDURE — 83880 ASSAY OF NATRIURETIC PEPTIDE: CPT | Performed by: STUDENT IN AN ORGANIZED HEALTH CARE EDUCATION/TRAINING PROGRAM

## 2023-09-25 PROCEDURE — 99284 EMERGENCY DEPT VISIT MOD MDM: CPT

## 2023-09-25 PROCEDURE — 85025 COMPLETE CBC W/AUTO DIFF WBC: CPT

## 2023-09-25 PROCEDURE — 25010000002 FUROSEMIDE PER 20 MG: Performed by: STUDENT IN AN ORGANIZED HEALTH CARE EDUCATION/TRAINING PROGRAM

## 2023-09-25 PROCEDURE — 80053 COMPREHEN METABOLIC PANEL: CPT

## 2023-09-25 PROCEDURE — 93971 EXTREMITY STUDY: CPT

## 2023-09-25 RX ORDER — SODIUM CHLORIDE 0.9 % (FLUSH) 0.9 %
10 SYRINGE (ML) INJECTION AS NEEDED
Status: DISCONTINUED | OUTPATIENT
Start: 2023-09-25 | End: 2023-09-25 | Stop reason: HOSPADM

## 2023-09-25 RX ORDER — FUROSEMIDE 10 MG/ML
80 INJECTION INTRAMUSCULAR; INTRAVENOUS ONCE
Status: COMPLETED | OUTPATIENT
Start: 2023-09-25 | End: 2023-09-25

## 2023-09-25 RX ADMIN — FUROSEMIDE 80 MG: 10 INJECTION, SOLUTION INTRAMUSCULAR; INTRAVENOUS at 15:15

## 2023-09-25 NOTE — ED PROVIDER NOTES
Subjective   History of Present Illness  73-year-old female comes to the ER chief complaint of swelling over her body.  Is worse in her left leg.  Left leg is also tender.  Getting worse for the last 2 weeks.  She is unable to lay flat due to feeling like she is smothering.  She takes Lasix at home for her heart failure.  Denies other symptoms.    History provided by:  Patient   used: No      Review of Systems   Constitutional:  Positive for activity change. Negative for chills, fatigue and fever.   HENT:  Negative for drooling.    Eyes:  Negative for redness.   Respiratory:  Negative for cough, choking, chest tightness, shortness of breath and wheezing.    Cardiovascular:  Positive for leg swelling. Negative for chest pain.   Gastrointestinal:  Negative for abdominal pain, nausea and vomiting.   Genitourinary:  Negative for flank pain.   Musculoskeletal:  Positive for myalgias.   Skin:  Negative for color change.   Neurological:  Negative for seizures, weakness, light-headedness and headaches.   Psychiatric/Behavioral:  Negative for confusion.      Past Medical History:   Diagnosis Date    Carpal tunnel syndrome     Coronary artery disease     1 stent.   is followed by     Diabetes mellitus     Diabetic neuropathy     Female stress incontinence     GERD (gastroesophageal reflux disease)     History of colonoscopy 2007    History of mammogram 05/2011    Hyperlipidemia     Hypertension     Murmur, cardiac     Nosebleed O4/29/2023    First    Sciatica of left side     Sinusitis     Stage 3 chronic kidney disease     Vitamin D deficiency        Allergies   Allergen Reactions    Atenolol Nausea And Vomiting    Lortab [Hydrocodone-Acetaminophen] Mental Status Change       Past Surgical History:   Procedure Laterality Date    CARDIAC CATHETERIZATION N/A 02/23/2021    Procedure: Right Heart Cath;  Surgeon: Maximiliano Saravia MD PhD;  Location: [facility] INVASIVE LOCATION;  Service:  "Cardiology;  Laterality: N/A;    CARDIAC CATHETERIZATION N/A 09/27/2022    Procedure: Left Heart Cath;  Surgeon: Bryant Ramirez MD;  Location: [facility] CATH INVASIVE LOCATION;  Service: Cardiology;  Laterality: N/A;    CARPAL TUNNEL RELEASE Bilateral     HYSTERECTOMY         Family History   Problem Relation Age of Onset    Diabetes Mother     Heart disease Mother     Diabetes Father     Cancer Father     Diabetes Other     Heart disease Other     Hypertension Other     Stroke Other        Social History     Socioeconomic History    Marital status: Single   Tobacco Use    Smoking status: Never    Smokeless tobacco: Never   Vaping Use    Vaping Use: Never used   Substance and Sexual Activity    Alcohol use: No    Drug use: Never    Sexual activity: Defer           Objective   Vitals:    09/25/23 1031 09/25/23 1054 09/25/23 1204 09/25/23 1300   BP: 125/84  118/79 139/86   BP Location: Right arm      Patient Position: Sitting      Pulse: 81  73 78   Resp: 16  18    Temp: 97.9 °F (36.6 °C)      TempSrc: Oral      SpO2: 98%  93% 95%   Weight:  83 kg (183 lb)     Height:  162.6 cm (64\")         Physical Exam  Vitals and nursing note reviewed.   Constitutional:       General: She is not in acute distress.     Appearance: She is well-developed. She is not ill-appearing, toxic-appearing or diaphoretic.   Eyes:      Conjunctiva/sclera: Conjunctivae normal.   Pulmonary:      Effort: Pulmonary effort is normal. No accessory muscle usage or respiratory distress.   Chest:      Chest wall: No tenderness.   Abdominal:      Palpations: Abdomen is soft.      Tenderness: There is no abdominal tenderness (deep palpation). There is no right CVA tenderness, left CVA tenderness, guarding or rebound.   Musculoskeletal:         General: Tenderness (left calf) present.      Right lower leg: Edema present.      Left lower leg: Edema present.   Skin:     General: Skin is warm and dry.      Capillary Refill: Capillary refill takes less than 2 " seconds.   Neurological:      Mental Status: She is alert and oriented to person, place, and time.       Procedures           ED Course      Results for orders placed or performed during the hospital encounter of 09/25/23   Comprehensive Metabolic Panel    Specimen: Blood   Result Value Ref Range    Glucose 111 (H) 65 - 99 mg/dL    BUN 25 (H) 8 - 23 mg/dL    Creatinine 1.58 (H) 0.57 - 1.00 mg/dL    Sodium 132 (L) 136 - 145 mmol/L    Potassium 3.9 3.5 - 5.2 mmol/L    Chloride 96 (L) 98 - 107 mmol/L    CO2 23.0 22.0 - 29.0 mmol/L    Calcium 9.9 8.6 - 10.5 mg/dL    Total Protein 7.5 6.0 - 8.5 g/dL    Albumin 4.0 3.5 - 5.2 g/dL    ALT (SGPT) 27 1 - 33 U/L    AST (SGOT) 32 1 - 32 U/L    Alkaline Phosphatase 86 39 - 117 U/L    Total Bilirubin 0.7 0.0 - 1.2 mg/dL    Globulin 3.5 gm/dL    A/G Ratio 1.1 g/dL    BUN/Creatinine Ratio 15.8 7.0 - 25.0    Anion Gap 13.0 5.0 - 15.0 mmol/L    eGFR 34.2 (L) >60.0 mL/min/1.73   CBC Auto Differential    Specimen: Blood   Result Value Ref Range    WBC 8.23 3.40 - 10.80 10*3/mm3    RBC 3.64 (L) 3.77 - 5.28 10*6/mm3    Hemoglobin 11.5 (L) 12.0 - 15.9 g/dL    Hematocrit 35.6 34.0 - 46.6 %    MCV 97.8 (H) 79.0 - 97.0 fL    MCH 31.6 26.6 - 33.0 pg    MCHC 32.3 31.5 - 35.7 g/dL    RDW 14.0 12.3 - 15.4 %    RDW-SD 49.1 37.0 - 54.0 fl    MPV 10.5 6.0 - 12.0 fL    Platelets 312 140 - 450 10*3/mm3    Neutrophil % 69.7 42.7 - 76.0 %    Lymphocyte % 20.4 19.6 - 45.3 %    Monocyte % 7.8 5.0 - 12.0 %    Eosinophil % 1.2 0.3 - 6.2 %    Basophil % 0.4 0.0 - 1.5 %    Immature Grans % 0.5 0.0 - 0.5 %    Neutrophils, Absolute 5.74 1.70 - 7.00 10*3/mm3    Lymphocytes, Absolute 1.68 0.70 - 3.10 10*3/mm3    Monocytes, Absolute 0.64 0.10 - 0.90 10*3/mm3    Eosinophils, Absolute 0.10 0.00 - 0.40 10*3/mm3    Basophils, Absolute 0.03 0.00 - 0.20 10*3/mm3    Immature Grans, Absolute 0.04 0.00 - 0.05 10*3/mm3    nRBC 0.0 0.0 - 0.2 /100 WBC   BNP    Specimen: Blood   Result Value Ref Range    proBNP 5,001.0 (H)  0.0 - 900.0 pg/mL   Single High Sensitivity Troponin T    Specimen: Blood   Result Value Ref Range    HS Troponin T 105 (C) <10 ng/L   High Sensitivity Troponin T 2Hr    Specimen: Blood   Result Value Ref Range    HS Troponin T 103 (C) <10 ng/L    Troponin T Delta -2 >=-4 - <+4 ng/L   Green Top (Gel)   Result Value Ref Range    Extra Tube Hold for add-ons.    Lavender Top   Result Value Ref Range    Extra Tube hold for add-on    Gold Top - SST   Result Value Ref Range    Extra Tube Hold for add-ons.    Light Blue Top   Result Value Ref Range    Extra Tube Hold for add-ons.    Gray Top   Result Value Ref Range    Extra Tube Hold for add-ons.      US Venous Doppler Lower Extremity Left (duplex)   Preliminary Result      1.  Normal left lower extremity venous duplex ultrasound.  No evidence of DVT.      2.  Incidental note is made of a cyst in the popliteal fossa measuring up to 5.7   cm, statistically representing a Baker's cyst.         XR Chest 2 View   Final Result                                             Medical Decision Making  Vital signs are stable, afebrile.  Labs obtained and are remarkable for BNP of 5000 and which is slightly above her baseline.  Troponin is 105 with a repeat trending downward to 103.  Patient's not having any chest pain.  Ultrasound negative for DVT.  Chest x-ray shows a right-sided pleural effusion.  She satting well on room air in no distress.  IV Lasix given.  Discussed with the on-call hospitalist who is in agreement that the patient could go home with outpatient follow-up.  Recommend she follow-up with her PCP.  Return precautions given.  Patient and family state understanding and are agreeable to the plan.    Problems Addressed:  Leg swelling: complicated acute illness or injury  SOB (shortness of breath): complicated acute illness or injury    Amount and/or Complexity of Data Reviewed  Independent Historian: friend  Labs: ordered.  Radiology: ordered.    Risk  Prescription drug  management.        Final diagnoses:   SOB (shortness of breath)   Leg swelling       ED Disposition  ED Disposition       ED Disposition   Discharge    Condition   Stable    Comment   --               Oscar Paredes MD  [address] [facility] [address] [phone]    Schedule an appointment as soon as possible for a visit in 2 days  As needed, ER follow up         Medication List      No changes were made to your prescriptions during this visit.            Jorden Velez MD  09/25/23 [id number]

## 2023-09-25 NOTE — TELEPHONE ENCOUNTER
From: Yuni Gamboa   Sent: 9/21/2023  11:39 AM CDT   To: Mary Kate Penn MA   Subject: BP READINGS                                      Pt called and said she was supposed to call in 5 home BP readings:     116/77 9/16   128/70 9/17   132/88 9/18   129/88 9/19   138/80 9/20     [phone] is the call back for Bryant Davies MD Oldham, Donna L, MA  Looks good.    Per dr. Ramirez bp readings look good.

## (undated) DEVICE — BALN PTCA TAKERU RX 2.25X12MM

## (undated) DEVICE — ELECTRODE,RT,STRESS,FOAM,50PK: Brand: MEDLINE

## (undated) DEVICE — PK CATH LAB 60

## (undated) DEVICE — RADIFOCUS OPTITORQUE ANGIOGRAPHIC CATHETER: Brand: OPTITORQUE

## (undated) DEVICE — GLIDESHEATH BASIC HYDROPHILIC COATED INTRODUCER SHEATH: Brand: GLIDESHEATH

## (undated) DEVICE — TR BAND RADIAL ARTERY COMPRESSION DEVICE: Brand: TR BAND

## (undated) DEVICE — GLIDESHEATH SLENDER STAINLESS STEEL KIT: Brand: GLIDESHEATH SLENDER

## (undated) DEVICE — ST CVR PROB PULLUP ULTRASND 5X48IN

## (undated) DEVICE — X-DRAPE ABS 12"X17" .25MM LEAD EQUIV STERILE X-RAY SHIELD 10/BOX: Brand: X-DRAPE

## (undated) DEVICE — CATH THERMODIL SWANGANZ 4LUM 6F 110CM

## (undated) DEVICE — CATH GUIDE LAUNCHER EBU3.5 6F 100CM

## (undated) DEVICE — MODEL BT2000 P/N 700287-012KIT CONTENTS: MANIFOLD WITH SALINE AND CONTRAST PORTS, SALINE TUBING WITH SPIKE AND HAND SYRINGE, TRANSDUCER: Brand: BT2000 AUTOMATED MANIFOLD KIT

## (undated) DEVICE — GW PERIPH GUIDERIGHT STD/EXCHNG/J/TIP SS 0.038IN 5X260CM

## (undated) DEVICE — KT INTRO MINISTICK MAX W/GW NITNL/TUNG ECHO 4F 21G 7CM

## (undated) DEVICE — MINI TREK CORONARY DILATATION CATHETER 2.0 MM X 15 MM / RAPID-EXCHANGE: Brand: MINI TREK

## (undated) DEVICE — COPILOT KIT INCLUDES BLEEDBACK CONTROL VALVE / GUIDE WIRE INTRODUCER / TORQUE DEVICE: Brand: ACCESSORIES

## (undated) DEVICE — GW TUNGSTEN NITINL .018 45CM

## (undated) DEVICE — DEV INFL MONARCH 20ML

## (undated) DEVICE — A2000 MULTI-USE SYRINGE KIT, P/N 701277-003KIT CONTENTS: 100ML CONTRAST RESERVOIR AND TUBING WITH CONTRAST SPIKE AND CLAMP: Brand: A2000 MULTI-USE SYRINGE KIT

## (undated) DEVICE — RUNTHROUGH NS EXTRA FLOPPY PTCA GUIDEWIRE: Brand: RUNTHROUGH